# Patient Record
Sex: MALE | Race: WHITE | HISPANIC OR LATINO | Employment: OTHER | URBAN - METROPOLITAN AREA
[De-identification: names, ages, dates, MRNs, and addresses within clinical notes are randomized per-mention and may not be internally consistent; named-entity substitution may affect disease eponyms.]

---

## 2017-02-06 ENCOUNTER — HOSPITAL ENCOUNTER (OUTPATIENT)
Dept: RADIOLOGY | Facility: HOSPITAL | Age: 62
Discharge: HOME | End: 2017-02-06
Attending: PODIATRIST
Payer: COMMERCIAL

## 2017-02-06 ENCOUNTER — OFFICE VISIT (OUTPATIENT)
Dept: PODIATRY | Facility: CLINIC | Age: 62
End: 2017-02-06
Payer: COMMERCIAL

## 2017-02-06 VITALS — HEIGHT: 67 IN | BODY MASS INDEX: 26.68 KG/M2 | WEIGHT: 170 LBS

## 2017-02-06 DIAGNOSIS — M72.2 PLANTAR FASCIITIS, RIGHT: ICD-10-CM

## 2017-02-06 DIAGNOSIS — M72.2 PLANTAR FASCIITIS, RIGHT: Primary | ICD-10-CM

## 2017-02-06 DIAGNOSIS — M79.671 PAIN OF RIGHT HEEL: ICD-10-CM

## 2017-02-06 PROCEDURE — 73650 X-RAY EXAM OF HEEL: CPT | Mod: 26,,, | Performed by: RADIOLOGY

## 2017-02-06 PROCEDURE — 20550 NJX 1 TENDON SHEATH/LIGAMENT: CPT | Mod: S$GLB,,, | Performed by: PODIATRIST

## 2017-02-06 PROCEDURE — 99999 PR PBB SHADOW E&M-EST. PATIENT-LVL III: CPT | Mod: PBBFAC,,, | Performed by: PODIATRIST

## 2017-02-06 PROCEDURE — 73650 X-RAY EXAM OF HEEL: CPT | Mod: TC,PO,RT

## 2017-02-06 PROCEDURE — 99203 OFFICE O/P NEW LOW 30 MIN: CPT | Mod: 25,S$GLB,, | Performed by: PODIATRIST

## 2017-02-06 RX ORDER — LIDOCAINE HYDROCHLORIDE 20 MG/ML
1 INJECTION, SOLUTION EPIDURAL; INFILTRATION; INTRACAUDAL; PERINEURAL ONCE
Status: DISCONTINUED | OUTPATIENT
Start: 2017-02-06 | End: 2017-02-06

## 2017-02-06 RX ORDER — NAPROXEN 500 MG/1
500 TABLET ORAL 2 TIMES DAILY WITH MEALS
Qty: 30 TABLET | Refills: 0 | Status: SHIPPED | OUTPATIENT
Start: 2017-02-06 | End: 2017-11-16

## 2017-02-06 RX ORDER — METHYLPREDNISOLONE ACETATE 40 MG/ML
40 INJECTION, SUSPENSION INTRA-ARTICULAR; INTRALESIONAL; INTRAMUSCULAR; SOFT TISSUE ONCE
Status: COMPLETED | OUTPATIENT
Start: 2017-02-06 | End: 2017-02-06

## 2017-02-06 RX ADMIN — METHYLPREDNISOLONE ACETATE 40 MG: 40 INJECTION, SUSPENSION INTRA-ARTICULAR; INTRALESIONAL; INTRAMUSCULAR; SOFT TISSUE at 10:02

## 2017-02-06 NOTE — MR AVS SNAPSHOT
[facility]   [place]  [place] [address]  Phone: [phone]                  Alessandro Cerna   2017 9:30 AM   Office Visit    Description:  Male : 1955   Provider:  Pop Moe DPM   Department:  [facility]           Reason for Visit     Foot Pain           Diagnoses this Visit        Comments    Plantar fasciitis, right    -  Primary     Pain of right heel                To Do List           Future Appointments        Provider Department Dept Phone    2017 11:00 AM KENH XR1 300 LB LIMIT [facility] [phone]    2017 9:30 AM Pop Moe DPM [facility] [phone]      Goals (5 Years of Data)     None      Follow-Up and Disposition     Return in about 2 weeks (around 2017).       These Medications        Disp Refills Start End    naproxen (NAPROSYN) 500 MG tablet 30 tablet 0 2017     Take 1 tablet (500 mg total) by mouth 2 (two) times daily with meals. - Oral    Pharmacy: [facility]/pharmacy #[id number] - [place] - [address] #: [phone]         [facility]     [facility] Nurse Care Line -  Assistance  Registered nurses in the [facility] provide clinical advisement, health education, appointment booking, and other advisory services.  Call for this free service at [phone].             Medications           Message regarding Medications     Verify the changes and/or additions to your medication regime listed below are the same as discussed with your clinician today.  If any of these changes or additions are incorrect, please notify your healthcare provider.        START taking these NEW medications        Refills    naproxen (NAPROSYN) 500 MG tablet 0    Sig: Take 1 tablet (500 mg total) by mouth 2 (two) times daily with meals.    Class: Normal    Route: Oral      These medications were administered today        Dose Freq    methylPREDNISolone acetate  "injection 40 mg 40 mg Once    Sig: Inject 1 mL (40 mg total) into the muscle once.    Class: Normal    Route: Intramuscular      STOP taking these medications     SUPREP BOWEL PREP KIT 17.5-3.13-1.6 gram SolR     esomeprazole (NEXIUM) 40 MG capsule Take 40 mg by mouth before breakfast.      cetirizine (ZYRTEC) 10 MG tablet Take 10 mg by mouth daily as needed.      benzonatate (TESSALON) 200 MG capsule            Verify that the below list of medications is an accurate representation of the medications you are currently taking.  If none reported, the list may be blank. If incorrect, please contact your healthcare provider. Carry this list with you in case of emergency.           Current Medications     naproxen (NAPROSYN) 500 MG tablet Take 1 tablet (500 mg total) by mouth 2 (two) times daily with meals.           Clinical Reference Information           Your Vitals Were     Height Weight BMI          5' 7" (1.702 m) 77.1 kg (170 lb) 26.63 kg/m2        Allergies as of 2/6/2017     No Known Allergies      Immunizations Administered on Date of Encounter - 2/6/2017     None      Orders Placed During Today's Visit     Future Labs/Procedures Expected by Expires    X-Ray Calcaneus 2 View Right  2/6/2017 2/6/2018      Administrations This Visit     methylPREDNISolone acetate injection 40 mg     Admin Date Action Dose Route Administered By             02/06/2017 Given 40 mg Intramuscular Pop Moe DPM                      [email or web address] Sign-Up     Activating your [facility] account is as easy as 1-2-3!     1) Visit [email or web address], select Sign Up Now, enter this activation code and your date of birth, then select Next.  [email or web address]  Expires: 3/23/2017 10:50 AM      2) Create a username and password to use when you visit [facility] in the future and select a security question in case you lose your password and select Next.    3) Enter your e-mail address and click Sign Up!    Additional Information  If you have " questions, please e-mail [email or web address] or call [phone] to talk to our [facility] staff. Remember, [facility] is NOT to be used for urgent needs. For medical emergencies, dial 911.         Instructions    Recommended Sof Sol Fit Series Arch Supports with neutral arch found on amazon.com or [employer] at [employer].      Bent-Knee Calf Stretch  This exercise is designed to stretch and strengthen your feet and ankles. Before beginning the exercise, read through all the instructions. While exercising, breathe normally and dont bounce. If you feel any pain, stop the exercise. If pain persists, inform your healthcare provider:    · Stand an arms length away from a wall. Place the palms of your hands on the wall. Step forward about 12 inches with your ______ foot.  · Keeping toes pointed forward and both heels on the floor, bend both knees and lean forward. Hold for ____30__ seconds. Relax.  · Repeat ___3___ times. Do ___2-3___ sets a day.  Date Last Reviewed: 8/16/2015  © 3931-5560 [employer]. [address] [place] [address], [place] [address]. All rights reserved. This information is not intended as a substitute for professional medical care. Always follow your healthcare professional's instructions.             Language Assistance Services     ATTENTION: Language assistance services are available, free of charge. Please call [phone].      ATENCIÓN: Si habla español, tiene a jones disposición servicios gratuitos de asistencia lingüística. Llame al [phone].     CHÚ Ý: N?u b?n nói Ti?ng Vi?t, có các d?ch v? h? tr? ngôn ng? mi?n phí dành cho b?n. G?i s? [phone].         [facility] complies with applicable Federal civil rights laws and does not discriminate on the basis of race, color, national origin, age, disability, or sex.

## 2017-02-06 NOTE — PATIENT INSTRUCTIONS
Recommended Sof Sol Fit Series Arch Supports with neutral arch found on amazon.com or Call [employer] ([employer]) at [facility].      Bent-Knee Calf Stretch  This exercise is designed to stretch and strengthen your feet and ankles. Before beginning the exercise, read through all the instructions. While exercising, breathe normally and dont bounce. If you feel any pain, stop the exercise. If pain persists, inform your healthcare provider:    · Stand an arms length away from a wall. Place the palms of your hands on the wall. Step forward about 12 inches with your ______ foot.  · Keeping toes pointed forward and both heels on the floor, bend both knees and lean forward. Hold for ____30__ seconds. Relax.  · Repeat ___3___ times. Do ___2-3___ sets a day.  Date Last Reviewed: 8/16/2015  © 0252-8779 [employer]. [address] [place] [address], [place] [address]. All rights reserved. This information is not intended as a substitute for professional medical care. Always follow your healthcare professional's instructions.

## 2017-02-07 NOTE — PROGRESS NOTES
"Subjective:      Patient ID: Alessandro Cerna is a 61 y.o. male.    Chief Complaint: Foot Pain (Right Foot)    Alessandro is a 61 y.o. male who presents to the clinic complaining of heel pain in the right foot, especially with the first step in the morning. The pain is described as Aching. The onset of the pain was gradual in the past month and has slightly improved over the past several days. Alessandro rates the pain as 5/10. He denies a history of trauma. Pt. 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     Vitals:    02/06/17 0951   Weight: 77.1 kg (170 lb)   Height: 5' 7" (1.702 m)   PainSc:   8   PainLoc: Foot      Past Medical History   Diagnosis Date    GERD (gastroesophageal reflux disease)     TB (pulmonary tuberculosis) 1982     Treated [facility] 9 mo therapy.       No past surgical history on file.    Family History   Problem Relation Age of Onset    Emphysema Neg Hx        Social History     Social History    Marital status:      Spouse name: N/A    Number of children: N/A    Years of education: N/A     Social History Main Topics    Smoking status: Former Smoker     Packs/day: 1.50     Years: 2.00     Types: Cigarettes     Quit date: 4/3/1977    Smokeless tobacco: Never Used    Alcohol use 1.0 oz/week    Drug use: None    Sexual activity: Not Asked     Other Topics Concern    None     Social History Narrative       Current Outpatient Prescriptions   Medication Sig Dispense Refill    naproxen (NAPROSYN) 500 MG tablet Take 1 tablet (500 mg total) by mouth 2 (two) times daily with meals. 30 tablet 0     No current facility-administered medications for this visit.        Review of patient's allergies indicates:  No Known Allergies      Review of Systems   Constitution: Negative for chills, decreased appetite and fever.   Cardiovascular: Negative for chest pain, claudication and leg swelling.   Respiratory: Negative for cough.    Skin: Negative for dry skin " and nail changes.   Musculoskeletal: Positive for joint pain and myalgias. Negative for joint swelling.   Gastrointestinal: Negative for nausea and vomiting.   Neurological: Negative for numbness and paresthesias.           Objective:      Physical Exam   Constitutional: He appears well-developed.   Cardiovascular:   Dorsalis pedis and posterior tibial pulses are 2/4 Bilaterally. Toes are cool to touch. Feet are warm proximally   Pulmonary/Chest: No respiratory distress.   Musculoskeletal: He exhibits no edema or tenderness.   Adequate joint range of motion without pain, limitation, nor crepitation Bilateral feet and ankle joints.     Pain on palpation plantar medial right heel, no pain with ROM or MMT or medial and lateral compression of heel, - tinel's sign    + equinus that reduces with knee bent bilateral.      Feet:   Right Foot:   Skin Integrity: Positive for callus and dry skin. Negative for ulcer or skin breakdown.   Left Foot:   Skin Integrity: Positive for dry skin. Negative for ulcer.   Neurological: He is alert.   Brooklyn-Pierre 5.07 monofilamant testing is intact  Sachin feet. Sharp/dull sensation intact Bilaterally.   Skin: Skin is dry. No erythema.    Xerosis Bilaterally. No open lesions noted      Psychiatric: He has a normal mood and affect.             Assessment:       Encounter Diagnoses   Name Primary?    Plantar fasciitis, right Yes    Pain of right heel          Plan:       Alessandro was seen today for foot pain.    Diagnoses and all orders for this visit:    Plantar fasciitis, right  -     X-Ray Calcaneus 2 View Right; Future    Pain of right heel    Other orders  -     methylPREDNISolone acetate injection 40 mg; Inject 1 mL (40 mg total) into the muscle once.  -     naproxen (NAPROSYN) 500 MG tablet; Take 1 tablet (500 mg total) by mouth 2 (two) times daily with meals.      I counseled the patient on his conditions, their implications and medical management.        Discussed different  treatment options for heel pain. including conservative and interventional.  I gave written and verbal instructions on heel cord stretching and this was demonstrated for the patient. Patient expressed understanding. Discussed wearing appropriate shoe gear and avoiding flats, slippers, sandals, barefoot, and sockfeet. Recommended arch supports. My recommendation for OTC supports is Spenco polysorb replacement insoles or patient may elect more aggressive treatment with prescription arch supports.    After sterilizing the area with an alcohol prep, the affected area was injected with a  solution containing 1 cc of 2% Lidocaine  plain and 1 cc of  Depo Medrol .  The patient tolerated the injection well and reported comfort to the area     Stacie Levy DPM PGY-2    I have personally taken the history and examined this patient and agree with the resident's note as stated as above.   Pop Moe DPM, FACFAS

## 2017-02-20 ENCOUNTER — TELEPHONE (OUTPATIENT)
Dept: PODIATRY | Facility: CLINIC | Age: 62
End: 2017-02-20

## 2017-02-20 NOTE — TELEPHONE ENCOUNTER
----- Message from Myra Randolph sent at 2/20/2017  1:05 PM CST -----  Contact: [phone] / self   Patient is requesting to speak with you regarding his missed appointment this morning. Offered to reschedule the patient but patient declined stating he wanted to stop by this afternoon to get test results. I advised the patient that the doctor and staff will be in clinic seeing patients all afternoon and would not be able to come out and give results. Patient requests a phone call with results instead of rescheduling his appointment although I did offer to reschedule him several times. Please advise.

## 2017-02-20 NOTE — TELEPHONE ENCOUNTER
Spoke with the patient and he would like to be seen again as follow up when I get back. Please call to schedule.

## 2017-02-24 ENCOUNTER — OFFICE VISIT (OUTPATIENT)
Dept: PODIATRY | Facility: CLINIC | Age: 62
End: 2017-02-24
Payer: COMMERCIAL

## 2017-02-24 ENCOUNTER — TELEPHONE (OUTPATIENT)
Dept: ORTHOPEDICS | Facility: CLINIC | Age: 62
End: 2017-02-24

## 2017-02-24 VITALS
BODY MASS INDEX: 27.62 KG/M2 | WEIGHT: 176 LBS | HEART RATE: 69 BPM | DIASTOLIC BLOOD PRESSURE: 76 MMHG | HEIGHT: 67 IN | RESPIRATION RATE: 18 BRPM | SYSTOLIC BLOOD PRESSURE: 114 MMHG

## 2017-02-24 DIAGNOSIS — M54.50 LUMBAR SPINE PAIN: Primary | ICD-10-CM

## 2017-02-24 DIAGNOSIS — M79.671 PAIN OF RIGHT HEEL: ICD-10-CM

## 2017-02-24 DIAGNOSIS — M72.2 PLANTAR FASCIITIS: Primary | ICD-10-CM

## 2017-02-24 PROCEDURE — 1160F RVW MEDS BY RX/DR IN RCRD: CPT | Mod: S$GLB,,, | Performed by: PODIATRIST

## 2017-02-24 PROCEDURE — 99999 PR PBB SHADOW E&M-EST. PATIENT-LVL III: CPT | Mod: PBBFAC,,, | Performed by: PODIATRIST

## 2017-02-24 PROCEDURE — 99213 OFFICE O/P EST LOW 20 MIN: CPT | Mod: S$GLB,,, | Performed by: PODIATRIST

## 2017-02-24 RX ORDER — METHYLPREDNISOLONE 4 MG/1
4 TABLET ORAL DAILY
Qty: 1 TABLET | Refills: 0 | Status: SHIPPED | OUTPATIENT
Start: 2017-02-24 | End: 2017-11-16

## 2017-02-24 NOTE — TELEPHONE ENCOUNTER
Spoke to pt regarding appt Monday 2/27/17 at 9a with Chey Valle PA-C. Pt was informed to arrive on the 2nd floor at 830a, then up to floor 5 to see Chey. Pt verbalized understanding. Phone number was provided for any further questions.    Wendy DE LEON

## 2017-02-24 NOTE — PROGRESS NOTES
"Subjective:      Patient ID: Alessandro Cerna is a 61 y.o. male.    Chief Complaint: Follow-up (From Dr. Moe office ); Heel Pain (Rt foot ); and Plantar Fasciitis    Alessandro is a 61 y.o. male who presents to the clinic complaining of heel pain in the right foot, especially with the first step in the morning. The pain is described as Aching and Tight. The onset of the pain was sudden and has worsened over the past several days. He recived a steroid injection ny Dr Moe w/ minimal improvement. Seldomly took naproxen         Review of Systems   Constitution: Negative for chills, decreased appetite and fever.   Cardiovascular: Negative for leg swelling.   Musculoskeletal: Negative for arthritis, joint pain, joint swelling and myalgias.   Gastrointestinal: Negative for nausea and vomiting.   Neurological: Negative for loss of balance, numbness and paresthesias.           Objective:       Vitals:    02/24/17 1007   BP: 114/76   Pulse: 69   Resp: 18   Weight: 79.8 kg (176 lb)   Height: 5' 7" (1.702 m)   PainSc:   6   PainLoc: Foot        Physical Exam   Constitutional: He is oriented to person, place, and time. He appears well-developed and well-nourished.   Cardiovascular: Intact distal pulses.    dorsalis pedis and posterior tibial pulses are palpable bilaterally. Capillary refill time is within normal limits. + pedal hair growth          Musculoskeletal: Normal range of motion. He exhibits no edema or tenderness.   Adequate joint range of motion without pain, limitation, nor crepitation Bilateral feet and ankle joints. Muscle strength is 5/5 in all groups bilaterally.         Pain on palpation of right  plantar heel consistent with location of patient's chief complaint. Negative Tinel's sign. No pain with lateral compression of heels.     Neurological: He is alert and oriented to person, place, and time. He has normal strength. No sensory deficit.   Christmas-Pierre 5.07 monofilament is intact bilateral feet. "      Skin: Skin is warm, dry and intact. No lesion and no rash noted. No erythema.   Psychiatric: He has a normal mood and affect. His behavior is normal.   Vitals reviewed.            Assessment:       Encounter Diagnoses   Name Primary?    Plantar fasciitis Yes    Pain of right heel          Plan:       Alessandro was seen today for follow-up, heel pain and plantar fasciitis.    Diagnoses and all orders for this visit:    Plantar fasciitis  -     Ambulatory Referral to Internal Medicine    Pain of right heel    Other orders  -     methylPREDNISolone (MEDROL DOSEPACK) 4 mg tablet; Take 1 tablet (4 mg total) by mouth once daily. Use as instructed on dose pack      I counseled the patient on his conditions, their implications and medical management.    Patient instructed on adequate icing techniques. Patient should ice the affected area at least once per day x 10 minutes for 10 days . I advised the  patient that extra icing would also be beneficial to ensure adequate anti inflammatory effect     Stretching handout dispensed to patient. Instructions on adequate stretching reviewed in clinic     Discussed spenco inserts     Medrol dose mario prescribed, advised patient to take meds as directed. Patient should complete medication. If problems occur notify the clinic    Resume use of naprosyn as instructed    .

## 2017-02-27 ENCOUNTER — HOSPITAL ENCOUNTER (OUTPATIENT)
Dept: RADIOLOGY | Facility: HOSPITAL | Age: 62
Discharge: HOME | End: 2017-02-27
Attending: ORTHOPAEDIC SURGERY
Payer: COMMERCIAL

## 2017-02-27 ENCOUNTER — INITIAL CONSULT (OUTPATIENT)
Dept: ORTHOPEDICS | Facility: CLINIC | Age: 62
End: 2017-02-27
Payer: COMMERCIAL

## 2017-02-27 VITALS
BODY MASS INDEX: 27.3 KG/M2 | HEIGHT: 67 IN | SYSTOLIC BLOOD PRESSURE: 123 MMHG | DIASTOLIC BLOOD PRESSURE: 81 MMHG | WEIGHT: 173.94 LBS | HEART RATE: 68 BPM

## 2017-02-27 DIAGNOSIS — M25.551 RIGHT HIP PAIN: Primary | ICD-10-CM

## 2017-02-27 DIAGNOSIS — M54.50 LUMBAR SPINE PAIN: ICD-10-CM

## 2017-02-27 PROCEDURE — 1160F RVW MEDS BY RX/DR IN RCRD: CPT | Mod: S$GLB,,, | Performed by: PHYSICIAN ASSISTANT

## 2017-02-27 PROCEDURE — 72120 X-RAY BEND ONLY L-S SPINE: CPT | Mod: 26,,, | Performed by: RADIOLOGY

## 2017-02-27 PROCEDURE — 72100 X-RAY EXAM L-S SPINE 2/3 VWS: CPT | Mod: TC

## 2017-02-27 PROCEDURE — 72100 X-RAY EXAM L-S SPINE 2/3 VWS: CPT | Mod: 26,,, | Performed by: RADIOLOGY

## 2017-02-27 PROCEDURE — 99214 OFFICE O/P EST MOD 30 MIN: CPT | Mod: S$GLB,,, | Performed by: PHYSICIAN ASSISTANT

## 2017-02-27 PROCEDURE — 99999 PR PBB SHADOW E&M-EST. PATIENT-LVL III: CPT | Mod: PBBFAC,,, | Performed by: PHYSICIAN ASSISTANT

## 2017-03-09 ENCOUNTER — HOSPITAL ENCOUNTER (OUTPATIENT)
Dept: RADIOLOGY | Facility: HOSPITAL | Age: 62
Discharge: HOME | End: 2017-03-09
Attending: ORTHOPAEDIC SURGERY
Payer: COMMERCIAL

## 2017-03-09 DIAGNOSIS — M25.551 RIGHT HIP PAIN: ICD-10-CM

## 2017-03-09 PROCEDURE — 73721 MRI JNT OF LWR EXTRE W/O DYE: CPT | Mod: 26,RT,, | Performed by: RADIOLOGY

## 2017-03-09 PROCEDURE — 73721 MRI JNT OF LWR EXTRE W/O DYE: CPT | Mod: TC,RT

## 2017-03-23 ENCOUNTER — HOSPITAL ENCOUNTER (OUTPATIENT)
Dept: RADIOLOGY | Facility: HOSPITAL | Age: 62
Discharge: HOME | End: 2017-03-23
Attending: ORTHOPAEDIC SURGERY
Payer: COMMERCIAL

## 2017-03-23 ENCOUNTER — OFFICE VISIT (OUTPATIENT)
Dept: ORTHOPEDICS | Facility: CLINIC | Age: 62
End: 2017-03-23
Payer: COMMERCIAL

## 2017-03-23 DIAGNOSIS — M25.552 BILATERAL HIP PAIN: ICD-10-CM

## 2017-03-23 DIAGNOSIS — M25.551 BILATERAL HIP PAIN: ICD-10-CM

## 2017-03-23 DIAGNOSIS — M72.2 PLANTAR FASCIITIS, RIGHT: ICD-10-CM

## 2017-03-23 DIAGNOSIS — S76.311A RIGHT HAMSTRING MUSCLE STRAIN, INITIAL ENCOUNTER: Primary | ICD-10-CM

## 2017-03-23 DIAGNOSIS — M16.0 PRIMARY OSTEOARTHRITIS OF BOTH HIPS: ICD-10-CM

## 2017-03-23 PROCEDURE — 73521 X-RAY EXAM HIPS BI 2 VIEWS: CPT | Mod: TC

## 2017-03-23 PROCEDURE — 99999 PR PBB SHADOW E&M-EST. PATIENT-LVL II: CPT | Mod: PBBFAC,,, | Performed by: ORTHOPAEDIC SURGERY

## 2017-03-23 PROCEDURE — 99213 OFFICE O/P EST LOW 20 MIN: CPT | Mod: S$GLB,,, | Performed by: ORTHOPAEDIC SURGERY

## 2017-03-23 PROCEDURE — 73521 X-RAY EXAM HIPS BI 2 VIEWS: CPT | Mod: 26,,, | Performed by: RADIOLOGY

## 2017-03-23 PROCEDURE — 1160F RVW MEDS BY RX/DR IN RCRD: CPT | Mod: S$GLB,,, | Performed by: ORTHOPAEDIC SURGERY

## 2017-03-23 NOTE — PROGRESS NOTES
Right hamstring strain  Right plantar fasciitis    CC:   right hip pain  HPI:  61 y.o. yo male who presents with right hip pain.      Is a very pleasant 61-year-old male who for fun plays soccer.  He states that over the past few months he has not been able to play soccer due to his hip pain.  Seen Alberta Rain for this hip pain prior and she had done physical therapy.  He states the pain is mostly in his medial hamstrings.  It is not in his groin.  It is not on the lateral aspect of his hip.  The pain is a achy soreness pain that happens after he plays soccer.  He is about a 6 out of 10 at its worst.  It does not wake him sleep at night.  He still can walk unlimited distances and does not use an assistive device despite the pain.    He presents for secondary problem which is unrelated to the primary problem.  This is right foot pain.  It is in the area of the plantar fascia.  The pain is a pinpoint pain in the area of the plantar fascia.  It affects him every day.  He is tried a short insert but is not tried a full-length arch supporting insert.  The pain is about a 3 out of 10.  It is bothersome and worse after he stands up at work for a long period of time.    PAST MEDICAL HISTORY:   Past Medical History:   Diagnosis Date    GERD (gastroesophageal reflux disease)     TB (pulmonary tuberculosis) 1982    Treated [facility] 9 mo therapy.     PAST SURGICAL HISTORY: History reviewed. No pertinent surgical history.  FAMILY HISTORY:   Family History   Problem Relation Age of Onset    Emphysema Neg Hx      SOCIAL HISTORY:   Social History     Social History    Marital status:      Spouse name: N/A    Number of children: N/A    Years of education: N/A     Occupational History    Not on file.     Social History Main Topics    Smoking status: Former Smoker     Packs/day: 1.50     Years: 2.00     Types: Cigarettes     Quit date: 4/3/1977    Smokeless tobacco: Never Used    Alcohol use 1.0 oz/week    Drug use:  Not on file    Sexual activity: Not on file     Other Topics Concern    Not on file     Social History Narrative       MEDICATIONS:   Current Outpatient Prescriptions:     methylPREDNISolone (MEDROL DOSEPACK) 4 mg tablet, Take 1 tablet (4 mg total) by mouth once daily. Use as instructed on dose pack, Disp: 1 tablet, Rfl: 0    naproxen (NAPROSYN) 500 MG tablet, Take 1 tablet (500 mg total) by mouth 2 (two) times daily with meals., Disp: 30 tablet, Rfl: 0  ALLERGIES: Review of patient's allergies indicates:  No Known Allergies    VITAL SIGNS: There were no vitals taken for this visit.     Review of Systems   Constitution: Negative. Negative for chills, fever and night sweats.   HENT: Negative for congestion and headaches.    Eyes: Negative for blurred vision, left vision loss and right vision loss.   Cardiovascular: Negative for chest pain and syncope.   Respiratory: Negative for cough and shortness of breath.    Endocrine: Negative for polydipsia, polyphagia and polyuria.   Hematologic/Lymphatic: Negative for bleeding problem. Does not bruise/bleed easily.   Skin: Negative for dry skin, itching and rash.   Musculoskeletal: Negative for falls and muscle weakness.  right hip pain right foot pain  Gastrointestinal: Negative for abdominal pain and bowel incontinence.   Genitourinary: Negative for bladder incontinence and nocturia.   Neurological: Negative for disturbances in coordination, loss of balance and seizures.   Psychiatric/Behavioral: Negative for depression. The patient does not have insomnia.    Allergic/Immunologic: Negative for hives and persistent infections.       Physical Exam   Constitutional: Oriented to person, place, and time. Appears well-developed and well-nourished.   HENT:   Head: Normocephalic and atraumatic.   Nose: Nose normal.   Eyes: No scleral icterus.   Neck: Normal range of motion. Neck supple.   Cardiovascular: Normal rate and regular rhythm.    Pulses:       Radial pulses are 2+ on  the right side, and 2+ on the left side.   Pulmonary/Chest: Clear and normal  Abdominal: Soft.   Neurological: Alert and oriented to person, place, and time.   Skin: Skin is warm.   Psychiatric: Normal mood and affect.     He walks with an antalgic gait.  On examination of his right lower extremity he has no pain with internal and external rotation which are both full.  He is no pain with forced hip flexion.  He is some crossover type pain and he has some pain in his hamstrings when I palpate them.  On examination of his foot he is tender to palpation at the point of the plantar fascia.  He is no rest intact in the right lower extremity.       Xrays:  X-rays of the bilateral hips are ordered and reviewed and my interpretation is as follows: He has a mild osteoarthritis bilateral hips.    Assessment:  Encounter Diagnoses   Name Primary?    Right hamstring muscle strain, initial encounter Yes    Bilateral hip pain     Plantar fasciitis, right     Primary osteoarthritis of both hips        Plan:    Orders Placed This Encounter    X-Ray Hips Bilateral 2 View Inc AP Pelvis    Ambulatory Referral to Physical/Occupational Therapy       Return if symptoms worsen or fail to improve.      He is very mild osteoarthritis of bilateral hips but he also has a hamstring strain on the right side.  He will be sent to physical therapy for this.  He does have plantar fascia on the right side and I recommended physical therapy and a full-length arch supporting insert.  He will follow-up when necessary.

## 2017-03-23 NOTE — LETTER
March 23, 2017      Chey Valle PA-C  [address] [facility] [address]           [facility]  [address]  [place] [address]  Phone: [phone]          Patient: Alessandro Cerna   MR Number: [id number]   YOB: 1955   Date of Visit: 3/23/2017       Dear Chey Valle:    Thank you for referring Alessandro Cerna to me for evaluation. Attached you will find relevant portions of my assessment and plan of care.    If you have questions, please do not hesitate to call me. I look forward to following Alessandro Cerna along with you.    Sincerely,    Red Broderick MD    Enclosure  CC:  No Recipients    If you would like to receive this communication electronically, please contact [email or web address] or [phone] to request more information on [employer] [facility] access.    For providers and/or their staff who would like to refer a patient to [facility], please contact us through our one-stop-shop provider referral line, [facility], at [phone].    If you feel you have received this communication in error or would no longer like to receive these types of communications, please e-mail [email or web address]

## 2017-11-16 ENCOUNTER — LAB VISIT (OUTPATIENT)
Dept: LAB | Facility: HOSPITAL | Age: 62
End: 2017-11-16
Attending: FAMILY MEDICINE
Payer: COMMERCIAL

## 2017-11-16 ENCOUNTER — OFFICE VISIT (OUTPATIENT)
Dept: FAMILY MEDICINE | Facility: CLINIC | Age: 62
End: 2017-11-16
Payer: COMMERCIAL

## 2017-11-16 VITALS
BODY MASS INDEX: 28.93 KG/M2 | HEIGHT: 67 IN | WEIGHT: 184.31 LBS | SYSTOLIC BLOOD PRESSURE: 96 MMHG | OXYGEN SATURATION: 97 % | TEMPERATURE: 98 F | HEART RATE: 69 BPM | DIASTOLIC BLOOD PRESSURE: 60 MMHG

## 2017-11-16 DIAGNOSIS — N52.9 ERECTILE DYSFUNCTION, UNSPECIFIED ERECTILE DYSFUNCTION TYPE: ICD-10-CM

## 2017-11-16 DIAGNOSIS — K21.9 GASTROESOPHAGEAL REFLUX DISEASE, ESOPHAGITIS PRESENCE NOT SPECIFIED: ICD-10-CM

## 2017-11-16 DIAGNOSIS — Z00.00 VISIT FOR WELL MAN HEALTH CHECK: Primary | ICD-10-CM

## 2017-11-16 DIAGNOSIS — M16.0 PRIMARY OSTEOARTHRITIS OF BOTH HIPS: ICD-10-CM

## 2017-11-16 DIAGNOSIS — Z12.5 SCREENING PSA (PROSTATE SPECIFIC ANTIGEN): ICD-10-CM

## 2017-11-16 DIAGNOSIS — Z12.11 COLON CANCER SCREENING: ICD-10-CM

## 2017-11-16 DIAGNOSIS — Z23 NEED FOR DIPHTHERIA-TETANUS-PERTUSSIS (TDAP) VACCINE: ICD-10-CM

## 2017-11-16 DIAGNOSIS — Z11.59 NEED FOR HEPATITIS C SCREENING TEST: ICD-10-CM

## 2017-11-16 DIAGNOSIS — Z00.00 VISIT FOR WELL MAN HEALTH CHECK: ICD-10-CM

## 2017-11-16 DIAGNOSIS — M72.2 PLANTAR FASCIITIS: ICD-10-CM

## 2017-11-16 DIAGNOSIS — S56.912A FOREARM STRAIN, LEFT, INITIAL ENCOUNTER: ICD-10-CM

## 2017-11-16 DIAGNOSIS — J30.2 CHRONIC SEASONAL ALLERGIC RHINITIS, UNSPECIFIED TRIGGER: ICD-10-CM

## 2017-11-16 DIAGNOSIS — Z23 NEED FOR INFLUENZA VACCINATION: ICD-10-CM

## 2017-11-16 LAB
25(OH)D3+25(OH)D2 SERPL-MCNC: 21 NG/ML
ALBUMIN SERPL BCP-MCNC: 4 G/DL
ALP SERPL-CCNC: 108 U/L
ALT SERPL W/O P-5'-P-CCNC: 25 U/L
ANION GAP SERPL CALC-SCNC: 7 MMOL/L
AST SERPL-CCNC: 23 U/L
BILIRUB SERPL-MCNC: 0.9 MG/DL
BUN SERPL-MCNC: 13 MG/DL
CALCIUM SERPL-MCNC: 9 MG/DL
CHLORIDE SERPL-SCNC: 106 MMOL/L
CHOLEST SERPL-MCNC: 211 MG/DL
CHOLEST/HDLC SERPL: 5.9 {RATIO}
CO2 SERPL-SCNC: 28 MMOL/L
COMPLEXED PSA SERPL-MCNC: 1.5 NG/ML
CREAT SERPL-MCNC: 1.1 MG/DL
EST. GFR  (AFRICAN AMERICAN): >60 ML/MIN/1.73 M^2
EST. GFR  (NON AFRICAN AMERICAN): >60 ML/MIN/1.73 M^2
ESTIMATED AVG GLUCOSE: 97 MG/DL
GLUCOSE SERPL-MCNC: 96 MG/DL
HBA1C MFR BLD HPLC: 5 %
HDLC SERPL-MCNC: 36 MG/DL
HDLC SERPL: 17.1 %
LDLC SERPL CALC-MCNC: 138 MG/DL
NONHDLC SERPL-MCNC: 175 MG/DL
POTASSIUM SERPL-SCNC: 4 MMOL/L
PROT SERPL-MCNC: 8 G/DL
SODIUM SERPL-SCNC: 141 MMOL/L
TRIGL SERPL-MCNC: 185 MG/DL
TSH SERPL DL<=0.005 MIU/L-ACNC: 1.62 UIU/ML

## 2017-11-16 PROCEDURE — 84153 ASSAY OF PSA TOTAL: CPT

## 2017-11-16 PROCEDURE — 99999 PR PBB SHADOW E&M-EST. PATIENT-LVL III: CPT | Mod: PBBFAC,,, | Performed by: FAMILY MEDICINE

## 2017-11-16 PROCEDURE — 99386 PREV VISIT NEW AGE 40-64: CPT | Mod: S$GLB,,, | Performed by: FAMILY MEDICINE

## 2017-11-16 PROCEDURE — 83036 HEMOGLOBIN GLYCOSYLATED A1C: CPT

## 2017-11-16 PROCEDURE — 99202 OFFICE O/P NEW SF 15 MIN: CPT | Mod: 25,S$GLB,, | Performed by: FAMILY MEDICINE

## 2017-11-16 PROCEDURE — 80053 COMPREHEN METABOLIC PANEL: CPT

## 2017-11-16 PROCEDURE — 80061 LIPID PANEL: CPT

## 2017-11-16 PROCEDURE — 86803 HEPATITIS C AB TEST: CPT

## 2017-11-16 PROCEDURE — 84443 ASSAY THYROID STIM HORMONE: CPT

## 2017-11-16 PROCEDURE — 36415 COLL VENOUS BLD VENIPUNCTURE: CPT | Mod: PO

## 2017-11-16 PROCEDURE — 82306 VITAMIN D 25 HYDROXY: CPT

## 2017-11-16 RX ORDER — TIZANIDINE 4 MG/1
4 TABLET ORAL NIGHTLY
Qty: 14 TABLET | Refills: 0 | Status: SHIPPED | OUTPATIENT
Start: 2017-11-16 | End: 2017-11-30

## 2017-11-16 RX ORDER — FLUTICASONE PROPIONATE 50 MCG
1 SPRAY, SUSPENSION (ML) NASAL DAILY
Qty: 1 BOTTLE | Refills: 11 | Status: SHIPPED | OUTPATIENT
Start: 2017-11-16 | End: 2017-12-16

## 2017-11-16 RX ORDER — TADALAFIL 10 MG/1
TABLET ORAL
Qty: 20 TABLET | Refills: 0 | Status: SHIPPED | OUTPATIENT
Start: 2017-11-16 | End: 2019-03-25 | Stop reason: SDUPTHER

## 2017-11-16 NOTE — PATIENT INSTRUCTIONS
4 Steps for Eating Healthier  Changing the way you eat can improve your health. It can lower your cholesterol and blood pressure, and help you stay at a healthy weight. Your diet doesnt have to be bland and boring to be healthy. Just watch your calories and follow these steps:    1. Eat fewer unhealthy fats  · Choose more fish and lean meats instead of fatty cuts of meat.  · Skip butter and lard, and use less margarine.  · Pass on foods that have palm, coconut, or hydrogenated oils.  · Eat fewer high-fat dairy foods like cheese, ice cream, and whole milk.  · Get a heart-healthy cookbook and try some low-fat recipes.  2. Go light on salt  · Keep the saltshaker off the table.  · Limit high-salt ingredients, such as soy sauce, bouillon, and garlic salt.  · Instead of adding salt when cooking, season your food with herbs and flavorings. Try lemon, garlic, and onion.  · Limit convenience foods, such as boxed or canned foods and restaurant food.  · Read food labels and choose lower-sodium options.  3. Limit sugar  · Pause before you add sugars to pancakes, cereal, coffee, or tea. This includes white and brown table sugar, syrup, honey, and molasses. Cut your usual amount by half.  · Use non-sugar sweeteners. Stevia, aspartame, and sucralose can satisfy a sweet tooth without adding calories.  · Swap out sugar-filled soda and other drinks. Buy sugar-free or low-calorie beverages. Remember water is always the best choice.  · Read labels and choose foods with less added sugar. Keep in mind that dairy foods and foods with fruit will have some natural sugar.  · Cut the sugar in recipes by 1/3 to 1/2. Boost the flavor with extracts like almond, vanilla, or orange. Or add spices such as cinnamon or nutmeg.  4. Eat more fiber  · Eat fresh fruits and vegetables every day.  · Boost your diet with whole grains. Go for oats, whole-grain rice, and bran.  · Add beans and lentils to your meals.  · Drink more water to match your fiber  increase. This is to help prevent constipation.  Date Last Reviewed: 5/11/2015  © 1882-7709 [employer]. [address] [place] [address], [place] [address]. All rights reserved. This information is not intended as a substitute for professional medical care. Always follow your healthcare professional's instructions.

## 2017-11-16 NOTE — PROGRESS NOTES
Subjective:       Patient ID: Alessandro Cerna is a 62 y.o. male.    Chief Complaint: Annual Exam    62 year old male presents today to establish care.     Patient is from [place], eats a lot of latin food. Eats fruits and veggies, chicken, beef, pork. Drinks about 1 soda/day and 1-2 cups/coffee per day.     He doesn't exercise anymore.     Patient believes that his left arm is larger compared to his right hand. This has been going on for a year. It sometimes also feels tighter. He also had an injury at work with compressed air on his left arm also. He has not tried anything for this, either acutely or chronically. He is also complaining of some restriction of motion.     Patient desires treatment for ED. He was given treatment in [place] and it seemed to help. He was given Cialis, desires that again. Had no side effects. No issues with urination or stream. He does have early morning erections at times but needs help achieving erections.     Of note, he also has the significant additional past history of having been treated in 1982 at [facility] here in [place] for pulmonary tuberculosis.  He   describes having been on a 9-month regimen of medications.  It is his understanding that he completed his course of treatment as prescribed. He saw pulm in 2012 and had CXR and CT done with appeared to show late sequale of pulmonary TB infection but his spirometry was normal.     He snores at night, but states that when he wakes up in the morning he feels well rested.     Flu: declines  Tdap: declines  Labs: screening labs ordered today    Colonoscopy: 2 years ago, records pending    PMHx: reviewed in EMR and updated  Meds: reviewed in EMR and updated  Shx: reviewed in EMR and updated  FMHx: Mom had breast cancer, dad had prostate cancer  Social: patient has a cleaning company. He lives with his wife at home. Has a dog at home. No smokers at home.       Erectile Dysfunction   This is a new problem. The current  episode started more than 1 year ago. The nature of his difficulty is achieving erection. Obstructive symptoms do not include a weak stream. Pertinent negatives include no chills, dysuria or hematuria. Past treatments include tadalafil. The treatment provided moderate relief. He has had no adverse reactions caused by medications. There are no known risk factors.     Review of Systems   Constitutional: Negative for chills and fever.   HENT: Negative for sore throat and voice change.    Eyes: Negative for visual disturbance.   Respiratory: Negative for cough, shortness of breath, wheezing and stridor.    Cardiovascular: Negative for chest pain.   Gastrointestinal: Negative for abdominal pain, diarrhea, nausea and vomiting.   Genitourinary: Negative for difficulty urinating, dysuria and hematuria.   Musculoskeletal: Positive for joint swelling. Negative for neck pain and neck stiffness.   Skin: Negative for color change, pallor and rash.   Allergic/Immunologic: Negative for immunocompromised state.   Neurological: Negative for light-headedness and headaches.   Psychiatric/Behavioral: Negative for agitation, behavioral problems and confusion.         Health Maintenance Due   Topic Date Due    Hepatitis C Screening  1955    TETANUS VACCINE  06/01/1975    Colonoscopy  06/01/2007    Zoster Vaccine  06/01/2017       There is no immunization history on file for this patient.    Lab Review   No visits with results within 6 Month(s) from this visit.     Objective:     Vitals:    11/16/17 0848   BP: 96/60   Pulse: 69   Temp: 97.9 °F (36.6 °C)        Physical Exam   Constitutional: He is oriented to person, place, and time. He appears well-developed and well-nourished.   HENT:   Head: Normocephalic and atraumatic.   Right Ear: Tympanic membrane, external ear and ear canal normal.   Left Ear: Tympanic membrane, external ear and ear canal normal.   Mouth/Throat: Mucous membranes are normal. No tonsillar exudate.   Nasal  congestion  Mild cobblestoning noted   Eyes: Conjunctivae and EOM are normal. Pupils are equal, round, and reactive to light.   Neck: Normal range of motion. Neck supple.   Cardiovascular: Normal rate, regular rhythm and normal heart sounds.    Pulmonary/Chest: Effort normal and breath sounds normal.   Abdominal: Soft. Bowel sounds are normal. He exhibits no distension.   Musculoskeletal:   Forearm swelling noted (L)   Mild restriction noted on supination (L)    Lymphadenopathy:     He has no cervical adenopathy.     He has no axillary adenopathy.        Right: No supraclavicular adenopathy present.        Left: No supraclavicular adenopathy present.   Neurological: He is alert and oriented to person, place, and time. No cranial nerve deficit or sensory deficit. He exhibits normal muscle tone.   Skin: Skin is warm and dry.   Psychiatric: He has a normal mood and affect. His speech is normal and behavior is normal.       Assessment:       1. Visit for well man health check    2. Plantar fasciitis    3. Primary osteoarthritis of both hips    4. Need for hepatitis C screening test    5. Need for influenza vaccination    6. Need for diphtheria-tetanus-pertussis (Tdap) vaccine    7. Colon cancer screening    8. Gastroesophageal reflux disease, esophagitis presence not specified    9. Screening PSA (prostate specific antigen)    10. BMI 28.0-28.9,adult    11. Forearm strain, left, initial encounter    12. Chronic seasonal allergic rhinitis, unspecified trigger    13. Erectile dysfunction, unspecified erectile dysfunction type        Plan:         Visit for [facility] man health check  ?Avoid tobacco  ?Be physically active  ?Maintain a healthy weight  ?Eat a diet rich in fruits, vegetables, and whole grains, and low in saturated/trans fat  ?Limit alcohol consumption  ?Protect against sexually transmitted infections  ?Avoid excess sun  No drinks with added sugar  Counseled on dietary changes and weight loss counseling  -      Comprehensive metabolic panel; Future; Expected date: 11/16/2017  -     Hemoglobin A1c; Future; Expected date: 11/16/2017  -     Lipid panel; Future; Expected date: 11/16/2017  -     TSH; Future; Expected date: 11/16/2017  -     Vitamin D; Future; Expected date: 11/16/2017  -     PSA, Screening; Future; Expected date: 11/30/2017    Plantar fasciitis  Has seen podiatry, can see again if symptoms persist. Declines an apt today    Primary osteoarthritis of both hips  Noted in history     Need for hepatitis C screening test  -     Hepatitis C antibody; Future; Expected date: 11/16/2017    Need for influenza vaccination  Declines    Need for diphtheria-tetanus-pertussis (Tdap) vaccine  Declines    Colon cancer screening  States was last done at outside facility 2 years ago, records pending  -     Cancel: Case request GI: COLONOSCOPY    Gastroesophageal reflux disease, esophagitis presence not specified  Noted    Screening PSA (prostate specific antigen)  Discussed with patient that there is not a general consensus regarding prostate screening. While prostate cancer may be detected earlier, there is also the chance of false positive which would lead to urological evaluation and work up. After discussing the pros and cons, he would like to proceed with PSA screening  -     PSA, Screening; Future; Expected date: 11/30/2017    BMI 28.0-28.9,adult  No drinks with added sugar  Counseled on dietary changes and weight loss counseling    Forearm strain, left, initial encounter  Exercises performed in the office during visit, patient reports that he is feeling better. He declines formal PT. Will do these exercises at home, will call if symptoms persist.   -     tiZANidine (ZANAFLEX) 4 MG tablet; Take 1 tablet (4 mg total) by mouth every evening.  Dispense: 14 tablet; Refill: 0    Chronic seasonal allergic rhinitis, unspecified trigger  -     fluticasone (FLONASE) 50 mcg/actuation nasal spray; 1 spray by Each Nare route once daily.   Dispense: 1 Bottle; Refill: 11    Erectile dysfunction, unspecified erectile dysfunction type  -     tadalafil (CIALIS) 10 MG tablet; 10 mg at least 30 minutes prior to anticipated sexual activity as one single dose and not more than once daily.  Dispense: 20 tablet; Refill: 0    Follow up in 2 months for forearm and ED  Warning signs discussed, patient to call with any further issues or worsening of symptoms.

## 2017-11-17 ENCOUNTER — TELEPHONE (OUTPATIENT)
Dept: FAMILY MEDICINE | Facility: CLINIC | Age: 62
End: 2017-11-17

## 2017-11-17 DIAGNOSIS — E78.5 HYPERLIPIDEMIA, UNSPECIFIED HYPERLIPIDEMIA TYPE: Primary | ICD-10-CM

## 2017-11-17 LAB — HCV AB SERPL QL IA: NEGATIVE

## 2017-11-17 RX ORDER — ATORVASTATIN CALCIUM 10 MG/1
10 TABLET, FILM COATED ORAL DAILY
Qty: 90 TABLET | Refills: 3 | Status: SHIPPED | OUTPATIENT
Start: 2017-11-17 | End: 2018-03-05

## 2017-11-17 NOTE — TELEPHONE ENCOUNTER
Please call patient and let him know that his vitamin D was low and that his cholesterol was high. On the basis of his age and calculated risk for heart disease or stroke, the guidelines suggest he should be on a moderate to high intensity statin, this is a medication for cholesterol. I have ordered him atorvastatin 10 mg. He should also take a daily vitamin D that is OTC. His other labs were within acceptable limits.

## 2017-11-17 NOTE — TELEPHONE ENCOUNTER
Spoke with patient about lab result.  Patient requesting if he can reduce his cholesterol on his own (exercise   and eating healthy), because he do not want to take medication.  Please advise.

## 2017-11-20 NOTE — TELEPHONE ENCOUNTER
spoke with patient about Dr Cabrera recommendation.  Patient states he will be out of town for 3 months ([place]) and will check cholesterol in [place] in January 2018.  Patient will call back to follow up after he comes back from [place].

## 2017-12-18 ENCOUNTER — TELEPHONE (OUTPATIENT)
Dept: FAMILY MEDICINE | Facility: CLINIC | Age: 62
End: 2017-12-18

## 2017-12-18 NOTE — TELEPHONE ENCOUNTER
----- Message from Sara Amaral sent at 12/18/2017 12:11 PM CST -----  Contact: self/[phone]  He has low blood pressure and would like to be seen today.

## 2017-12-18 NOTE — TELEPHONE ENCOUNTER
Spoke with patient and he states b/p is low but could not tell me what readings he is getting. No c/o dizziness. Appt scheduled for tomorrow. Patient voices understanding.

## 2017-12-19 ENCOUNTER — OFFICE VISIT (OUTPATIENT)
Dept: FAMILY MEDICINE | Facility: CLINIC | Age: 62
End: 2017-12-19
Payer: COMMERCIAL

## 2017-12-19 VITALS
DIASTOLIC BLOOD PRESSURE: 70 MMHG | BODY MASS INDEX: 28.02 KG/M2 | SYSTOLIC BLOOD PRESSURE: 104 MMHG | WEIGHT: 178.56 LBS | HEART RATE: 98 BPM | OXYGEN SATURATION: 98 % | HEIGHT: 67 IN | TEMPERATURE: 98 F

## 2017-12-19 DIAGNOSIS — I95.9 HYPOTENSION, UNSPECIFIED HYPOTENSION TYPE: Primary | ICD-10-CM

## 2017-12-19 DIAGNOSIS — K21.9 GASTROESOPHAGEAL REFLUX DISEASE, ESOPHAGITIS PRESENCE NOT SPECIFIED: ICD-10-CM

## 2017-12-19 DIAGNOSIS — R07.89 SENSATION OF CHEST TIGHTNESS: ICD-10-CM

## 2017-12-19 PROCEDURE — 99214 OFFICE O/P EST MOD 30 MIN: CPT | Mod: S$GLB,,, | Performed by: FAMILY MEDICINE

## 2017-12-19 PROCEDURE — 99999 PR PBB SHADOW E&M-EST. PATIENT-LVL III: CPT | Mod: PBBFAC,,, | Performed by: FAMILY MEDICINE

## 2017-12-19 RX ORDER — PANTOPRAZOLE SODIUM 40 MG/1
40 TABLET, DELAYED RELEASE ORAL DAILY
Qty: 90 TABLET | Refills: 2 | Status: SHIPPED | OUTPATIENT
Start: 2017-12-19 | End: 2019-03-25 | Stop reason: SDUPTHER

## 2017-12-19 NOTE — PROGRESS NOTES
"Subjective:       Patient ID: Alessandro Cerna is a 62 y.o. male.    Chief Complaint: Hypotension (Tightness in chest)    Alessandro is a 62 y.o. male who presents today with what he thinks was low blood pressure. He states that he felt cold a few days ago and checked his BP and it was systolic of 100's. He is not taking Cialis at this time.     He is also complaining of "chest tightness." This started on Sunday. This has resolved. He was at rest when he felt pain. He is unaware of any worsening symptoms or relieving symptoms for this pain. He now reports that he has had this before. He states that this has been going on for about 1 month. It doesn't change with activity. He is not currently feeling these symptoms. He has a remote smoking history of about 3 pack years total. He has a history of reflux. Upon further questioning, He stopped taking nexium 4 months ago. In addition, the chest tightness localizes to the epigastric region. He did not have this prior to stopping his nexium.     He has 4 siblings, no heart issues for any of them. No heart issues for his parents.     Currently denies any light headeded, dizziness, chest pain, SOB.       Gastroesophageal Reflux   He complains of belching and heartburn. He reports no abdominal pain, no chest pain, no choking, no dysphagia, no early satiety or no nausea. This is a chronic problem. The current episode started more than 1 year ago. The problem has been gradually worsening. The heartburn is located in the substernum. The heartburn is of mild intensity. The heartburn doesn't change with position. The symptoms are aggravated by certain foods. Pertinent negatives include no anemia, melena or weight loss. There are no known risk factors.     Review of Systems   Constitutional: Negative for weight loss.   Respiratory: Negative for choking.    Cardiovascular: Negative for chest pain.   Gastrointestinal: Positive for heartburn. Negative for abdominal pain, dysphagia, " melena and nausea.       As above      Objective:     Vitals:    12/19/17 1438   BP: 104/70   Pulse: 98   Temp: 97.8 °F (36.6 °C)        Physical Exam   Constitutional: He is oriented to person, place, and time. He appears well-developed and well-nourished.   HENT:   Head: Normocephalic and atraumatic.   Neck: Neck supple.   Cardiovascular: Normal rate and regular rhythm.    Abdominal: Soft.   Palpating epigastric region causes patient to belch but otherwise no TTP.   Abd SNTND   Musculoskeletal: He exhibits no edema.   Neurological: He is alert and oriented to person, place, and time.   Skin: Skin is warm.   Psychiatric: He has a normal mood and affect. His behavior is normal. Judgment and thought content normal.   Nursing note and vitals reviewed.      Assessment:       1. Hypotension, unspecified hypotension type    2. Gastroesophageal reflux disease, esophagitis presence not specified    3. Sensation of chest tightness        Plan:       Hypotension, unspecified hypotension type  Unclear is patient actually had hypotension, reports feeling cold. No light headedness. No dizziness. No other symptoms of hypotension. Pressures appear normal today. Counseled on signs and symptoms of hypertension and hypotension along with natural course of blood pressure and heart rate.     Gastroesophageal reflux disease, esophagitis presence not specified  GERD precautions reviewed and educational material given  -     pantoprazole (PROTONIX) 40 MG tablet; Take 1 tablet (40 mg total) by mouth once daily.  Dispense: 90 tablet; Refill: 2    Sensation of chest tightness  Appears to be related to reflux. Start protonix. If symptoms persist, will send to cardiology.   Follow up in 2 weeks    30 minutes spent with patient, of which >50% was spent on counseling and coordination of care.     Warning signs discussed, patient to call with any further issues or worsening of symptoms.

## 2017-12-19 NOTE — PATIENT INSTRUCTIONS
Tips to Control Acid Reflux    To control acid reflux, youll need to make some basic diet and lifestyle changes. The simple steps outlined below may be all youll need to ease discomfort.  Watch what you eat  · Avoid fatty foods and spicy foods.  · Eat fewer acidic foods, such as citrus and tomato-based foods. These can increase symptoms.  · Limit drinking alcohol, caffeine, and fizzy beverages. All increase acid reflux.  · Try limiting chocolate, peppermint, and spearmint. These can worsen acid reflux in some people.  Watch when you eat  · Avoid lying down for 3 hours after eating.  · Do not snack before going to bed.  Raise your head  Raising your head and upper body by 4 to 6 inches helps limit reflux when youre lying down. Put blocks under the head of your bed frame to raise it.  Other changes  · Lose weight, if you need to  · Dont exercise near bedtime  · Avoid tight-fitting clothes  · Limit aspirin and ibuprofen  · Stop smoking   Date Last Reviewed: 7/1/2016 © 2000-2017 [employer]. [address] [place] [address]. All rights reserved. This information is not intended as a substitute for professional medical care. Always follow your healthcare professional's instructions.    Understanding Fat and Cholesterol  Too much cholesterol in your blood can lead to many problems such as blocked arteries. This can lead to heart attack and stroke. One of the best ways to manage heart and blood vessel disease is to lower your blood cholesterol. Planning meals that are low in saturated fat and cholesterol helps reduce the level of cholesterol in your blood. Below are eating tips to help lower your blood cholesterol levels.  Eat less fat  A healthy goal is to have less than 25% to 35% of your daily calories come from fat. Instead of fats, eat more fruits, whole-grains, and vegetables. This also helps control your weight, and can even reduce your risk for some cancers. There are different kinds  of fats in foods. Fats can be saturated, unsaturated, or trans fats. The best fats to choose are unsaturated fats. But fats are high in calories, so eat even unsaturated fats sparingly.  Limit foods high in saturated fats  Saturated fats come from animals and certain plants (such as coconut and palm). Eating too much saturated fat can raise your blood cholesterol levels and make your artery problems worse. Your goal is to eat less saturated fat. Below are some examples of foods that contain lots of saturated fat:  · Fatty cuts of meat (lamb, ham, beef)  · Many pastries, cakes, cookies, and candies  · Cream, ice cream, sour cream, cheese, and butter, and foods made with them  · Sauces made with butter or cream  · Salad dressings with saturated fats  · Foods that contain palm or coconut oil  Choose unsaturated fats  Unsaturated fats are usually liquid at room temperature. They are better choices for your heart than saturated fat. There are two types of unsaturated fats: polyunsaturated fat and monounsaturated fat. Aim to replace saturated fats with polyunsaturated or monounsaturated fats.  · Polyunsaturated fats are found in corn oil, safflower oil, sunflower oil, and other vegetable oils.  · Monounsaturated fats are found in olive oil, canola oil, and peanut oil. Some margarines and spreads are now made with these oils, too. Avocados are also high in monounsaturated fat.  Of all fats, monounsaturated fats are the least harmful to your heart.  Avoid trans fats  Like saturated fats, trans fats have been linked to heart disease. Even a small amount can harm your health. Trans fats are found in liquid oils that have been changed to be solid at room temperature. Margarine, which is often made from vegetable oil, is one example. Vegetable shortening is another. Trans fats are often found in packaged goods. Check ingredients for the words hydrogenated or partially hydrogenated. They mean the foods contain trans  fat.  What about triglycerides?  Triglycerides are a type of fat in your blood. Like cholesterol, high levels of triglycerides can lead to blocked arteries. High triglyceride levels can be reduced 20% to 50%  by limiting added sugars in your diet, susbstituting healthier fats for saturated and trans fats, getting more physical activity, and losing weight if your are overweight. You may also be advised to avoid or limi alcohol.    Reading food labels  Luckily, most foods now have labels giving you the facts about what youre eating. Reading food labels helps you make healthy choices. Look for the words highlighted below.  · Serving Size. This is the amount of food in 1 serving. If you eat larger portions, be sure to count more of everything: fat, calories, and cholesterol.  · Total Fat. Tells you how many grams (g) of fat are in 1 serving.  · Calories from Fat. This tells you the total number of calories from fat in 1 serving (there are 9 calories per gram of fat). Look for foods with the fewest calories from fat.  · Saturated Fat. Tells you how many grams (g) of saturated fat are in 1 serving.  · Trans Fat. Tells how many grams (g) of trans fat are in 1 serving.  · Cholesterol. Tells you how many milligrams (mg) of cholesterol are in 1 serving.  Date Last Reviewed: 5/11/2015  © 4675-7296 [employer]. [address] [place] [address], [place] [address]. All rights reserved. This information is not intended as a substitute for professional medical care. Always follow your healthcare professional's instructions.            Lifestyle Changes to Control Cholesterol  You can control your cholesterol through diet, exercise, weight management, quitting smoking, stress management, and taking your medicines right. These things can also lower your risk for cardiovascular disease.    Eating healthy  Your healthcare provider will give you information on diet changes you may need to make. Your provider may recommend that you  see a registered dietitian for help with diet changes. Changes may include:  · Cutting back on the amount of fat and cholesterol in your meals  · Eating less salt (sodium). This is especially important if you have high blood pressure.  · Eating more fresh vegetables and fruits  · Eating lean proteins such as fish, poultry, beans, and peas  · Eating less red meat and processed meats  · Using low-fat dairy products  · Using vegetable and nut oils in limited amounts  · Limiting how many sweets and processed foods like chips, cookies, and baked goods that you eat   · Limiting how many sugar-sweetened beverages you drink  · Limiting how often you eat out  Getting exercise  Regular exercise is a good way to help your body control cholesterol. Regular exercise can help in many ways. It can:  · Raise your good cholesterol  · Help lower your bad cholesterol  · Let blood flow better through your body  · Give more oxygen to your muscles and tissues  · Help you manage your weight  · Help your heart pump better  · Lower your blood pressure  Your healthcare provider may recommend that you get more physical activity if you haven't been active. Your provider may recommend that you get moderate to vigorous physical activity for at least 40 minutes each day. You should do this for at least 3 to 4 days each week. A few examples of moderate to vigorous activity are:  · Walking at a brisk pace. This is about 3 to 4 miles per hour.  · Jogging or running  · Swimming or water aerobics  · Hiking  · Dancing  · Martial arts  · Tennis  · Riding a bicycle or stationary bike  · Dancing  Managing your weight  If you are overweight or obese, your healthcare provider will work with you to help you lose weight and lower your BMI (body mass index). Making diet changes and getting more physical activity can help. Changing your diet will help you lose weight more easily than adding exercise.  Quitting smoking  Smoking and other tobacco use can raise  cholesterol and make it harder to control. Quitting is tough. But millions of people have given up tobacco for good. You can quit, too! Think about some of the reasons below to quit smoking. Do any of them make you think twice about your smoking habit?  Stop smoking because it:  · Keeps your cholesterol high, even if you make all the other changes youre supposed to  · Damages your body. It especially harms your heart, lungs, skin, and blood vessels.  · Makes you more likely to have a heart attack (acute myocardial infarction), stroke, or cancer  · Stains your teeth  · Makes your skin, clothes, and breath smell bad  · Costs a lot of money  Controlling stress   Learn ways to control stress. This will help you deal with stress in your home and work life. Controlling stress can greatly lower your risk of getting cardiovascular disease.  Making the most of medicines  Healthy eating and exercise are a good start to keeping your cholesterol down. But you may need some extra help from medicine. If your doctor prescribes medicine, be sure to take it exactly as directed. Remember:  · Tell your healthcare provider about all other medicines you take. This includes vitamins and herbs.  · Tell your healthcare provider if you have any side effects after starting to take a medicine. Examples of side effects to watch for include muscle aches, weakness, blurred vision, rust-colored urine, yellowing of eyes or skin (jaundice), and headache.  · Dont skip a dose or stop taking your medicine because you feel better or because your cholesterol numbers go down. Never stop taking your medicine unless your healthcare provider has told you its OK.  · Ask your healthcare provider if you have any questions about your medicines.  High risk groups  Some people may need to take medicines called statins to control their cholesterol. This is in addition to eating a healthy diet and getting regular exercise.  Statins can help you stay healthy.  They can also help prevent a heart attack or stroke. You may need to take a statin if you are in one of these groups:  · Adults who have had a heart attack or stroke. Or adults who have had peripheral vascular disease, a ministroke (transient ischemic attack), or stable or unstable angina. This group also includes people who have had a procedure to restore blood flow through a blocked artery. These procedures include percutaneous coronary intervention, angioplasty, stent, and open-heart bypass surgery.  · Adults who have diabetes. Or adults who are at higher risk of having a heart attack or stroke and have an LDL cholesterol level of 70 to 189 mg/dL  · Adults who are 21 years old or older and have an LDL cholesterol level of 190 mg/dL or higher.  If you are in a high-risk group, talk with your healthcare provider about your treatment goals. Make sure you understand why these goals are important, based on your own health history and your family history of heart disease or high cholesterol.  Make a plan to have regular cholesterol checks. You may need to fast before getting this test. Also ask your provider about any side effects your medicines may cause. Let your provider know about any side effects you have. You may need to take more than one medicine to reach the cholesterol goals that you and your provider decide on.  Date Last Reviewed: 10/1/2016  © 7256-0174 [employer]. [address] [place] [address], [place] [address]. All rights reserved. This information is not intended as a substitute for professional medical care. Always follow your healthcare professional's instructions.

## 2018-01-23 ENCOUNTER — OFFICE VISIT (OUTPATIENT)
Dept: INTERNAL MEDICINE | Facility: CLINIC | Age: 63
End: 2018-01-23
Payer: COMMERCIAL

## 2018-01-23 VITALS
BODY MASS INDEX: 27.12 KG/M2 | HEART RATE: 69 BPM | OXYGEN SATURATION: 97 % | TEMPERATURE: 98 F | SYSTOLIC BLOOD PRESSURE: 108 MMHG | DIASTOLIC BLOOD PRESSURE: 62 MMHG | HEIGHT: 67 IN | WEIGHT: 172.81 LBS

## 2018-01-23 DIAGNOSIS — J06.9 UPPER RESPIRATORY TRACT INFECTION, UNSPECIFIED TYPE: Primary | ICD-10-CM

## 2018-01-23 PROCEDURE — 99213 OFFICE O/P EST LOW 20 MIN: CPT | Mod: S$GLB,,, | Performed by: INTERNAL MEDICINE

## 2018-01-23 PROCEDURE — 99999 PR PBB SHADOW E&M-EST. PATIENT-LVL III: CPT | Mod: PBBFAC,,, | Performed by: INTERNAL MEDICINE

## 2018-01-23 RX ORDER — HYDROCODONE BITARTRATE AND HOMATROPINE METHYLBROMIDE ORAL SOLUTION 5; 1.5 MG/5ML; MG/5ML
5 LIQUID ORAL EVERY 4 HOURS PRN
Qty: 175 ML | Refills: 0 | Status: SHIPPED | OUTPATIENT
Start: 2018-01-23 | End: 2018-02-02

## 2018-01-23 RX ORDER — METHYLPREDNISOLONE 4 MG/1
TABLET ORAL
Qty: 1 PACKAGE | Refills: 0 | Status: SHIPPED | OUTPATIENT
Start: 2018-01-23 | End: 2018-03-02

## 2018-01-23 NOTE — PROGRESS NOTES
Subjective:       Patient ID: Alessandro Cerna is a 62 y.o. male.    Chief Complaint: Sore Throat and Cough    HPI  Pt with 3 days of coryza, nasal congestion, min prod cough w/o F/C, SOB  Review of Systems    Objective:      Physical Exam   Constitutional: He appears well-developed. No distress.   HENT:   Head: Normocephalic.   Oropharynx inflamed w/o exudate   Eyes: EOM are normal. No scleral icterus.   Neck: Normal range of motion. No tracheal deviation present.   Cardiovascular: Normal rate, regular rhythm and intact distal pulses.    Pulmonary/Chest: Effort normal and breath sounds normal. No respiratory distress.   Abdominal: He exhibits no distension.   Musculoskeletal: Normal range of motion. He exhibits no edema.   Neurological: He is alert.   Skin: Skin is warm and dry. No rash noted. He is not diaphoretic. No erythema.   Psychiatric: He has a normal mood and affect. His behavior is normal.   Vitals reviewed.      Assessment:       1. Upper respiratory tract infection, unspecified type        Plan:       Alessandro was seen today for sore throat and cough.    Diagnoses and all orders for this visit:    Upper respiratory tract infection, unspecified type  -     methylPREDNISolone (MEDROL DOSEPACK) 4 mg tablet; use as directed  -     hydrocodone-homatropine 5-1.5 mg/5 ml (HYCODAN) 5-1.5 mg/5 mL Syrp; Take 5 mLs by mouth every 4 (four) hours as needed (cough).      Follow-up if symptoms worsen or fail to improve.

## 2018-02-19 ENCOUNTER — PATIENT MESSAGE (OUTPATIENT)
Dept: ADMINISTRATIVE | Facility: HOSPITAL | Age: 63
End: 2018-02-19

## 2018-03-02 ENCOUNTER — OFFICE VISIT (OUTPATIENT)
Dept: FAMILY MEDICINE | Facility: CLINIC | Age: 63
End: 2018-03-02
Payer: COMMERCIAL

## 2018-03-02 VITALS
HEART RATE: 70 BPM | DIASTOLIC BLOOD PRESSURE: 70 MMHG | WEIGHT: 170.88 LBS | BODY MASS INDEX: 26.82 KG/M2 | SYSTOLIC BLOOD PRESSURE: 100 MMHG | HEIGHT: 67 IN | TEMPERATURE: 98 F | OXYGEN SATURATION: 98 %

## 2018-03-02 DIAGNOSIS — Z11.3 ROUTINE SCREENING FOR STI (SEXUALLY TRANSMITTED INFECTION): ICD-10-CM

## 2018-03-02 DIAGNOSIS — R06.09 EXERTIONAL DYSPNEA: ICD-10-CM

## 2018-03-02 DIAGNOSIS — G44.209 ACUTE NON INTRACTABLE TENSION-TYPE HEADACHE: Primary | ICD-10-CM

## 2018-03-02 DIAGNOSIS — E78.5 HYPERLIPIDEMIA, UNSPECIFIED HYPERLIPIDEMIA TYPE: ICD-10-CM

## 2018-03-02 PROCEDURE — 87491 CHLMYD TRACH DNA AMP PROBE: CPT

## 2018-03-02 PROCEDURE — 99999 PR PBB SHADOW E&M-EST. PATIENT-LVL IV: CPT | Mod: PBBFAC,,, | Performed by: FAMILY MEDICINE

## 2018-03-02 PROCEDURE — 99214 OFFICE O/P EST MOD 30 MIN: CPT | Mod: S$GLB,,, | Performed by: FAMILY MEDICINE

## 2018-03-02 RX ORDER — TIZANIDINE 2 MG/1
2 TABLET ORAL NIGHTLY PRN
Qty: 30 TABLET | Refills: 0 | Status: SHIPPED | OUTPATIENT
Start: 2018-03-02 | End: 2018-03-12

## 2018-03-02 RX ORDER — FLUTICASONE PROPIONATE 50 MCG
1 SPRAY, SUSPENSION (ML) NASAL 2 TIMES DAILY
Qty: 1 BOTTLE | Refills: 11 | Status: SHIPPED | OUTPATIENT
Start: 2018-03-02 | End: 2018-04-01

## 2018-03-02 NOTE — PATIENT INSTRUCTIONS
Managing Tension-type Headache Symptoms  A tension-type headache can develop slowly. Being aware of the symptoms helps you recognize a headache early. Then you can act to reduce pain and relieve tension. Methods for relieving your symptoms include self-care and medicine.    Tension-type symptoms  The earlier you recognize the symptoms of a tension-type headache, the easier it is to treat. Tension-type headaches may:  · Begin with fatigue, tension, or pain in the neck and shoulders  · Feel like a band around the head  · Be concentrated in the temple, the back of the head, behind the eyes, or in the face  · Come and go, or last for days, weeks, or even longer  · Involve referred pain -- this means that the area that hurts may not be where the problem begins  Self-care during a tension-type headache  When you have a tension-type headache, there are things you can do to relax, loosen muscles, and reduce the pain:  · Brush your scalp lightly with a soft hairbrush.  · Give yourself a massage. Knead the muscles running from your shoulders up the back of your skull. Or ask a friend to gently massage your neck and shoulders.  · Use an ice pack. Wrap a thin cloth around a cold pack, a cold can of soda, or a bag of frozen vegetables. Apply this directly to the place where you feel pain (such as your neck or temples).  · Use moist heat to relax your muscles. Take a warm shower or bath. Or drape a warm, moist towel around your neck and shoulders.  Relieving pain and tension  Over-the-counter or prescription medicine can help relieve pain. Another way to reduce your pain is to use relaxation techniques to loosen tight muscles.  Medicine  Medicine used for tension-type headaches include the following:  · NSAIDs (nonsteroidal anti-inflammatories), such as aspirin and ibuprofen, relieve inflammation and help block pain signals.  · Acetaminophen treats pain, and some formulations contain caffeine.   · Muscle relaxants can reduce  painful muscle contractions.  Taking medicine safely  Be aware that:  · Taking analgesics (pain relievers) or drinking too much coffee may lead to rebound headaches (frequent or severe headaches that can happen if you miss a dose of medication), so take pain medications only as needed. If you think you have these headaches, contact your health care provider.  · Taking too much medication can cause sleep problems or stomach upset. Some over-the-counter headache medications may contain caffeine. These may disrupt sleep and worsen pain.  Relaxation techniques  A , class, book, or tape may help you learn these techniques. One or more of these methods may work for you:  · Deep breathing. Slow, calm, deep breathing can help you relax. Breathe in for a count of 5 or more. Then slowly let the breath out.  · Visualization. Imagining a peaceful, secure scene can give you a sense of control over your body and surroundings.  · Progressive relaxation. This is done by tightening and then releasing muscle groups. Start at the top of your head and work your way down your body. Tighten each muscle group for 5 to 10 seconds. Then release the muscle group for the same amount of time.  · Biofeedback. This is a type of training in which you learn to control certain physical functions and responses. This helps you learn to reduce muscle tension.     Date Last Reviewed: 11/9/2015 © 2000-2017 [employer]. [address] [place] [address], [place] [address]. All rights reserved. This information is not intended as a substitute for professional medical care. Always follow your healthcare professional's instructions.

## 2018-03-02 NOTE — PROGRESS NOTES
"Subjective:       Patient ID: Alessandro Cerna is a 62 y.o. male.    Chief Complaint: Follow-up (Headache)    Alessandro is a 62 y.o. male who presents today with headaches. This started a few days ago. No trauma. Today he did not have any headaches. He has them intermittently throughout the day. He has been advil and this helps the headaches. Never really had headaches before. He last saw an eye doctor two days ago. He is not sure what makes his headaches better. Advil makes them better.     He is also complaining of "chest tightness" again. He is unaware of any relieving symptoms for this pain although he does state that climbing stairs makes this worse. He has now had this for months. In the past, he was diagnosed with reflux and given nexium and when he stopped taking this medication, his symptoms started. At the last visit, he was then started on a PPI, he reports non compliance with his PPI also. He is not currently feeling these symptoms. He has a remote smoking history of about 3 pack years total.      He has 4 siblings, no heart issues for any of them. No heart issues for his parents.       Headache    This is a new problem. The current episode started in the past 7 days. The problem occurs daily. The problem has been waxing and waning. The pain is located in the bilateral, frontal and occipital region. Radiates to: neck. Quality: pressure. Associated symptoms include nausea and neck pain. Pertinent negatives include no back pain, blurred vision, ear pain, eye pain, eye redness, eye watering, fever, numbness, phonophobia, photophobia, scalp tenderness, tingling or vomiting. He has tried NSAIDs for the symptoms. The treatment provided mild relief. There is no history of hypertension, migraine headaches, obesity or recent head traumas.     Review of Systems   Constitutional: Negative for fever.   HENT: Negative for ear pain.    Eyes: Negative for blurred vision, photophobia, pain and redness. "   Gastrointestinal: Positive for nausea. Negative for vomiting.   Musculoskeletal: Positive for neck pain. Negative for back pain.   Neurological: Positive for headaches. Negative for tingling and numbness.         Objective:     Vitals:    03/02/18 1614   BP: 100/70   Pulse: 70   Temp: 98.2 °F (36.8 °C)        Physical Exam   Constitutional: He is oriented to person, place, and time. He appears well-developed and well-nourished.   HENT:   Head: Normocephalic and atraumatic.   Nasal congestion noted  Visible Postnasal drip and mild pharyngeal erythema noted   Eyes: Conjunctivae are normal.   Neck: Normal range of motion. Neck supple.   Cardiovascular: Normal rate and regular rhythm.    Pulmonary/Chest: Effort normal and breath sounds normal.   Abdominal: Soft. He exhibits no distension.   Musculoskeletal: He exhibits no edema.        Back:    Neurological: He is alert and oriented to person, place, and time. No cranial nerve deficit or sensory deficit. He exhibits normal muscle tone.   Finger to nose intact  Heel to shin intact  Strength and sensation grossly intract BL UE/LE  CN 2-12 grossly intact  PERRLA  Rapid alternating movement intact   Psychiatric: He has a normal mood and affect. His behavior is normal. Judgment and thought content normal.   Nursing note and vitals reviewed.      Assessment:       1. Acute non intractable tension-type headache    2. Exertional dyspnea    3. Hyperlipidemia, unspecified hyperlipidemia type    4. Routine screening for STI (sexually transmitted infection)        Plan:         Acute non intractable tension-type headache  Explained to patient that this was likely a tension type headache.  He was counseled on relaxation techniques and to start exercise such as yoga or syed chi.  Handout was given.  A short course of a muscle relaxer at nighttime was also ordered.  If his symptoms worsen or progress he is to call for an appointment.  -     tiZANidine (ZANAFLEX) 2 MG tablet; Take 1  tablet (2 mg total) by mouth nightly as needed.  Dispense: 30 tablet; Refill: 0  -     fluticasone (FLONASE) 50 mcg/actuation nasal spray; 1 spray (50 mcg total) by Each Nare route 2 (two) times daily.  Dispense: 1 Bottle; Refill: 11    Exertional dyspnea  Patient has complained of this in the past also.  At the previous visit he states that it was associated with food, now he states that it happens at times while walking up stairs.  I believe that patient has an underlying anxiety disorder.  He changes his chief complaint throughout the exam from headaches to itchiness on his back to exertional dyspnea to intermittent urine stream changes.  I attempted to explain to the patient that his symptoms were most likely due to reflux and anxiety, however he desired a cardiology referral.  He declines to use his Protonix as directed.  He declines to take his atorvastatin.  -     Exercise stress echo with color flow; Future  -     Ambulatory referral to Cardiology    Hyperlipidemia, unspecified hyperlipidemia type  Atorvastatin was ordered.  He has not started it.  He has lost weight due to dietary changes and wants his lipid panel rechecked.  This was ordered.  -     Lipid panel; Future; Expected date: 03/02/2018    Routine screening for STI (sexually transmitted infection)  -     HIV-1 and HIV-2 antibodies; Future; Expected date: 03/02/2018  -     C. trachomatis/N. gonorrhoeae by AMP DNA Urine  -     RPR; Future; Expected date: 03/02/2018  Warning signs discussed, patient to call with any further issues or worsening of symptoms.     Parts of the above note were dictated using a voice dictation software. Please excuse any grammatical or typographical errors.

## 2018-03-03 ENCOUNTER — LAB VISIT (OUTPATIENT)
Dept: LAB | Facility: HOSPITAL | Age: 63
End: 2018-03-03
Attending: FAMILY MEDICINE
Payer: COMMERCIAL

## 2018-03-03 DIAGNOSIS — Z11.3 ROUTINE SCREENING FOR STI (SEXUALLY TRANSMITTED INFECTION): ICD-10-CM

## 2018-03-03 DIAGNOSIS — E78.5 HYPERLIPIDEMIA, UNSPECIFIED HYPERLIPIDEMIA TYPE: ICD-10-CM

## 2018-03-03 LAB
CHOLEST SERPL-MCNC: 123 MG/DL
CHOLEST/HDLC SERPL: 3.5 {RATIO}
HDLC SERPL-MCNC: 35 MG/DL
HDLC SERPL: 28.5 %
LDLC SERPL CALC-MCNC: 68.4 MG/DL
NONHDLC SERPL-MCNC: 88 MG/DL
TRIGL SERPL-MCNC: 98 MG/DL

## 2018-03-03 PROCEDURE — 86703 HIV-1/HIV-2 1 RESULT ANTBDY: CPT

## 2018-03-03 PROCEDURE — 36415 COLL VENOUS BLD VENIPUNCTURE: CPT | Mod: PO

## 2018-03-03 PROCEDURE — 86592 SYPHILIS TEST NON-TREP QUAL: CPT

## 2018-03-03 PROCEDURE — 80061 LIPID PANEL: CPT

## 2018-03-05 ENCOUNTER — TELEPHONE (OUTPATIENT)
Dept: FAMILY MEDICINE | Facility: CLINIC | Age: 63
End: 2018-03-05

## 2018-03-05 LAB
HIV 1+2 AB+HIV1 P24 AG SERPL QL IA: NEGATIVE
RPR SER QL: NORMAL

## 2018-03-05 NOTE — TELEPHONE ENCOUNTER
----- Message from Jamaal Cabrera DO sent at 3/5/2018  8:09 AM CST -----  Please call the patient regarding his normal result. Please let him know that his diet and exercise is working very well! His cholesterol has decreased from 211 to 123 and his triglycerides from 185 to 98! He should continue what he is doing. He no longer needs a cholesterol medication.

## 2018-03-06 LAB
C TRACH DNA SPEC QL NAA+PROBE: NOT DETECTED
N GONORRHOEA DNA SPEC QL NAA+PROBE: NOT DETECTED

## 2018-03-08 ENCOUNTER — PATIENT MESSAGE (OUTPATIENT)
Dept: FAMILY MEDICINE | Facility: CLINIC | Age: 63
End: 2018-03-08

## 2018-03-09 ENCOUNTER — HOSPITAL ENCOUNTER (OUTPATIENT)
Dept: CARDIOLOGY | Facility: CLINIC | Age: 63
Discharge: HOME | End: 2018-03-09
Attending: FAMILY MEDICINE
Payer: COMMERCIAL

## 2018-03-09 DIAGNOSIS — R06.09 EXERTIONAL DYSPNEA: ICD-10-CM

## 2018-03-09 LAB
DIASTOLIC DYSFUNCTION: NO
ESTIMATED PA SYSTOLIC PRESSURE: 19.18
RETIRED EF AND QEF - SEE NOTES: 65 (ref 55–65)

## 2018-03-09 PROCEDURE — 93320 DOPPLER ECHO COMPLETE: CPT | Mod: S$GLB,,, | Performed by: INTERNAL MEDICINE

## 2018-03-09 PROCEDURE — 93351 STRESS TTE COMPLETE: CPT | Mod: S$GLB,,, | Performed by: INTERNAL MEDICINE

## 2018-03-09 PROCEDURE — 93325 DOPPLER ECHO COLOR FLOW MAPG: CPT | Mod: S$GLB,,, | Performed by: INTERNAL MEDICINE

## 2018-03-20 ENCOUNTER — TELEPHONE (OUTPATIENT)
Dept: FAMILY MEDICINE | Facility: CLINIC | Age: 63
End: 2018-03-20

## 2018-03-20 NOTE — TELEPHONE ENCOUNTER
----- Message from Myra Blueson sent at 3/19/2018  4:58 PM CDT -----  Contact: [phone] / self   I called the patient to schedule his cardiology referral and the patient was confused why he is being sent to cardiology. Please advise.

## 2018-03-25 ENCOUNTER — HOSPITAL ENCOUNTER (EMERGENCY)
Facility: HOSPITAL | Age: 63
Discharge: HOME | End: 2018-03-25
Attending: EMERGENCY MEDICINE
Payer: COMMERCIAL

## 2018-03-25 VITALS
DIASTOLIC BLOOD PRESSURE: 82 MMHG | WEIGHT: 175 LBS | OXYGEN SATURATION: 98 % | SYSTOLIC BLOOD PRESSURE: 144 MMHG | HEART RATE: 69 BPM | RESPIRATION RATE: 20 BRPM | TEMPERATURE: 99 F | BODY MASS INDEX: 27.47 KG/M2 | HEIGHT: 67 IN

## 2018-03-25 DIAGNOSIS — R04.0 EPISTAXIS: Primary | ICD-10-CM

## 2018-03-25 PROCEDURE — 99283 EMERGENCY DEPT VISIT LOW MDM: CPT

## 2018-03-25 NOTE — ED NOTES
APPEARANCE: Alert, oriented and in no acute distress.  CARDIAC: Normal rate and rhythm, no murmur heard.   PERIPHERAL VASCULAR: peripheral pulses present. Normal cap refill. No edema. Warm to touch.    RESPIRATORY:Normal rate and effort, breath sounds clear bilaterally throughout chest. Respirations are equal and unlabored no obvious signs of distress.  GASTRO: soft, no tenderness, no abdominal distention.  MUSC: Full ROM. No bony tenderness or soft tissue tenderness. No obvious deformity.  SKIN: Skin is warm and dry, normal skin turgor, mucous membranes moist.  NEURO: 5/5 strength major flexors/extensors bilaterally. Sensory intact to light touch bilaterally. Chanell coma scale: eyes open spontaneously-4, oriented & converses-5, obeys commands-6. No neurological abnormalities.   MENTAL STATUS: awake, alert and aware of environment.  EYE: PERRL, both eyes: pupils brisk and reactive to light. Normal size.  ENT: EARS: no obvious drainage. NOSE: no active bleeding.   RESP: BBS cta, resp =/unlabored

## 2018-03-25 NOTE — ED NOTES
Pt reports when wiping nose earlier today, noticed small amount of blood on top of hand.  Denies nosebleeding at this time, denies headache or cough.  Has prescription for flonase for sinus/allergies but did not get it filled.

## 2018-03-25 NOTE — ED PROVIDER NOTES
Encounter Date: 3/25/2018    SCRIBE #1 NOTE: I, Judy hCeng, am scribing for, and in the presence of,  Dr. Culp. I have scribed the entire note.       History     Chief Complaint   Patient presents with    Epistaxis     resolved at current time      11:10 AM    This is a 62 y.o. male who has a past medical history of GERD (gastroesophageal reflux disease) and TB (pulmonary tuberculosis) (1982).   The patient presents to the Emergency Department with epistaxis.   The onset began this morning after he woke up.  Symptoms are associated with nasal congestion and sneezing  Pt denies rhinorrhea, post nasal drip, fever, chills, or diaphoresis. .   Symptoms are aggravated by sneezing  Symptoms are relieved by by compressing his nose.   No pre-hospital treatments were attempted.  Patient states the bleeding has seen resolved at this time.   He confirms that he currently recovering from a sinus infection  Patient denies of using blood thinners.  Patient has had prior history of similar symptoms.   Pt has no past surgical history on file.      The history is provided by the patient.     Review of patient's allergies indicates:  No Known Allergies  Past Medical History:   Diagnosis Date    GERD (gastroesophageal reflux disease)     TB (pulmonary tuberculosis) 1982    Treated [facility] 9 mo therapy.     No past surgical history on file.  Family History   Problem Relation Age of Onset    Breast cancer Mother     Prostate cancer Father     Emphysema Neg Hx      Social History   Substance Use Topics    Smoking status: Former Smoker     Packs/day: 1.50     Years: 2.00     Types: Cigarettes     Quit date: 4/3/1977    Smokeless tobacco: Never Used    Alcohol use 1.0 oz/week      Comment: one drink/week     Review of Systems   Constitutional: Negative for activity change, chills, diaphoresis and fever.   HENT: Positive for congestion, nosebleeds and sneezing. Negative for postnasal drip and rhinorrhea.    Respiratory: Negative  for shortness of breath.    Skin: Negative for rash and wound.   Neurological: Negative for dizziness and headaches.   Hematological: Negative for adenopathy. Does not bruise/bleed easily.   Psychiatric/Behavioral: Negative for confusion.       Physical Exam     Initial Vitals [03/25/18 1041]   BP Pulse Resp Temp SpO2   (!) 144/82 69 20 98.5 °F (36.9 °C) 98 %      MAP       102.67         Physical Exam    Nursing note and vitals reviewed.  Constitutional: He appears well-developed and well-nourished. He is not diaphoretic. No distress.   HENT:   Head: Normocephalic and atraumatic.   Mouth/Throat: Oropharynx is clear and moist.   Small abrasion on the right ala. Dried blood noted. Left naris is clear.    Eyes: Conjunctivae are normal.   Cardiovascular: Normal rate.   Pulmonary/Chest: No respiratory distress.   Neurological: He is alert and oriented to person, place, and time.   Skin: Skin is warm and dry. No erythema.         ED Course   Procedures  Labs Reviewed - No data to display          Medical Decision Making:   Initial Assessment:   This is an emergent evaluation of a 62 y.o.male patient with presentation of mild episode of epistaxis  Initial differentials include but are not limited to: epistaxis.   Plan: Consulted patient on at home treatments and techniques. Patient will use Saline Mist, aquaphor. Return precautions discussed.              Attending Attestation:           Physician Attestation for Scribe:      Comments: I, Dr. Ambrose Culp, personally performed the services described in this documentation.   All medical record entries made by the scribe were at my direction and in my presence.   I have reviewed the chart and agree that the record is accurate and complete.   Ambrose Culp MD.                  Clinical Impression:     1. Epistaxis      Disposition:   Disposition: Discharged  Condition: Stable  Clinical impression and plan discussed with patient.    Pt is to call for follow up with PCP in 7  days.  Pt to return to the ED for any new or concerning symptoms.  Aftercare instructions and return precautions provided to patient.   Pt expressed understanding and agrees with plan.                    Ambrose Culp MD  03/25/18 0450

## 2019-03-25 ENCOUNTER — TELEPHONE (OUTPATIENT)
Dept: FAMILY MEDICINE | Facility: CLINIC | Age: 64
End: 2019-03-25

## 2019-03-25 DIAGNOSIS — K21.9 GASTROESOPHAGEAL REFLUX DISEASE, ESOPHAGITIS PRESENCE NOT SPECIFIED: ICD-10-CM

## 2019-03-25 DIAGNOSIS — N52.9 ERECTILE DYSFUNCTION, UNSPECIFIED ERECTILE DYSFUNCTION TYPE: ICD-10-CM

## 2019-03-25 DIAGNOSIS — Z00.00 VISIT FOR WELL MAN HEALTH CHECK: Primary | ICD-10-CM

## 2019-03-25 DIAGNOSIS — Z12.5 SCREENING PSA (PROSTATE SPECIFIC ANTIGEN): ICD-10-CM

## 2019-03-25 RX ORDER — PANTOPRAZOLE SODIUM 40 MG/1
40 TABLET, DELAYED RELEASE ORAL DAILY
Qty: 90 TABLET | Refills: 0 | Status: SHIPPED | OUTPATIENT
Start: 2019-03-25 | End: 2019-08-08

## 2019-03-25 RX ORDER — TADALAFIL 10 MG/1
TABLET ORAL
Qty: 20 TABLET | Refills: 0 | Status: SHIPPED | OUTPATIENT
Start: 2019-03-25 | End: 2019-12-07

## 2019-03-25 NOTE — TELEPHONE ENCOUNTER
I returned patient call to scheduled labs. I was unable to reach patient left message with patient wife to return my phone call at the office. Please advise.

## 2019-03-25 NOTE — TELEPHONE ENCOUNTER
----- Message from Bethany Harris MA sent at 3/25/2019 11:38 AM CDT -----  Contact: Self [phone]   Can you place orders for patient labs?  ----- Message -----  From: Sandra Parks  Sent: 3/25/2019  11:14 AM  To: Deborah Hinton Staff    Patient would like full blood work orders put in the system and for PSA. Please advise.

## 2019-03-25 NOTE — TELEPHONE ENCOUNTER
----- Message from Sandra Parks sent at 3/25/2019 11:14 AM CDT -----  Contact: Self [phone]  Patient would like full blood work orders put in the system and for PSA. Please advise.

## 2019-04-05 ENCOUNTER — LAB VISIT (OUTPATIENT)
Dept: LAB | Facility: HOSPITAL | Age: 64
End: 2019-04-05
Attending: FAMILY MEDICINE
Payer: COMMERCIAL

## 2019-04-05 DIAGNOSIS — Z12.5 SCREENING PSA (PROSTATE SPECIFIC ANTIGEN): ICD-10-CM

## 2019-04-05 DIAGNOSIS — Z00.00 VISIT FOR WELL MAN HEALTH CHECK: ICD-10-CM

## 2019-04-05 LAB
25(OH)D3+25(OH)D2 SERPL-MCNC: 22 NG/ML (ref 30–96)
ALBUMIN SERPL BCP-MCNC: 4 G/DL (ref 3.5–5.2)
ALP SERPL-CCNC: 90 U/L (ref 55–135)
ALT SERPL W/O P-5'-P-CCNC: 16 U/L (ref 10–44)
ANION GAP SERPL CALC-SCNC: 8 MMOL/L (ref 8–16)
AST SERPL-CCNC: 23 U/L (ref 10–40)
BASOPHILS # BLD AUTO: 0.03 K/UL (ref 0–0.2)
BASOPHILS NFR BLD: 0.7 % (ref 0–1.9)
BILIRUB SERPL-MCNC: 1.1 MG/DL (ref 0.1–1)
BUN SERPL-MCNC: 16 MG/DL (ref 8–23)
CALCIUM SERPL-MCNC: 9.1 MG/DL (ref 8.7–10.5)
CHLORIDE SERPL-SCNC: 105 MMOL/L (ref 95–110)
CHOLEST SERPL-MCNC: 170 MG/DL (ref 120–199)
CHOLEST/HDLC SERPL: 4.9 {RATIO} (ref 2–5)
CO2 SERPL-SCNC: 27 MMOL/L (ref 23–29)
COMPLEXED PSA SERPL-MCNC: 1.2 NG/ML (ref 0–4)
CREAT SERPL-MCNC: 0.9 MG/DL (ref 0.5–1.4)
DIFFERENTIAL METHOD: NORMAL
EOSINOPHIL # BLD AUTO: 0.1 K/UL (ref 0–0.5)
EOSINOPHIL NFR BLD: 1.7 % (ref 0–8)
ERYTHROCYTE [DISTWIDTH] IN BLOOD BY AUTOMATED COUNT: 13.2 % (ref 11.5–14.5)
EST. GFR  (AFRICAN AMERICAN): >60 ML/MIN/1.73 M^2
EST. GFR  (NON AFRICAN AMERICAN): >60 ML/MIN/1.73 M^2
ESTIMATED AVG GLUCOSE: 100 MG/DL (ref 68–131)
GLUCOSE SERPL-MCNC: 97 MG/DL (ref 70–110)
HBA1C MFR BLD HPLC: 5.1 % (ref 4–5.6)
HCT VFR BLD AUTO: 43.7 % (ref 40–54)
HDLC SERPL-MCNC: 35 MG/DL (ref 40–75)
HDLC SERPL: 20.6 % (ref 20–50)
HGB BLD-MCNC: 14.2 G/DL (ref 14–18)
IMM GRANULOCYTES # BLD AUTO: 0 K/UL (ref 0–0.04)
IMM GRANULOCYTES NFR BLD AUTO: 0 % (ref 0–0.5)
LDLC SERPL CALC-MCNC: 116.8 MG/DL (ref 63–159)
LYMPHOCYTES # BLD AUTO: 1.9 K/UL (ref 1–4.8)
LYMPHOCYTES NFR BLD: 47.1 % (ref 18–48)
MCH RBC QN AUTO: 30.8 PG (ref 27–31)
MCHC RBC AUTO-ENTMCNC: 32.5 G/DL (ref 32–36)
MCV RBC AUTO: 95 FL (ref 82–98)
MONOCYTES # BLD AUTO: 0.3 K/UL (ref 0.3–1)
MONOCYTES NFR BLD: 7.8 % (ref 4–15)
NEUTROPHILS # BLD AUTO: 1.8 K/UL (ref 1.8–7.7)
NEUTROPHILS NFR BLD: 42.7 % (ref 38–73)
NONHDLC SERPL-MCNC: 135 MG/DL
NRBC BLD-RTO: 0 /100 WBC
PLATELET # BLD AUTO: 256 K/UL (ref 150–350)
PMV BLD AUTO: 9.8 FL (ref 9.2–12.9)
POTASSIUM SERPL-SCNC: 4.4 MMOL/L (ref 3.5–5.1)
PROT SERPL-MCNC: 7.4 G/DL (ref 6–8.4)
RBC # BLD AUTO: 4.61 M/UL (ref 4.6–6.2)
SODIUM SERPL-SCNC: 140 MMOL/L (ref 136–145)
TRIGL SERPL-MCNC: 91 MG/DL (ref 30–150)
TSH SERPL DL<=0.005 MIU/L-ACNC: 1.7 UIU/ML (ref 0.4–4)
WBC # BLD AUTO: 4.1 K/UL (ref 3.9–12.7)

## 2019-04-05 PROCEDURE — 80061 LIPID PANEL: CPT

## 2019-04-05 PROCEDURE — 36415 COLL VENOUS BLD VENIPUNCTURE: CPT | Mod: PO

## 2019-04-05 PROCEDURE — 80053 COMPREHEN METABOLIC PANEL: CPT

## 2019-04-05 PROCEDURE — 84153 ASSAY OF PSA TOTAL: CPT

## 2019-04-05 PROCEDURE — 82306 VITAMIN D 25 HYDROXY: CPT

## 2019-04-05 PROCEDURE — 85025 COMPLETE CBC W/AUTO DIFF WBC: CPT

## 2019-04-05 PROCEDURE — 84443 ASSAY THYROID STIM HORMONE: CPT

## 2019-04-05 PROCEDURE — 83036 HEMOGLOBIN GLYCOSYLATED A1C: CPT

## 2019-05-01 ENCOUNTER — OFFICE VISIT (OUTPATIENT)
Dept: FAMILY MEDICINE | Facility: CLINIC | Age: 64
End: 2019-05-01
Payer: COMMERCIAL

## 2019-05-01 VITALS
OXYGEN SATURATION: 98 % | TEMPERATURE: 98 F | SYSTOLIC BLOOD PRESSURE: 98 MMHG | HEART RATE: 68 BPM | DIASTOLIC BLOOD PRESSURE: 60 MMHG | BODY MASS INDEX: 27.86 KG/M2 | WEIGHT: 177.5 LBS | HEIGHT: 67 IN

## 2019-05-01 DIAGNOSIS — J32.9 SINUSITIS, BACTERIAL: Primary | ICD-10-CM

## 2019-05-01 DIAGNOSIS — R05.9 COUGH: ICD-10-CM

## 2019-05-01 DIAGNOSIS — B96.89 SINUSITIS, BACTERIAL: Primary | ICD-10-CM

## 2019-05-01 PROCEDURE — 3008F BODY MASS INDEX DOCD: CPT | Mod: CPTII,S$GLB,, | Performed by: NURSE PRACTITIONER

## 2019-05-01 PROCEDURE — 99999 PR PBB SHADOW E&M-EST. PATIENT-LVL III: CPT | Mod: PBBFAC,,, | Performed by: NURSE PRACTITIONER

## 2019-05-01 PROCEDURE — 99999 PR PBB SHADOW E&M-EST. PATIENT-LVL III: ICD-10-PCS | Mod: PBBFAC,,, | Performed by: NURSE PRACTITIONER

## 2019-05-01 PROCEDURE — 99213 OFFICE O/P EST LOW 20 MIN: CPT | Mod: 25,S$GLB,, | Performed by: NURSE PRACTITIONER

## 2019-05-01 PROCEDURE — 3008F PR BODY MASS INDEX (BMI) DOCUMENTED: ICD-10-PCS | Mod: CPTII,S$GLB,, | Performed by: NURSE PRACTITIONER

## 2019-05-01 PROCEDURE — 99213 PR OFFICE/OUTPT VISIT, EST, LEVL III, 20-29 MIN: ICD-10-PCS | Mod: 25,S$GLB,, | Performed by: NURSE PRACTITIONER

## 2019-05-01 PROCEDURE — 96372 PR INJECTION,THERAP/PROPH/DIAG2ST, IM OR SUBCUT: ICD-10-PCS | Mod: S$GLB,,, | Performed by: NURSE PRACTITIONER

## 2019-05-01 PROCEDURE — 96372 THER/PROPH/DIAG INJ SC/IM: CPT | Mod: S$GLB,,, | Performed by: NURSE PRACTITIONER

## 2019-05-01 RX ORDER — AMOXICILLIN AND CLAVULANATE POTASSIUM 875; 125 MG/1; MG/1
1 TABLET, FILM COATED ORAL EVERY 12 HOURS
Qty: 20 TABLET | Refills: 0 | Status: SHIPPED | OUTPATIENT
Start: 2019-05-01 | End: 2019-05-11

## 2019-05-01 RX ORDER — BENZONATATE 200 MG/1
200 CAPSULE ORAL 2 TIMES DAILY PRN
Qty: 60 CAPSULE | Refills: 0 | Status: SHIPPED | OUTPATIENT
Start: 2019-05-01 | End: 2019-05-31

## 2019-05-01 RX ORDER — TRIAMCINOLONE ACETONIDE 40 MG/ML
40 INJECTION, SUSPENSION INTRA-ARTICULAR; INTRAMUSCULAR
Status: COMPLETED | OUTPATIENT
Start: 2019-05-01 | End: 2019-05-01

## 2019-05-01 RX ORDER — GUAIFENESIN 600 MG/1
1200 TABLET, EXTENDED RELEASE ORAL 2 TIMES DAILY
Qty: 40 TABLET | Refills: 0 | COMMUNITY
Start: 2019-05-01 | End: 2019-05-11

## 2019-05-01 RX ADMIN — TRIAMCINOLONE ACETONIDE 40 MG: 40 INJECTION, SUSPENSION INTRA-ARTICULAR; INTRAMUSCULAR at 06:05

## 2019-05-01 NOTE — PROGRESS NOTES
Subjective:       Patient ID: Alessandro Cerna is a 63 y.o. male.    Chief Complaint: Sore Throat; Otalgia; Cough; and Sinusitis    Sore Throat    This is a new problem. The current episode started in the past 7 days. The problem has been gradually worsening. There has been no fever. The pain is at a severity of 3/10. The pain is mild. Associated symptoms include congestion, coughing, headaches and a hoarse voice. Pertinent negatives include no abdominal pain, diarrhea, neck pain, shortness of breath, trouble swallowing or vomiting. Associated symptoms comments: Ear congestion . Treatments tried: theraflu.   Cough   This is a new problem. The current episode started in the past 7 days. The problem has been gradually worsening. The problem occurs every few minutes. The cough is non-productive. Associated symptoms include headaches, myalgias, nasal congestion, postnasal drip, rhinorrhea and a sore throat. Pertinent negatives include no chest pain, chills, eye redness, fever, rash, shortness of breath or wheezing. Nothing aggravates the symptoms. Treatments tried: theraflu. The treatment provided no relief. There is no history of environmental allergies.   Sinusitis   This is a new problem. The current episode started in the past 7 days. The problem has been gradually worsening since onset. There has been no fever. His pain is at a severity of 3/10. The pain is moderate. Associated symptoms include congestion, coughing, headaches, a hoarse voice, sinus pressure and a sore throat. Pertinent negatives include no chills, neck pain or shortness of breath. Treatments tried: theraflu. The treatment provided no relief.     Review of Systems   Constitutional: Negative for activity change, appetite change, chills, fatigue, fever and unexpected weight change.   HENT: Positive for congestion, hoarse voice, postnasal drip, rhinorrhea, sinus pressure and sore throat. Negative for facial swelling and trouble swallowing.          "Ear congestion    Eyes: Positive for visual disturbance. Negative for pain and redness.        Wears glasses   Respiratory: Positive for cough and chest tightness. Negative for shortness of breath and wheezing.    Cardiovascular: Negative.  Negative for chest pain, palpitations and leg swelling.   Gastrointestinal: Negative for abdominal pain, diarrhea, nausea and vomiting.   Genitourinary: Negative for dysuria, flank pain and urgency.   Musculoskeletal: Positive for myalgias. Negative for gait problem, neck pain and neck stiffness.   Skin: Negative for rash.   Allergic/Immunologic: Negative for environmental allergies, food allergies and immunocompromised state.   Neurological: Positive for headaches. Negative for dizziness, weakness and light-headedness.   Psychiatric/Behavioral: Negative for agitation and confusion. The patient is not nervous/anxious.          Objective:     BP 98/60 (BP Location: Right arm, Patient Position: Sitting, BP Method: Medium (Manual))   Pulse 68   Temp 98 °F (36.7 °C) (Oral)   Ht 5' 7" (1.702 m)   Wt 80.5 kg (177 lb 7.5 oz)   SpO2 98%   BMI 27.80 kg/m²   Past Medical History:   Diagnosis Date    GERD (gastroesophageal reflux disease)     TB (pulmonary tuberculosis) 1982    Treated [facility] 9 mo therapy.     Lab Results   Component Value Date    CREATININE 0.9 04/05/2019    BUN 16 04/05/2019     04/05/2019    K 4.4 04/05/2019     04/05/2019    CO2 27 04/05/2019     Lab Results   Component Value Date    ALT 16 04/05/2019    AST 23 04/05/2019    ALKPHOS 90 04/05/2019    BILITOT 1.1 (H) 04/05/2019         Physical Exam   Constitutional: He is oriented to person, place, and time. He appears well-developed and well-nourished.   HENT:   Head: Normocephalic.   Right Ear: A middle ear effusion is present.   Left Ear: A middle ear effusion is present.   Nose: Mucosal edema and rhinorrhea present. Right sinus exhibits maxillary sinus tenderness and frontal sinus tenderness. Left " sinus exhibits maxillary sinus tenderness and frontal sinus tenderness.   Mouth/Throat: Mucous membranes are normal. Posterior oropharyngeal erythema present.   Posterior pharyngeal drip    Eyes: Pupils are equal, round, and reactive to light. Conjunctivae and EOM are normal.   Neck: Normal range of motion. Neck supple.   Cardiovascular: Normal rate, regular rhythm and normal heart sounds.   Pulmonary/Chest: Effort normal and breath sounds normal.   Abdominal: Soft. Bowel sounds are normal.   Musculoskeletal: Normal range of motion.   Neurological: He is alert and oriented to person, place, and time.   Skin: Skin is warm and dry.   Psychiatric: He has a normal mood and affect. His behavior is normal.   Vitals reviewed.      Assessment:       1. Sinusitis, bacterial    2. Cough        Plan:        Alessandro was seen today for sore throat, otalgia, cough and sinusitis.    Diagnoses and all orders for this visit:    Sinusitis, bacterial  -     triamcinolone acetonide injection 40 mg  -     amoxicillin-clavulanate 875-125mg (AUGMENTIN) 875-125 mg per tablet; Take 1 tablet by mouth every 12 (twelve) hours. Take 1 pill every 12  Hours for 10 days. for 10 days    Cough  -     guaiFENesin (MUCINEX) 600 mg 12 hr tablet; Take 2 tablets (1,200 mg total) by mouth 2 (two) times daily. for 10 days  -     benzonatate (TESSALON) 200 MG capsule; Take 1 capsule (200 mg total) by mouth 2 (two) times daily as needed.      Follow up with PCP if symptoms persist or worsen

## 2019-08-08 ENCOUNTER — OFFICE VISIT (OUTPATIENT)
Dept: UROLOGY | Facility: CLINIC | Age: 64
End: 2019-08-08
Payer: COMMERCIAL

## 2019-08-08 VITALS
SYSTOLIC BLOOD PRESSURE: 123 MMHG | DIASTOLIC BLOOD PRESSURE: 79 MMHG | WEIGHT: 176.38 LBS | HEART RATE: 67 BPM | BODY MASS INDEX: 27.68 KG/M2 | HEIGHT: 67 IN

## 2019-08-08 DIAGNOSIS — N52.9 ERECTILE DYSFUNCTION, UNSPECIFIED ERECTILE DYSFUNCTION TYPE: Primary | ICD-10-CM

## 2019-08-08 PROCEDURE — 99204 PR OFFICE/OUTPT VISIT, NEW, LEVL IV, 45-59 MIN: ICD-10-PCS | Mod: S$GLB,,, | Performed by: UROLOGY

## 2019-08-08 PROCEDURE — 99204 OFFICE O/P NEW MOD 45 MIN: CPT | Mod: S$GLB,,, | Performed by: UROLOGY

## 2019-08-08 PROCEDURE — 99999 PR PBB SHADOW E&M-EST. PATIENT-LVL III: CPT | Mod: PBBFAC,,, | Performed by: UROLOGY

## 2019-08-08 PROCEDURE — 3008F BODY MASS INDEX DOCD: CPT | Mod: CPTII,S$GLB,, | Performed by: UROLOGY

## 2019-08-08 PROCEDURE — 3008F PR BODY MASS INDEX (BMI) DOCUMENTED: ICD-10-PCS | Mod: CPTII,S$GLB,, | Performed by: UROLOGY

## 2019-08-08 PROCEDURE — 99999 PR PBB SHADOW E&M-EST. PATIENT-LVL III: ICD-10-PCS | Mod: PBBFAC,,, | Performed by: UROLOGY

## 2019-08-08 RX ORDER — TADALAFIL 20 MG/1
20 TABLET ORAL EVERY OTHER DAY
Qty: 8 TABLET | Refills: 11 | Status: SHIPPED | OUTPATIENT
Start: 2019-08-08 | End: 2020-12-03 | Stop reason: SDUPTHER

## 2019-08-08 NOTE — PROGRESS NOTES
Subjective:       Patient ID: Alessandro Cerna is a 64 y.o. male.    Chief Complaint: new patient (rountine check up last psa was 19 1.2 slow flow .)    HPI   Alessandro Cerna is a 64 y.o. male with a PMHx of GERD who presents to the clinic for a routine checkup. His PSA 4 months ago was 1.2. His AUA Sx score is a 6. He endorses low back pain, an intermittent weak stream, and occasional ED. Patient has been taking Cialis 10 mg.      Past Medical History:   Diagnosis Date    GERD (gastroesophageal reflux disease)     TB (pulmonary tuberculosis)     Treated [facility] 9 mo therapy.       History reviewed. No pertinent surgical history.    Family History   Problem Relation Age of Onset    Breast cancer Mother     Prostate cancer Father     Emphysema Neg Hx        Social History     Socioeconomic History    Marital status:      Spouse name: Not on file    Number of children: Not on file    Years of education: Not on file    Highest education level: Not on file   Occupational History    Not on file   Social Needs    Financial resource strain: Not on file    Food insecurity:     Worry: Not on file     Inability: Not on file    Transportation needs:     Medical: Not on file     Non-medical: Not on file   Tobacco Use    Smoking status: Former Smoker     Packs/day: 1.50     Years: 2.00     Pack years: 3.00     Types: Cigarettes     Last attempt to quit: 4/3/1977     Years since quittin.3    Smokeless tobacco: Never Used   Substance and Sexual Activity    Alcohol use: Yes     Alcohol/week: 1.0 oz     Comment: one drink/week    Drug use: No    Sexual activity: Yes     Partners: Female   Lifestyle    Physical activity:     Days per week: Not on file     Minutes per session: Not on file    Stress: Not on file   Relationships    Social connections:     Talks on phone: Not on file     Gets together: Not on file     Attends [employer] service: Not on file     Active member of club or  organization: Not on file     Attends meetings of clubs or organizations: Not on file     Relationship status: Not on file   Other Topics Concern    Not on file   Social History Narrative    Not on file       Allergies:  Patient has no known allergies.    Medications:    Current Outpatient Medications:     tadalafil (CIALIS) 10 MG tablet, 10 mg at least 30 minutes prior to anticipated sexual activity as one single dose and not more than once daily., Disp: 20 tablet, Rfl: 0    tadalafil (CIALIS) 20 MG Tab, Take 1 tablet (20 mg total) by mouth every other day. Take every other day as needed, Disp: 8 tablet, Rfl: 11    Review of Systems   Constitutional: Negative for activity change, appetite change, chills, diaphoresis, fatigue, fever and unexpected weight change.   HENT: Negative for congestion, dental problem, hearing loss, mouth sores, postnasal drip, rhinorrhea, sinus pressure and trouble swallowing.    Eyes: Negative for pain, discharge and itching.   Respiratory: Negative for apnea, cough, choking, chest tightness, shortness of breath and wheezing.    Cardiovascular: Negative for chest pain, palpitations and leg swelling.   Gastrointestinal: Negative for abdominal distention, abdominal pain, anal bleeding, blood in stool, constipation, diarrhea, nausea, rectal pain and vomiting.   Endocrine: Negative for polydipsia and polyuria.   Genitourinary: Positive for difficulty urinating (weak stream). Negative for decreased urine volume, discharge, dysuria, enuresis, flank pain, frequency, genital sores, hematuria, penile pain, penile swelling, scrotal swelling and urgency.   Musculoskeletal: Positive for back pain (lower back). Negative for arthralgias.   Skin: Negative for color change, rash and wound.   Neurological: Negative for dizziness, syncope, speech difficulty, light-headedness and headaches.   Hematological: Negative for adenopathy. Does not bruise/bleed easily.   Psychiatric/Behavioral: Negative for  behavioral problems and confusion.       Objective:      Physical Exam   Constitutional: He appears well-developed.   HENT:   Head: Normocephalic.   Neck: Neck supple.   Cardiovascular: Normal rate.    Pulmonary/Chest: Effort normal.   Abdominal: Soft.   Genitourinary:   Genitourinary Comments: PVR is 0 cc  Urine dip was clear with a pH of 5    Prostate was smooth without nodularity. No rectal masses.  25-30 grams. Normal perineum.     Neurological: He is alert.   Skin: Skin is warm.     Psychiatric: He has a normal mood and affect.         Labs   Ref Range & Units 4mo ago   PSA, SCREEN 0.00 - 4.00 ng/mL 1.2        Assessment:       1. Erectile dysfunction, unspecified erectile dysfunction type        Plan:       Alessandro was seen today for new patient.    Diagnoses and all orders for this visit:    Erectile dysfunction, unspecified erectile dysfunction type    Other orders  -     tadalafil (CIALIS) 20 MG Tab; Take 1 tablet (20 mg total) by mouth every other day. Take every other day as needed          Prescribe Cialis 20 mg.  RTC prn    Mila KEYS, am acting as a scribe on this patient encounter in the presence and under the supervision of Dr. Villalba.    08/08/2019 6:52 AM    I, Dr. Villalba, personally performed the services described in this documentation.   All medical record entries made by the scribe were at my direction and in my presence.   I have reviewed the chart and agree that the record is accurate and complete.   Hill Villalba MD.  6:52 AM 08/08/2019

## 2019-11-18 ENCOUNTER — PATIENT OUTREACH (OUTPATIENT)
Dept: ADMINISTRATIVE | Facility: OTHER | Age: 64
End: 2019-11-18

## 2019-11-20 ENCOUNTER — OFFICE VISIT (OUTPATIENT)
Dept: PODIATRY | Facility: CLINIC | Age: 64
End: 2019-11-20
Payer: COMMERCIAL

## 2019-11-20 VITALS
HEIGHT: 67 IN | BODY MASS INDEX: 27.62 KG/M2 | HEART RATE: 58 BPM | DIASTOLIC BLOOD PRESSURE: 75 MMHG | SYSTOLIC BLOOD PRESSURE: 123 MMHG | WEIGHT: 176 LBS

## 2019-11-20 DIAGNOSIS — M72.2 PLANTAR FASCIITIS: Primary | ICD-10-CM

## 2019-11-20 PROCEDURE — 20550 PR INJECT TENDON SHEATH/LIGAMENT: ICD-10-PCS | Mod: LT,S$GLB,, | Performed by: PODIATRIST

## 2019-11-20 PROCEDURE — 20550 NJX 1 TENDON SHEATH/LIGAMENT: CPT | Mod: LT,S$GLB,, | Performed by: PODIATRIST

## 2019-11-20 PROCEDURE — 3008F PR BODY MASS INDEX (BMI) DOCUMENTED: ICD-10-PCS | Mod: CPTII,S$GLB,, | Performed by: PODIATRIST

## 2019-11-20 PROCEDURE — 3008F BODY MASS INDEX DOCD: CPT | Mod: CPTII,S$GLB,, | Performed by: PODIATRIST

## 2019-11-20 PROCEDURE — 99999 PR PBB SHADOW E&M-EST. PATIENT-LVL III: ICD-10-PCS | Mod: PBBFAC,,, | Performed by: PODIATRIST

## 2019-11-20 PROCEDURE — 99999 PR PBB SHADOW E&M-EST. PATIENT-LVL III: CPT | Mod: PBBFAC,,, | Performed by: PODIATRIST

## 2019-11-20 PROCEDURE — 99213 PR OFFICE/OUTPT VISIT, EST, LEVL III, 20-29 MIN: ICD-10-PCS | Mod: 25,S$GLB,, | Performed by: PODIATRIST

## 2019-11-20 PROCEDURE — 99213 OFFICE O/P EST LOW 20 MIN: CPT | Mod: 25,S$GLB,, | Performed by: PODIATRIST

## 2019-11-20 RX ORDER — TRIAMCINOLONE ACETONIDE 40 MG/ML
40 INJECTION, SUSPENSION INTRA-ARTICULAR; INTRAMUSCULAR
Status: COMPLETED | OUTPATIENT
Start: 2019-11-20 | End: 2019-11-20

## 2019-11-20 RX ORDER — METHYLPREDNISOLONE 4 MG/1
TABLET ORAL
Qty: 1 PACKAGE | Refills: 0 | Status: SHIPPED | OUTPATIENT
Start: 2019-11-20 | End: 2019-12-18

## 2019-11-20 RX ADMIN — TRIAMCINOLONE ACETONIDE 40 MG: 40 INJECTION, SUSPENSION INTRA-ARTICULAR; INTRAMUSCULAR at 08:11

## 2019-11-20 NOTE — PROCEDURES
Procedures     Injection consisted of total of 2cc of kenalog with 0.5%marcaine plain injected into the plantar medial left heel. Skin was prepped with alcohol and ethyl chloride spray. Patient tolerated well with no complications and related relief following injection. Bandaid applied to injection site.     Patient is to use combination of conservative treatment and return for follow up/reassessment at that time as needed. Timeout was performed with pt in the room.

## 2019-11-20 NOTE — PROGRESS NOTES
Subjective:      Patient ID: Alessandro Cerna is a 64 y.o. male.    Chief Complaint: Ankle Pain (Left) and Heel Pain (Left)    64 y.o. male presenting with left heel pain. Describes pain as shape pain in the morning. Ambulating in casual shoe. No previous treatments other than stretching and waterbottle. Known to Dr. Moe and Roberto for R foot pain in the past. He is doing well for his right foot.       Review of Systems   Constitution: Negative for chills, decreased appetite, fever and malaise/fatigue.   HENT: Negative for congestion, ear discharge and sore throat.    Eyes: Negative for discharge and pain.   Cardiovascular: Negative for chest pain, claudication and leg swelling.   Respiratory: Negative for cough and shortness of breath.    Skin: Negative for color change, nail changes and rash.   Musculoskeletal: Negative for arthritis, joint pain, joint swelling and muscle weakness.        L heel pain   Gastrointestinal: Negative for bloating, abdominal pain, diarrhea, nausea and vomiting.   Genitourinary: Negative for flank pain and hematuria.   Neurological: Negative for headaches, numbness and weakness.   Psychiatric/Behavioral: Negative for altered mental status.             Past Medical History:   Diagnosis Date    GERD (gastroesophageal reflux disease)     TB (pulmonary tuberculosis) 1982    Treated [facility] 9 mo therapy.       History reviewed. No pertinent surgical history.    Family History   Problem Relation Age of Onset    Breast cancer Mother     Prostate cancer Father     Emphysema Neg Hx        Social History     Socioeconomic History    Marital status:      Spouse name: Not on file    Number of children: Not on file    Years of education: Not on file    Highest education level: Not on file   Occupational History    Not on file   Social Needs    Financial resource strain: Not on file    Food insecurity:     Worry: Not on file     Inability: Not on file    Transportation needs:  "    Medical: Not on file     Non-medical: Not on file   Tobacco Use    Smoking status: Former Smoker     Packs/day: 1.50     Years: 2.00     Pack years: 3.00     Types: Cigarettes     Last attempt to quit: 4/3/1977     Years since quittin.6    Smokeless tobacco: Never Used   Substance and Sexual Activity    Alcohol use: Yes     Alcohol/week: 1.7 standard drinks     Comment: one drink/week    Drug use: No    Sexual activity: Yes     Partners: Female   Lifestyle    Physical activity:     Days per week: Not on file     Minutes per session: Not on file    Stress: Not on file   Relationships    Social connections:     Talks on phone: Not on file     Gets together: Not on file     Attends [employer] service: Not on file     Active member of club or organization: Not on file     Attends meetings of clubs or organizations: Not on file     Relationship status: Not on file   Other Topics Concern    Not on file   Social History Narrative    Not on file       Current Outpatient Medications   Medication Sig Dispense Refill    tadalafil (CIALIS) 10 MG tablet 10 mg at least 30 minutes prior to anticipated sexual activity as one single dose and not more than once daily. 20 tablet 0    tadalafil (CIALIS) 20 MG Tab Take 1 tablet (20 mg total) by mouth every other day. Take every other day as needed 8 tablet 11    methylPREDNISolone (MEDROL DOSEPACK) 4 mg tablet use as directed 1 Package 0     No current facility-administered medications for this visit.        Review of patient's allergies indicates:  No Known Allergies    Vitals:    19 0754   BP: 123/75   Pulse: (!) 58   Weight: 79.8 kg (176 lb)   Height: 5' 7" (1.702 m)   PainSc:   7   PainLoc: Ankle       Objective:      Physical Exam   Constitutional: He is oriented to person, place, and time. He appears well-developed and well-nourished. No distress.   HENT:   Nose: Nose normal.   Eyes: Conjunctivae are normal.   Neck: Normal range of motion. "   Pulmonary/Chest: Effort normal. He exhibits no tenderness.   Abdominal: There is no tenderness.   Neurological: He is alert and oriented to person, place, and time.   Psychiatric: He has a normal mood and affect. His behavior is normal.       Vascular: Distal DP/PT pulses palpable 2/4. CRT < 3 sec to tips of toes. No vericosities noted to LEs. Hair growth present LE, warm to touch LE, No edema noted to LE.    Dermatologic: No open lesions, lacerations or wounds. Interdigital spaces clean, dry and intact. No erythema, rubor, calor noted LE    Musculoskeletal: MMT 5/5 in DF/PF/Inv/Ev resistance with no reproduction of pain in any direction. Passive range of motion of ankle and pedal joints is painless. No calf tenderness LE, Compartments soft/compressible.   L foot: ttp plantar heel at insertion of plantar fascia. No pain of the calc upon medial and lateral squeezing.     Neurological: Light touch, proprioception, and sharp/dull sensation are all intact. Protective threshold with the Gravelly-Wienstein monofilament is intact. Vibratory sensation intact.         Assessment:       Encounter Diagnosis   Name Primary?    Plantar fasciitis - Left Foot Yes         Plan:       Alessandro was seen today for ankle pain and heel pain.    Diagnoses and all orders for this visit:    Plantar fasciitis - Left Foot    Other orders  -     methylPREDNISolone (MEDROL DOSEPACK) 4 mg tablet; use as directed      I counseled the patient on his conditions, their implications and medical management.    64 y.o. male L plantar fasciitis.     -rx. Medrol dosepack   -s/p steroid injection. Pt tolerated well. See procedure note.  -c/w stretching and water bottle exercise. Instructions for both given to patient.  -Discussed different treatment options for heel pain. including conservative and surgical.  I gave written and verbal instructions on heel cord stretching and this was demonstrated for the patient. Patient expressed understanding.  Discussed wearing appropriate shoe gear and avoiding flats, slippers, sandals, barefoot, and sockfeet.   -c/w OTC anti inflammatory medication  -The nature of the condition, options for management, as well as potential risks and complications were discussed in detail with patient. Patient was amenable to my recommendations and left my office fully informed and will follow up as instructed or sooner if necessary.    -Patient was advised of signs and symptoms of infection including redness, drainage, purulence, odor, streaking, fever, chills and I advised patient to seek medical attention (ER or urgent care) if these symptoms arise.   -f/u prn      Note dictated with voice recognition software, please excuse any grammatical errors.

## 2019-11-26 ENCOUNTER — TELEPHONE (OUTPATIENT)
Dept: PODIATRY | Facility: CLINIC | Age: 64
End: 2019-11-26

## 2019-11-27 ENCOUNTER — HOSPITAL ENCOUNTER (EMERGENCY)
Facility: HOSPITAL | Age: 64
Discharge: HOME | End: 2019-11-27
Attending: EMERGENCY MEDICINE
Payer: COMMERCIAL

## 2019-11-27 DIAGNOSIS — M79.673 FOOT PAIN: ICD-10-CM

## 2019-11-27 DIAGNOSIS — M72.2 PLANTAR FASCIITIS, LEFT: Primary | ICD-10-CM

## 2019-11-27 PROCEDURE — 99283 EMERGENCY DEPT VISIT LOW MDM: CPT | Mod: 25

## 2019-11-27 RX ORDER — CEPHALEXIN 500 MG/1
500 CAPSULE ORAL 4 TIMES DAILY
Qty: 20 CAPSULE | Refills: 0 | Status: SHIPPED | OUTPATIENT
Start: 2019-11-27 | End: 2019-12-02

## 2019-11-28 VITALS
DIASTOLIC BLOOD PRESSURE: 75 MMHG | OXYGEN SATURATION: 97 % | HEIGHT: 67 IN | RESPIRATION RATE: 18 BRPM | BODY MASS INDEX: 27.47 KG/M2 | TEMPERATURE: 98 F | HEART RATE: 82 BPM | WEIGHT: 175 LBS | SYSTOLIC BLOOD PRESSURE: 118 MMHG

## 2019-11-28 NOTE — ED PROVIDER NOTES
Encounter Date: 2019    SCRIBE #1 NOTE: I, Cammy Medellin , am scribing for, and in the presence of,  Dr. Rueda . I have scribed the entire note.       History     Chief Complaint   Patient presents with    Foot Pain     L foot pain and swelling after receiving injection for plantar fasciitis on . now, whith redness/inflammation to L lateral foot opposite of the injection site.     Time seen by provider: 9:39 PM    This is a 64 y.o. male who presents with complaint of left foot pain for the past 3 days. Pt reports he recently received an injection for plantar fascitis on 2019 and has since been experiencing pain. He notes radiation of pain up the back of his calf. He denies any fever, chills, redness or any other complaints at this time. Pt has been taking NSAID's with no relief.     The history is provided by the patient.     Review of patient's allergies indicates:  No Known Allergies  Past Medical History:   Diagnosis Date    GERD (gastroesophageal reflux disease)     TB (pulmonary tuberculosis)     Treated [facility] 9 mo therapy.     No past surgical history on file.  Family History   Problem Relation Age of Onset    Breast cancer Mother     Prostate cancer Father     Emphysema Neg Hx      Social History     Tobacco Use    Smoking status: Former Smoker     Packs/day: 1.50     Years: 2.00     Pack years: 3.00     Types: Cigarettes     Last attempt to quit: 4/3/1977     Years since quittin.6    Smokeless tobacco: Never Used   Substance Use Topics    Alcohol use: Yes     Alcohol/week: 1.7 standard drinks     Comment: one drink/week    Drug use: No     Review of Systems   Constitutional: Negative for chills and fever.   Musculoskeletal:        + foot pain    All other systems reviewed and are negative.      Physical Exam     Initial Vitals [19]   BP Pulse Resp Temp SpO2   139/82 90 16 99 °F (37.2 °C) 98 %      MAP       --         Physical Exam    Nursing note and vitals  reviewed.  Constitutional: He appears well-developed and well-nourished.   HENT:   Head: Normocephalic and atraumatic.   Nose: Nose normal.   Eyes: EOM are normal.   Pulmonary/Chest: No respiratory distress.   Musculoskeletal: He exhibits tenderness. He exhibits no edema.   Grossly normal   No gas crepitance   reproducible tenderness along the plantar aspect and lateral aspect of the left foot    Neurological: He is alert and oriented to person, place, and time.   Skin: Skin is warm and dry.   Psychiatric: He has a normal mood and affect. His behavior is normal. Thought content normal.         ED Course   Procedures  Labs Reviewed - No data to display       Imaging Results          X-Ray Foot Complete Left (Final result)  Result time 11/27/19 22:30:00    Final result by Sandor Barrett MD (11/27/19 22:30:00)                 Impression:      No acute process.      Electronically signed by: Sandor Barrett MD  Date:    11/27/2019  Time:    22:30             Narrative:    EXAMINATION:  XR FOOT COMPLETE 3 VIEW LEFT    CLINICAL HISTORY:  Pain in unspecified foot    TECHNIQUE:  AP, lateral and oblique views of the left foot were performed.    COMPARISON:  None    FINDINGS:  There is mild demineralization of the osseous structures.  No cortical step-off is identified.  There is no evidence of periostitis.    There is expected joint space narrowing involving the left foot.  The Lisfranc articulation is maintained.    The soft tissues are unremarkable.  No radiopaque foreign body is identified.  There is no evidence of a fracture or dislocation of the left foot.                                 Medical Decision Making:   Initial Assessment:   64-year-old male, no medical problems including severe for evaluation of left foot pain.  Reports a diagnosis of plantar fasciitis, received recent steroid injection , was complaining worsening pain.  Offered to come to the ER.  He reports he had some medicine aspect his foot.  He has been on  Medrol Dosepak naproxen.  He overall well-appearing, no acute distress, minor erythema noted of the lateral aspect of left foot, no gas crepitus.  Discussed with patient plan to obtain x-ray, and will reassess.  Clinical Tests:   Radiological Study: Ordered and Reviewed              Attending Attestation:           Physician Attestation for Scribe:  Physician Attestation Statement for Scribe #1: I, Dr. Rueda , reviewed documentation, as scribed by Cammy Medellin in my presence, and it is both accurate and complete.                 ED Course as of Nov 28 0605   Wed Nov 27, 2019   2244 Resting comfortably in bed, no acute distress, labs negative for acute process.  I discussed with patient diagnosis of likely plantar fasciitis.  There is mild erythema noted lateral aspect of foot, can't rule out infection, although no systemic signs. I discussed with patient plan to discharge home, continue Medrol Dosepak and naproxen, will give Ace wrap, and Keflex.  Discussed return precautions, will follow up with appointment with podiatrist.  Family understood agreed plan, patient will be discharged.    [SE]      ED Course User Index  [SE] Len Rueda MD                Clinical Impression:       ICD-10-CM ICD-9-CM   1. Plantar fasciitis, left M72.2 728.71   2. Foot pain M79.673 729.5         Disposition:   Disposition: Discharged  Condition: Stable                     Len Rueda MD  11/28/19 0605

## 2019-11-28 NOTE — ED NOTES
Pt reports that he had an injection in the L medial side of foot to treat pain that he was having in his heel on 11/20/2019. Pt states that around 2 days after the injection, he started having increased pain to the L lateral side of his foot and started to have difficulty walking. Pt states that the pain has gotten increasingly worse and pt states he can't bear any weight on his foot. Pt's L lateral side of foot is warm, red, and swollen. Pt has full ROM of extremity. Pt is uncomfortable during palpation but otherwise stable and without distress.

## 2019-11-28 NOTE — ED NOTES
Pt's L foot wrapped with ACE wrap for comfort. Pt's pulses 2+ after placement and sensation is present. Pt verbalized understanding to d/c instructions and follow up care.

## 2019-12-04 ENCOUNTER — PATIENT OUTREACH (OUTPATIENT)
Dept: ADMINISTRATIVE | Facility: OTHER | Age: 64
End: 2019-12-04

## 2019-12-05 ENCOUNTER — OFFICE VISIT (OUTPATIENT)
Dept: PODIATRY | Facility: CLINIC | Age: 64
End: 2019-12-05
Payer: COMMERCIAL

## 2019-12-05 VITALS
RESPIRATION RATE: 16 BRPM | BODY MASS INDEX: 28.02 KG/M2 | WEIGHT: 178.5 LBS | HEART RATE: 68 BPM | DIASTOLIC BLOOD PRESSURE: 78 MMHG | HEIGHT: 67 IN | SYSTOLIC BLOOD PRESSURE: 130 MMHG

## 2019-12-05 DIAGNOSIS — M72.2 PLANTAR FASCIITIS: ICD-10-CM

## 2019-12-05 DIAGNOSIS — L03.116 CELLULITIS OF LEFT FOOT: Primary | ICD-10-CM

## 2019-12-05 PROCEDURE — 99213 PR OFFICE/OUTPT VISIT, EST, LEVL III, 20-29 MIN: ICD-10-PCS | Mod: S$GLB,,, | Performed by: PODIATRIST

## 2019-12-05 PROCEDURE — 99999 PR PBB SHADOW E&M-EST. PATIENT-LVL III: ICD-10-PCS | Mod: PBBFAC,,, | Performed by: PODIATRIST

## 2019-12-05 PROCEDURE — 3008F PR BODY MASS INDEX (BMI) DOCUMENTED: ICD-10-PCS | Mod: CPTII,S$GLB,, | Performed by: PODIATRIST

## 2019-12-05 PROCEDURE — 3008F BODY MASS INDEX DOCD: CPT | Mod: CPTII,S$GLB,, | Performed by: PODIATRIST

## 2019-12-05 PROCEDURE — 99999 PR PBB SHADOW E&M-EST. PATIENT-LVL III: CPT | Mod: PBBFAC,,, | Performed by: PODIATRIST

## 2019-12-05 PROCEDURE — 99213 OFFICE O/P EST LOW 20 MIN: CPT | Mod: S$GLB,,, | Performed by: PODIATRIST

## 2019-12-05 RX ORDER — CEPHALEXIN 500 MG/1
500 CAPSULE ORAL EVERY 6 HOURS
Qty: 20 CAPSULE | Refills: 0 | Status: SHIPPED | OUTPATIENT
Start: 2019-12-05 | End: 2019-12-10

## 2019-12-05 NOTE — PROGRESS NOTES
Subjective:      Patient ID: Alessandro Cerna is a 64 y.o. male.    Chief Complaint: Foot Pain (left) and swelling (left foot)    64 y.o. male presenting with left heel pain. Describes pain as shape pain in the morning. Ambulating in casual shoe. No previous treatments other than stretching and waterbottle. Known to Dr. Moe and Roberto for R foot pain in the past. He is doing well for his right foot.     12/5/19 s/p L plantar fasciitis injection on 11/20th. Did not help with pain.  I also prescribed Medrol Dosepak which did not help with the symptoms. Went to ED because of worsening of pain on medial and lateral aspect of the heel with swelling and redness. Was rx'ed keflex for 5 days. Seems to help with pain and redness improved little. Ambulating in normal tennis shoe. xrays in the ED was negative. Denies N/V/F/C/SOB/CP.       Review of Systems   Constitution: Negative for chills, decreased appetite, fever and malaise/fatigue.   HENT: Negative for congestion, ear discharge and sore throat.    Eyes: Negative for discharge and pain.   Cardiovascular: Negative for chest pain, claudication and leg swelling.   Respiratory: Negative for cough and shortness of breath.    Skin: Negative for color change, nail changes and rash.   Musculoskeletal: Negative for arthritis, joint pain, joint swelling and muscle weakness.        L heel pain   Gastrointestinal: Negative for bloating, abdominal pain, diarrhea, nausea and vomiting.   Genitourinary: Negative for flank pain and hematuria.   Neurological: Negative for headaches, numbness and weakness.   Psychiatric/Behavioral: Negative for altered mental status.             Past Medical History:   Diagnosis Date    GERD (gastroesophageal reflux disease)     TB (pulmonary tuberculosis) 1982    Treated [facility] 9 mo therapy.       History reviewed. No pertinent surgical history.    Family History   Problem Relation Age of Onset    Breast cancer Mother     Prostate cancer Father      Emphysema Neg Hx        Social History     Socioeconomic History    Marital status:      Spouse name: Not on file    Number of children: Not on file    Years of education: Not on file    Highest education level: Not on file   Occupational History    Not on file   Social Needs    Financial resource strain: Not on file    Food insecurity:     Worry: Not on file     Inability: Not on file    Transportation needs:     Medical: Not on file     Non-medical: Not on file   Tobacco Use    Smoking status: Former Smoker     Packs/day: 1.50     Years: 2.00     Pack years: 3.00     Types: Cigarettes     Last attempt to quit: 4/3/1977     Years since quittin.7    Smokeless tobacco: Never Used   Substance and Sexual Activity    Alcohol use: Yes     Alcohol/week: 1.7 standard drinks     Comment: one drink/week    Drug use: No    Sexual activity: Yes     Partners: Female   Lifestyle    Physical activity:     Days per week: Not on file     Minutes per session: Not on file    Stress: Not on file   Relationships    Social connections:     Talks on phone: Not on file     Gets together: Not on file     Attends Mosque service: Not on file     Active member of club or organization: Not on file     Attends meetings of clubs or organizations: Not on file     Relationship status: Not on file   Other Topics Concern    Not on file   Social History Narrative    Not on file       Current Outpatient Medications   Medication Sig Dispense Refill    tadalafil (CIALIS) 20 MG Tab Take 1 tablet (20 mg total) by mouth every other day. Take every other day as needed 8 tablet 11    cephALEXin (KEFLEX) 500 MG capsule Take 1 capsule (500 mg total) by mouth every 6 (six) hours. for 5 days 20 capsule 0    methylPREDNISolone (MEDROL DOSEPACK) 4 mg tablet use as directed (Patient not taking: Reported on 2019) 1 Package 0    naproxen (NAPROSYN) 500 MG tablet Take 1 tablet (500 mg total) by mouth 2 (two) times daily.  "(Patient not taking: Reported on 12/5/2019) 30 tablet 1    tadalafil (CIALIS) 10 MG tablet 10 mg at least 30 minutes prior to anticipated sexual activity as one single dose and not more than once daily. (Patient not taking: Reported on 12/5/2019) 20 tablet 0     No current facility-administered medications for this visit.        Review of patient's allergies indicates:  No Known Allergies    Vitals:    12/05/19 1310   BP: 130/78   Pulse: 68   Resp: 16   Weight: 81 kg (178 lb 8 oz)   Height: 5' 7" (1.702 m)   PainSc: 10-Worst pain ever   PainLoc: Foot       Objective:      Physical Exam   Constitutional: He is oriented to person, place, and time. He appears well-developed and well-nourished. No distress.   HENT:   Nose: Nose normal.   Eyes: Conjunctivae are normal.   Neck: Normal range of motion.   Pulmonary/Chest: Effort normal. He exhibits no tenderness.   Abdominal: There is no tenderness.   Neurological: He is alert and oriented to person, place, and time.   Psychiatric: He has a normal mood and affect. His behavior is normal.       Vascular: Distal DP/PT pulses palpable 2/4. CRT < 3 sec to tips of toes. No vericosities noted to LEs. Hair growth present LE, warm to touch LE  L foot: mild to moderate swelling medial and lateral heel.     Dermatologic: No open lesions, lacerations or wounds. Interdigital spaces clean, dry and intact.   L foot: rubor and mild erythema lateral and medial heel. No obvious fluctuance, no crepitus, no open wounds.     Musculoskeletal: MMT 5/5 in DF/PF/Inv/Ev resistance with no reproduction of pain in any direction. Passive range of motion of ankle and pedal joints is painless. No calf tenderness LE, Compartments soft/compressible.   L foot: ttp medial and lateral heel and diffuse pain back of the ankle.     Neurological: Light touch, proprioception, and sharp/dull sensation are all intact. Protective threshold with the Clinton-Wienstein monofilament is intact. Vibratory sensation " intact.     12/5/19          Assessment:       Encounter Diagnoses   Name Primary?    Cellulitis of left foot - Left Foot Yes    Plantar fasciitis - Left Foot          Plan:       Alessandro was seen today for foot pain and belepharitis.    Diagnoses and all orders for this visit:    Cellulitis of left foot - Left Foot  -     MRI Foot (Hindfoot) Left Without Contrast; Future    Plantar fasciitis - Left Foot    Other orders  -     cephALEXin (KEFLEX) 500 MG capsule; Take 1 capsule (500 mg total) by mouth every 6 (six) hours. for 5 days      I counseled the patient on his conditions, their implications and medical management.    64 y.o. male L plantar fasciitis status post steroid injection with cellulitis.     -I renewed Keflex for 5 more days.  Some rubor with erythema medial lateral aspect of the heel. Healing response with oral abx.   -rx. MRI to r/o abscess  -The nature of the condition, options for management, as well as potential risks and complications were discussed in detail with patient. Patient was amenable to my recommendations and left my office fully informed and will follow up as instructed or sooner if necessary.    -The nature of the condition, options for management, as well as potential risks and complications were discussed in detail with patient. Patient was amenable to my recommendations and left my office fully informed and will follow up as instructed or sooner if necessary.    -Patient was advised of signs and symptoms of infection including redness, drainage, purulence, odor, streaking, fever, chills and I advised patient to seek medical attention (ER or urgent care) if these symptoms arise.   -f/u 1 week.     Note dictated with voice recognition software, please excuse any grammatical errors.

## 2019-12-06 NOTE — PROGRESS NOTES
(Portions of this note were dictated using voice recognition software and may contain dictation related errors in spelling/grammar/syntax not found on text review)    CC:   Chief Complaint   Patient presents with    Foot Swelling       HPI: 64 y.o. male Pt of Jamaal AquinocorrieDO jennifer [facility].    In November when for plantar fasciitis, had a steroid shot.  3 days later started with redness and swelling of his left foot.  Had gotten Medrol Dosepak for plantar fasciitis symptoms as well.  Had gone to ER and was diagnosed with cellulitis.  Started on Keflex for 5 days.  Felt that it helped very slightly but not enough.  Foot was still swollen and red.  Saw his podiatrist on , got another course of Keflex.  Has been taking for the last couple of days but does not notice any significant improvement in infected foot seems like it is getting more swollen and more red and hot and painful.  No fevers or chills or nausea or vomiting.  Has been using some cold treatments to the foot and keeping it elevated.  Had an x-ray in the ER which did not suggest any bony involvement.  He does have an MRI scheduled with his podiatrist but trying to get insurance approval for this.          Past Medical History:   Diagnosis Date    GERD (gastroesophageal reflux disease)     TB (pulmonary tuberculosis)     Treated [facility] 9 mo therapy.       No past surgical history on file.    Family History   Problem Relation Age of Onset    Breast cancer Mother     Prostate cancer Father     Emphysema Neg Hx        Social History     Tobacco Use    Smoking status: Former Smoker     Packs/day: 1.50     Years: 2.00     Pack years: 3.00     Types: Cigarettes     Last attempt to quit: 4/3/1977     Years since quittin.7    Smokeless tobacco: Never Used   Substance Use Topics    Alcohol use: Yes     Alcohol/week: 1.7 standard drinks     Comment: one drink/week    Drug use: No       Lab Results   Component  Value Date    WBC 4.10 04/05/2019    HGB 14.2 04/05/2019    HCT 43.7 04/05/2019    MCV 95 04/05/2019     04/05/2019    CHOL 170 04/05/2019    TRIG 91 04/05/2019    HDL 35 (L) 04/05/2019    ALT 16 04/05/2019    AST 23 04/05/2019    BILITOT 1.1 (H) 04/05/2019    ALKPHOS 90 04/05/2019     04/05/2019    K 4.4 04/05/2019     04/05/2019    CREATININE 0.9 04/05/2019    ESTGFRAFRICA >60.0 04/05/2019    EGFRNONAA >60.0 04/05/2019    CALCIUM 9.1 04/05/2019    ALBUMIN 4.0 04/05/2019    BUN 16 04/05/2019    CO2 27 04/05/2019    TSH 1.698 04/05/2019    PSA 1.2 04/05/2019    INR 1.1 04/28/2015    HGBA1C 5.1 04/05/2019    LDLCALC 116.8 04/05/2019    GLU 97 04/05/2019                 ROS:  GENERAL: No fever, chills, fatigability or weight loss.  SKIN: No rashes, no itching.  HEAD: No headaches.  EYES: No visual changes  EARS: No ear pain or changes in hearing.  NOSE: No congestion or rhinorrhea.  MOUTH & THROAT: No hoarseness, change in voice, or sore throat.  NODES: Denies swollen glands.  CHEST: Denies CROOK, cyanosis, wheezing, cough and sputum production.  CARDIOVASCULAR: Denies chest pain, PND, orthopnea.  ABDOMEN: No nausea, vomiting, or changes in bowel function.  URINARY: No flank pain, dysuria or hematuria.  PERIPHERAL VASCULAR:  Above  MUSCULOSKELETAL:  Above.  NEUROLOGIC: No weakness or numbness.    Vital signs reviewed  PE:   APPEARANCE: Well nourished, well developed, in no acute distress.    HEAD: Normocephalic, atraumatic.  EYES:  Conjunctivae noninjected.  NECK: Supple with no cervical lymphadenopathy.    CHEST: Good inspiratory effort. Lungs clear to auscultation with no wheezes or crackles.  CARDIOVASCULAR: Normal S1, S2. No rubs, murmurs, or gallops.  ABDOMEN: Bowel sounds normal. Not distended. Soft. No tenderness or masses. No organomegaly.  EXTREMITIES:  Edema, warmth, tenderness of left hindfoot lateral medial aspects posterior and inferior to the malleoli.  He has additionally edema and  warmth to the mid foot without significant tenderness to palpation and without any significant edema.  Active range of motion of the ankle painful in all directions but full.  Passive range of motion does not elicit any significant pain. Normal distal pulses.  Normal sensation.      IMPRESSION    1. Cellulitis of foot, left        PLAN  Has been on now 2nd round of Keflex, feels like symptoms are getting worse.  Will discontinue and start clindamycin 4 times a day 300 mg for 7 days.  Reviewed ER documentation as summarized above along with his podiatrist and PCP documentation.  Reviewed his x-ray results.  His baseline labs from April reviewed.  I do not think at this point he needs repeat labs urgently but I have recommended if his symptoms do not improve, he may need repeat lab work, going through with the planned MRI of the foot with his podiatrist.  Additionally given location, may potentially need additional coverage for Pseudomonas with quinolone if symptoms are not improving with the clindamycin.  He has appointment coming up with his podiatrist on December 12th this week.  Advised that he needs to seek more urgent care if the symptoms are getting rapidly worsen the time frame.

## 2019-12-07 ENCOUNTER — OFFICE VISIT (OUTPATIENT)
Dept: FAMILY MEDICINE | Facility: CLINIC | Age: 64
End: 2019-12-07
Payer: COMMERCIAL

## 2019-12-07 VITALS
WEIGHT: 175.94 LBS | DIASTOLIC BLOOD PRESSURE: 78 MMHG | HEIGHT: 67 IN | OXYGEN SATURATION: 98 % | TEMPERATURE: 98 F | HEART RATE: 76 BPM | BODY MASS INDEX: 27.61 KG/M2 | SYSTOLIC BLOOD PRESSURE: 112 MMHG

## 2019-12-07 DIAGNOSIS — L03.116 CELLULITIS OF FOOT, LEFT: Primary | ICD-10-CM

## 2019-12-07 PROCEDURE — 99999 PR PBB SHADOW E&M-EST. PATIENT-LVL IV: ICD-10-PCS | Mod: PBBFAC,,, | Performed by: FAMILY MEDICINE

## 2019-12-07 PROCEDURE — 99215 OFFICE O/P EST HI 40 MIN: CPT | Mod: S$GLB,,, | Performed by: FAMILY MEDICINE

## 2019-12-07 PROCEDURE — 99999 PR PBB SHADOW E&M-EST. PATIENT-LVL IV: CPT | Mod: PBBFAC,,, | Performed by: FAMILY MEDICINE

## 2019-12-07 PROCEDURE — 3008F BODY MASS INDEX DOCD: CPT | Mod: CPTII,S$GLB,, | Performed by: FAMILY MEDICINE

## 2019-12-07 PROCEDURE — 3008F PR BODY MASS INDEX (BMI) DOCUMENTED: ICD-10-PCS | Mod: CPTII,S$GLB,, | Performed by: FAMILY MEDICINE

## 2019-12-07 PROCEDURE — 99215 PR OFFICE/OUTPT VISIT, EST, LEVL V, 40-54 MIN: ICD-10-PCS | Mod: S$GLB,,, | Performed by: FAMILY MEDICINE

## 2019-12-07 RX ORDER — CLINDAMYCIN HYDROCHLORIDE 300 MG/1
300 CAPSULE ORAL 4 TIMES DAILY
Qty: 28 CAPSULE | Refills: 0 | Status: SHIPPED | OUTPATIENT
Start: 2019-12-07 | End: 2019-12-18

## 2019-12-07 RX ORDER — CLINDAMYCIN HYDROCHLORIDE 300 MG/1
300 CAPSULE ORAL 4 TIMES DAILY
Qty: 14 CAPSULE | Refills: 0 | Status: SHIPPED | OUTPATIENT
Start: 2019-12-07 | End: 2019-12-07 | Stop reason: SDUPTHER

## 2019-12-07 NOTE — PATIENT INSTRUCTIONS
Start clindamycin 300 mg 4 times a day for the next 7 days    Okay to stop Keflex    Cold topical compresses to the foot to help with swelling. Keep foot elevated    Make sure to follow up with your podiatrist as scheduled this week.  If it is getting worse, you may need to repeat imaging of the foot and perhaps a different antibiotic to cover additional bacteria called Pseudomonas.    Discharge Instructions for Cellulitis  You have been diagnosed with cellulitis. This is an infection in the deepest layer of the skin. In some cases, the infection also affects the muscle. Cellulitis is caused by bacteria. The bacteria can enter the body through broken skin. This can happen with a cut, scratch, animal bite, or an insect bite that has been scratched. You may have been treated in the hospital with antibiotics and fluids. You will likely be given a prescription for antibiotics to take at home. This sheet will help you take care of yourself at home.  Home care  When you are home:  · Take the prescribed antibiotic medicine you are given as directed until it is gone. Take it even if you feel better. It treats the infection and stops it from returning. Not taking all the medicine can make future infections hard to treat.  · Keep the infected area clean.  · When possible, raise the infected area above the level of your heart. This helps keep swelling down.  · Talk with your healthcare provider if you are in pain. Ask what kind of over-the-counter medicine you can take for pain.  · Apply clean bandages as advised.  · Take your temperature once a day for a week.  · Wash your hands often to prevent spreading the infection.  In the future, wash your hands before and after you touch cuts, scratches, or bandages. This will help prevent infection.   When to call your healthcare provider  Call your healthcare provider immediately if you have any of the following:  · Difficulty or pain when moving the joints above or below the  infected area  · Discharge or pus draining from the area  · Fever of 100.4°F (38°C) or higher, or as directed by your healthcare provider  · Pain that gets worse in or around the infected   · Redness that gets worse in or around the infected area, particularly if the area of redness expands to a wider area  · Shaking chills  · Swelling of the infected area  · Vomiting   Date Last Reviewed: 8/1/2016  © 0769-2624 [employer]. [address] [place] [address], [place] [address]. All rights reserved. This information is not intended as a substitute for professional medical care. Always follow your healthcare professional's instructions.

## 2019-12-10 ENCOUNTER — PATIENT OUTREACH (OUTPATIENT)
Dept: ADMINISTRATIVE | Facility: OTHER | Age: 64
End: 2019-12-10

## 2019-12-12 ENCOUNTER — HOSPITAL ENCOUNTER (OUTPATIENT)
Dept: RADIOLOGY | Facility: HOSPITAL | Age: 64
Discharge: HOME | End: 2019-12-12
Attending: PODIATRIST
Payer: COMMERCIAL

## 2019-12-12 ENCOUNTER — OFFICE VISIT (OUTPATIENT)
Dept: PODIATRY | Facility: CLINIC | Age: 64
End: 2019-12-12
Payer: COMMERCIAL

## 2019-12-12 VITALS
HEART RATE: 75 BPM | SYSTOLIC BLOOD PRESSURE: 113 MMHG | WEIGHT: 174.63 LBS | DIASTOLIC BLOOD PRESSURE: 74 MMHG | BODY MASS INDEX: 27.41 KG/M2 | HEIGHT: 67 IN

## 2019-12-12 DIAGNOSIS — L03.116 CELLULITIS OF LEFT LOWER EXTREMITY: Primary | ICD-10-CM

## 2019-12-12 PROCEDURE — 3008F BODY MASS INDEX DOCD: CPT | Mod: CPTII,S$GLB,, | Performed by: PODIATRIST

## 2019-12-12 PROCEDURE — 3008F PR BODY MASS INDEX (BMI) DOCUMENTED: ICD-10-PCS | Mod: CPTII,S$GLB,, | Performed by: PODIATRIST

## 2019-12-12 PROCEDURE — 73718 MRI FOOT (HINDFOOT) LEFT WITHOUT CONTRAST: ICD-10-PCS | Mod: 26,LT,, | Performed by: RADIOLOGY

## 2019-12-12 PROCEDURE — 99999 PR PBB SHADOW E&M-EST. PATIENT-LVL III: CPT | Mod: PBBFAC,,, | Performed by: PODIATRIST

## 2019-12-12 PROCEDURE — 73718 MRI LOWER EXTREMITY W/O DYE: CPT | Mod: TC,LT

## 2019-12-12 PROCEDURE — 99999 PR PBB SHADOW E&M-EST. PATIENT-LVL III: ICD-10-PCS | Mod: PBBFAC,,, | Performed by: PODIATRIST

## 2019-12-12 PROCEDURE — 99213 OFFICE O/P EST LOW 20 MIN: CPT | Mod: S$GLB,,, | Performed by: PODIATRIST

## 2019-12-12 PROCEDURE — 73718 MRI LOWER EXTREMITY W/O DYE: CPT | Mod: 26,LT,, | Performed by: RADIOLOGY

## 2019-12-12 PROCEDURE — 99213 PR OFFICE/OUTPT VISIT, EST, LEVL III, 20-29 MIN: ICD-10-PCS | Mod: S$GLB,,, | Performed by: PODIATRIST

## 2019-12-12 NOTE — PROGRESS NOTES
Subjective:      Patient ID: Alessandro Cerna is a 64 y.o. male.    Chief Complaint: Foot Pain (left) and swelling (left foot)    64 y.o. male presenting with left heel pain. Describes pain as shape pain in the morning. Ambulating in casual shoe. No previous treatments other than stretching and waterbottle. Known to Dr. Moe and Roberto for R foot pain in the past. He is doing well for his right foot.     12/5/19 s/p L plantar fasciitis injection on 11/20th. Did not help with pain.  I also prescribed Medrol Dosepak which did not help with the symptoms. Went to ED because of worsening of pain on medial and lateral aspect of the heel with swelling and redness. Was rx'ed keflex for 5 days. Seems to help with pain and redness improved little. Ambulating in normal tennis shoe. xrays in the ED was negative. Denies N/V/F/C/SOB/CP.     12/12/19 f/u L hindfoot pain s/p L plantar fascia injection. Taking clindamycin (from PCP). Was taking keflex and I renewed 5 more days. Symptoms did not improve so his PCP ordered different abx (clindamycin) which helped with swelling. Pain reports significant reduction of the symptoms. MRI pending r/o underlying abscess.       Review of Systems   Constitution: Negative for chills, decreased appetite, fever and malaise/fatigue.   HENT: Negative for congestion, ear discharge and sore throat.    Eyes: Negative for discharge and pain.   Cardiovascular: Negative for chest pain, claudication and leg swelling.   Respiratory: Negative for cough and shortness of breath.    Skin: Negative for color change, nail changes and rash.   Musculoskeletal: Negative for arthritis, joint pain, joint swelling and muscle weakness.        L heel pain   Gastrointestinal: Negative for bloating, abdominal pain, diarrhea, nausea and vomiting.   Genitourinary: Negative for flank pain and hematuria.   Neurological: Negative for headaches, numbness and weakness.   Psychiatric/Behavioral: Negative for altered mental  status.             Past Medical History:   Diagnosis Date    Chronic seasonal allergic rhinitis 2017    GERD (gastroesophageal reflux disease)     TB (pulmonary tuberculosis) 1982    Treated [facility] 9 mo therapy.       History reviewed. No pertinent surgical history.    Family History   Problem Relation Age of Onset    Breast cancer Mother     Prostate cancer Father     Emphysema Neg Hx        Social History     Socioeconomic History    Marital status:      Spouse name: Not on file    Number of children: Not on file    Years of education: Not on file    Highest education level: Not on file   Occupational History    Not on file   Social Needs    Financial resource strain: Not on file    Food insecurity:     Worry: Not on file     Inability: Not on file    Transportation needs:     Medical: Not on file     Non-medical: Not on file   Tobacco Use    Smoking status: Former Smoker     Packs/day: 1.50     Years: 2.00     Pack years: 3.00     Types: Cigarettes     Last attempt to quit: 4/3/1977     Years since quittin.7    Smokeless tobacco: Never Used   Substance and Sexual Activity    Alcohol use: Yes     Alcohol/week: 3.0 standard drinks     Types: 3 Cans of beer per week     Frequency: 2-3 times a week     Comment: one drink/week    Drug use: No    Sexual activity: Yes     Partners: Female   Lifestyle    Physical activity:     Days per week: Not on file     Minutes per session: Not on file    Stress: Not on file   Relationships    Social connections:     Talks on phone: Not on file     Gets together: Not on file     Attends Gnosticist service: Not on file     Active member of club or organization: Not on file     Attends meetings of clubs or organizations: Not on file     Relationship status: Not on file   Other Topics Concern    Not on file   Social History Narrative    Not on file       Current Outpatient Medications   Medication Sig Dispense Refill    clindamycin (CLEOCIN) 300  "MG capsule Take 1 capsule (300 mg total) by mouth 4 (four) times daily. 28 capsule 0    tadalafil (CIALIS) 20 MG Tab Take 1 tablet (20 mg total) by mouth every other day. Take every other day as needed 8 tablet 11    methylPREDNISolone (MEDROL DOSEPACK) 4 mg tablet use as directed (Patient not taking: Reported on 12/5/2019) 1 Package 0    naproxen (NAPROSYN) 500 MG tablet Take 1 tablet (500 mg total) by mouth 2 (two) times daily. (Patient not taking: Reported on 12/12/2019) 30 tablet 1     No current facility-administered medications for this visit.        Review of patient's allergies indicates:  No Known Allergies    Vitals:    12/12/19 0918   BP: 113/74   Pulse: 75   Weight: 79.2 kg (174 lb 9.6 oz)   Height: 5' 7" (1.702 m)   PainSc:   6   PainLoc: Foot       Objective:      Physical Exam   Constitutional: He is oriented to person, place, and time. He appears well-developed and well-nourished. No distress.   HENT:   Nose: Nose normal.   Eyes: Conjunctivae are normal.   Neck: Normal range of motion.   Pulmonary/Chest: Effort normal. He exhibits no tenderness.   Abdominal: There is no tenderness.   Neurological: He is alert and oriented to person, place, and time.   Psychiatric: He has a normal mood and affect. His behavior is normal.       Vascular: Distal DP/PT pulses palpable 2/4. CRT < 3 sec to tips of toes. No vericosities noted to LEs. Hair growth present LE, warm to touch LE  L foot: mild swelling medial and lateral heel.     Dermatologic: No open lesions, lacerations or wounds. Interdigital spaces clean, dry and intact.   L foot: mild rubor and mild erythema lateral and medial heel. No obvious fluctuance, no crepitus, no open wounds.     Musculoskeletal: MMT 5/5 in DF/PF/Inv/Ev resistance with no reproduction of pain in any direction. Passive range of motion of ankle and pedal joints is painless. No calf tenderness LE, Compartments soft/compressible.   L foot: mild tenderness of medial and lateral heel " and diffuse pain back of the ankle.     Neurological: Light touch, proprioception, and sharp/dull sensation are all intact. Protective threshold with the Gower-Wienstein monofilament is intact. Vibratory sensation intact.     12/5/19          Assessment:       Encounter Diagnosis   Name Primary?    Cellulitis of left lower extremity Yes         Plan:       Alessandro was seen today for follow-up.    Diagnoses and all orders for this visit:    Cellulitis of left lower extremity      I counseled the patient on his conditions, their implications and medical management.    64 y.o. male L plantar fasciitis status post steroid injection with cellulitis.     -recommend finishing for course of clindamycin (2 more days left).  Patient's symptoms significantly improved.  MRI is scheduled for today.  will follow-up on MRI since there are still residual erythema, rubor and pain.  -The nature of the condition, options for management, as well as potential risks and complications were discussed in detail with patient. Patient was amenable to my recommendations and left my office fully informed and will follow up as instructed or sooner if necessary.    -The nature of the condition, options for management, as well as potential risks and complications were discussed in detail with patient. Patient was amenable to my recommendations and left my office fully informed and will follow up as instructed or sooner if necessary.    -Patient was advised of signs and symptoms of infection including redness, drainage, purulence, odor, streaking, fever, chills and I advised patient to seek medical attention (ER or urgent care) if these symptoms arise.   -f/u 2-3 weeks     Note dictated with voice recognition software, please excuse any grammatical errors.

## 2019-12-18 ENCOUNTER — OFFICE VISIT (OUTPATIENT)
Dept: FAMILY MEDICINE | Facility: CLINIC | Age: 64
End: 2019-12-18
Payer: COMMERCIAL

## 2019-12-18 VITALS
WEIGHT: 168.44 LBS | TEMPERATURE: 99 F | OXYGEN SATURATION: 98 % | SYSTOLIC BLOOD PRESSURE: 116 MMHG | HEART RATE: 80 BPM | BODY MASS INDEX: 26.44 KG/M2 | DIASTOLIC BLOOD PRESSURE: 68 MMHG | HEIGHT: 67 IN

## 2019-12-18 DIAGNOSIS — R22.42 LOCALIZED SWELLING OF LEFT FOOT: ICD-10-CM

## 2019-12-18 DIAGNOSIS — R60.9 EDEMA, UNSPECIFIED TYPE: ICD-10-CM

## 2019-12-18 DIAGNOSIS — M72.2 PLANTAR FASCIITIS: Primary | ICD-10-CM

## 2019-12-18 PROCEDURE — 99214 OFFICE O/P EST MOD 30 MIN: CPT | Mod: S$GLB,,, | Performed by: FAMILY MEDICINE

## 2019-12-18 PROCEDURE — 99999 PR PBB SHADOW E&M-EST. PATIENT-LVL IV: ICD-10-PCS | Mod: PBBFAC,,, | Performed by: FAMILY MEDICINE

## 2019-12-18 PROCEDURE — 99999 PR PBB SHADOW E&M-EST. PATIENT-LVL IV: CPT | Mod: PBBFAC,,, | Performed by: FAMILY MEDICINE

## 2019-12-18 PROCEDURE — 99214 PR OFFICE/OUTPT VISIT, EST, LEVL IV, 30-39 MIN: ICD-10-PCS | Mod: S$GLB,,, | Performed by: FAMILY MEDICINE

## 2019-12-18 RX ORDER — DICLOFENAC SODIUM 10 MG/G
2 GEL TOPICAL 4 TIMES DAILY
Qty: 100 G | Refills: 0 | Status: SHIPPED | OUTPATIENT
Start: 2019-12-18 | End: 2020-03-04

## 2019-12-18 RX ORDER — IBUPROFEN 600 MG/1
600 TABLET ORAL
Qty: 30 TABLET | Refills: 0 | Status: ON HOLD | OUTPATIENT
Start: 2019-12-18 | End: 2019-12-31

## 2019-12-18 NOTE — PROGRESS NOTES
Subjective:       Patient ID: Alessandro Cerna is a 64 y.o. male.    Chief Complaint: Foot Pain (left )    Alessandro is a 64 y.o. male who presents today with foot pain. He has seen multiple providers and a podiatrist for this. He has been alternatively diagnosed with celllitis and plantar fascia issues. He saw  Podiatry, Dr. Shah, and Podiatry again.     His foot problems started 8 months ago. He saw a doctor in November for it. Pain started when he woke up. The pain has been in the left foot. He currently has no pain, just swelling. No fevers. No other joints that appear to be affected except for the back of the left knee. No elbow pain. No wrist pain. Swelling becomes uncomfortable after he works for a long period of time. Swelling is only in the bottom of the left foot.     MRI by podiatry showed: Thickening and edema at the origin of the plantar fascia.  There is greater edema at the lateral plantar  fascia and underlying abductor digiti minimi muscle.  Reactive edema the inferior calcaneus.  No definite fluid collection to suggest intraosseous or soft tissue abscess.    He missed his last apt for a physical. Hasn't had physical since 2017.     Review of Systems   Constitutional: Negative for fever.   Respiratory: Negative for cough and shortness of breath.    Cardiovascular: Negative for chest pain.   Gastrointestinal: Negative for blood in stool, diarrhea, nausea and vomiting.   Genitourinary: Negative for difficulty urinating and dysuria.   Musculoskeletal: Positive for gait problem and joint swelling.   Skin: Positive for rash.   Neurological: Negative for dizziness, light-headedness and numbness.           Objective:     Vitals:    12/18/19 1554   BP: 116/68   Pulse: 80   Temp: 98.5 °F (36.9 °C)        Physical Exam   Constitutional: He is oriented to person, place, and time. He appears well-developed and well-nourished. No distress.   HENT:   Head: Normocephalic and atraumatic.   Eyes: Conjunctivae  are normal.   Cardiovascular: Normal rate and regular rhythm.   Pulmonary/Chest: Effort normal and breath sounds normal.   Musculoskeletal: He exhibits edema and deformity. He exhibits no tenderness.   Swollen heal of left foot  Pulses normal  No calf pain  No edema in the calf or the shin  No visible drainage or palpable abscess or discharge    Other joints (hip, knee, wrist, elbow, shoulder) appear normal.     See pictures below   Neurological: He is alert and oriented to person, place, and time.   Skin: Skin is warm. He is not diaphoretic.   Psychiatric: He has a normal mood and affect. His behavior is normal. Judgment and thought content normal.   Nursing note and vitals reviewed.                      Assessment:       1. Plantar fasciitis    2. Edema, unspecified type    3. Localized swelling of left foot        Plan:       Doubt infectious component  Will check labs for inflammation/infection/autoimmune component but doubt that this is contributing to symptoms  If negative, would not treat further with abx  MRI shows inflammation and edema of the plantar fascia. May need surgery? Defer to podiatry.   Would recommend icing 2-3 times daily  Would recommend frozen ice water bottle twice daily  voltaren gel  Ibuprofen TID WM x 10 days  Follow up with podiatry as scheduled  Continue compression      Plantar fasciitis  -     diclofenac sodium (VOLTAREN) 1 % Gel; Apply 2 g topically 4 (four) times daily.  Dispense: 100 g; Refill: 0  -     ibuprofen (ADVIL,MOTRIN) 600 MG tablet; Take 1 tablet (600 mg total) by mouth 3 (three) times daily with meals. for 10 days  Dispense: 30 tablet; Refill: 0  -     URIC ACID; Future; Expected date: 12/18/2019    Edema, unspecified type  -     diclofenac sodium (VOLTAREN) 1 % Gel; Apply 2 g topically 4 (four) times daily.  Dispense: 100 g; Refill: 0  -     ibuprofen (ADVIL,MOTRIN) 600 MG tablet; Take 1 tablet (600 mg total) by mouth 3 (three) times daily with meals. for 10 days   Dispense: 30 tablet; Refill: 0  -     URIC ACID; Future; Expected date: 12/18/2019    Localized swelling of left foot  -     Sedimentation rate; Future; Expected date: 12/18/2019  -     C-reactive protein; Future; Expected date: 12/18/2019  -     Procalcitonin; Future; Expected date: 12/18/2019  -     STACI Screen w/Reflex; Future; Expected date: 12/18/2019  -     diclofenac sodium (VOLTAREN) 1 % Gel; Apply 2 g topically 4 (four) times daily.  Dispense: 100 g; Refill: 0  -     ibuprofen (ADVIL,MOTRIN) 600 MG tablet; Take 1 tablet (600 mg total) by mouth 3 (three) times daily with meals. for 10 days  Dispense: 30 tablet; Refill: 0  -     URIC ACID; Future; Expected date: 12/18/2019      Warning signs discussed, patient to call with any further issues or worsening of symptoms.

## 2019-12-18 NOTE — PATIENT INSTRUCTIONS
Doubt infectious component  Will check labs for inflammation/infection  If negative, would not treat further with abx  MRI shows inflammation and edema of the plantar fascia  Would recommend icing 2-3 times daily  Would recommend frozen ice water bottle twice daily  voltaren gel  Ibuprofen TID WM x 10 days  Follow up with podiatry as scheduled  Continue compression

## 2019-12-23 ENCOUNTER — LAB VISIT (OUTPATIENT)
Dept: LAB | Facility: HOSPITAL | Age: 64
End: 2019-12-23
Attending: FAMILY MEDICINE
Payer: COMMERCIAL

## 2019-12-23 DIAGNOSIS — M72.2 PLANTAR FASCIITIS: ICD-10-CM

## 2019-12-23 DIAGNOSIS — R22.42 LOCALIZED SWELLING OF LEFT FOOT: ICD-10-CM

## 2019-12-23 DIAGNOSIS — R60.9 EDEMA, UNSPECIFIED TYPE: ICD-10-CM

## 2019-12-23 LAB
CRP SERPL-MCNC: 39.1 MG/L (ref 0–8.2)
ERYTHROCYTE [SEDIMENTATION RATE] IN BLOOD BY WESTERGREN METHOD: 9 MM/HR (ref 0–23)
PROCALCITONIN SERPL IA-MCNC: 0.03 NG/ML
URATE SERPL-MCNC: 4 MG/DL (ref 3.4–7)

## 2019-12-23 PROCEDURE — 84550 ASSAY OF BLOOD/URIC ACID: CPT

## 2019-12-23 PROCEDURE — 86140 C-REACTIVE PROTEIN: CPT

## 2019-12-23 PROCEDURE — 84145 PROCALCITONIN (PCT): CPT

## 2019-12-23 PROCEDURE — 36415 COLL VENOUS BLD VENIPUNCTURE: CPT | Mod: PO

## 2019-12-23 PROCEDURE — 85652 RBC SED RATE AUTOMATED: CPT

## 2019-12-23 PROCEDURE — 86038 ANTINUCLEAR ANTIBODIES: CPT

## 2019-12-24 ENCOUNTER — OFFICE VISIT (OUTPATIENT)
Dept: URGENT CARE | Facility: CLINIC | Age: 64
End: 2019-12-24
Payer: COMMERCIAL

## 2019-12-24 VITALS
RESPIRATION RATE: 16 BRPM | SYSTOLIC BLOOD PRESSURE: 130 MMHG | WEIGHT: 168 LBS | HEIGHT: 67 IN | TEMPERATURE: 99 F | BODY MASS INDEX: 26.37 KG/M2 | HEART RATE: 84 BPM | DIASTOLIC BLOOD PRESSURE: 82 MMHG | OXYGEN SATURATION: 100 %

## 2019-12-24 DIAGNOSIS — M79.672 FOOT PAIN, LEFT: ICD-10-CM

## 2019-12-24 DIAGNOSIS — L03.90 CELLULITIS, UNSPECIFIED CELLULITIS SITE: Primary | ICD-10-CM

## 2019-12-24 LAB — ANA SER QL IF: NORMAL

## 2019-12-24 PROCEDURE — 99214 PR OFFICE/OUTPT VISIT, EST, LEVL IV, 30-39 MIN: ICD-10-PCS | Mod: S$GLB,,, | Performed by: FAMILY MEDICINE

## 2019-12-24 PROCEDURE — 99214 OFFICE O/P EST MOD 30 MIN: CPT | Mod: S$GLB,,, | Performed by: FAMILY MEDICINE

## 2019-12-24 RX ORDER — CEPHALEXIN 500 MG/1
500 CAPSULE ORAL EVERY 8 HOURS
Qty: 30 CAPSULE | Refills: 0 | Status: ON HOLD | OUTPATIENT
Start: 2019-12-24 | End: 2019-12-31 | Stop reason: HOSPADM

## 2019-12-24 NOTE — PATIENT INSTRUCTIONS
PLEASE READ YOUR DISCHARGE INSTRUCTIONS ENTIRELY AS IT CONTAINS IMPORTANT INFORMATION.    Please return here or go to the Emergency Department for any concerns or worsening of condition.    If you were prescribed antibiotics, please take them to completion.    If you were prescribed a narcotic medication, do not drive or operate heavy equipment or machinery while taking these medications.    Please return here in 1-3 days for a recheck of your wound.    If not allergic, please take over the counter Tylenol (Acetaminophen) and/or Motrin (Ibuprofen) as directed for control of pain and/or fever.  Please follow up with your primary care doctor or specialist as needed.  Soak wound as discussed and apply warm compresses frequently     If this is a recurrent issue, use hibiclens three times a week as body wash to help prevent future abscess(es), let stay on skin for 5 minutes before rinsing.off.  If placed on antibiotics, complete them.  The abscess may drain for a day or two, keep covered while out.     If you smoke, please stop smoking.    Please return or see your primary care doctor if you develop new or worsening symptoms.     Please arrange follow up with your primary medical clinic as soon as possible. You must understand that you've received an Urgent Care treatment only and that you may be released before all of your medical problems are known or treated. You, the patient, will arrange for follow up as instructed. If your symptoms worsen or fail to improve you should go to the Emergency Room.  Discharge Instructions for Cellulitis  You have been diagnosed with cellulitis. This is an infection in the deepest layer of the skin. In some cases, the infection also affects the muscle. Cellulitis is caused by bacteria. The bacteria can enter the body through broken skin. This can happen with a cut, scratch, animal bite, or an insect bite that has been scratched. You may have been treated in the hospital with antibiotics  and fluids. You will likely be given a prescription for antibiotics to take at home. This sheet will help you take care of yourself at home.  Home care  When you are home:  · Take the prescribed antibiotic medicine you are given as directed until it is gone. Take it even if you feel better. It treats the infection and stops it from returning. Not taking all the medicine can make future infections hard to treat.  · Keep the infected area clean.  · When possible, raise the infected area above the level of your heart. This helps keep swelling down.  · Talk with your healthcare provider if you are in pain. Ask what kind of over-the-counter medicine you can take for pain.  · Apply clean bandages as advised.  · Take your temperature once a day for a week.  · Wash your hands often to prevent spreading the infection.  In the future, wash your hands before and after you touch cuts, scratches, or bandages. This will help prevent infection.   When to call your healthcare provider  Call your healthcare provider immediately if you have any of the following:  · Difficulty or pain when moving the joints above or below the infected area  · Discharge or pus draining from the area  · Fever of 100.4°F (38°C) or higher, or as directed by your healthcare provider  · Pain that gets worse in or around the infected   · Redness that gets worse in or around the infected area, particularly if the area of redness expands to a wider area  · Shaking chills  · Swelling of the infected area  · Vomiting   Date Last Reviewed: 8/1/2016  © 5596-8773 [employer]. [address] [place] [address]. All rights reserved. This information is not intended as a substitute for professional medical care. Always follow your healthcare professional's instructions.        Discharge Instructions for Cellulitis  You have been diagnosed with cellulitis. This is an infection in the deepest layer of the skin. In some cases, the infection also  affects the muscle. Cellulitis is caused by bacteria. The bacteria can enter the body through broken skin. This can happen with a cut, scratch, animal bite, or an insect bite that has been scratched. You may have been treated in the hospital with antibiotics and fluids. You will likely be given a prescription for antibiotics to take at home. This sheet will help you take care of yourself at home.  Home care  When you are home:  · Take the prescribed antibiotic medicine you are given as directed until it is gone. Take it even if you feel better. It treats the infection and stops it from returning. Not taking all the medicine can make future infections hard to treat.  · Keep the infected area clean.  · When possible, raise the infected area above the level of your heart. This helps keep swelling down.  · Talk with your healthcare provider if you are in pain. Ask what kind of over-the-counter medicine you can take for pain.  · Apply clean bandages as advised.  · Take your temperature once a day for a week.  · Wash your hands often to prevent spreading the infection.  In the future, wash your hands before and after you touch cuts, scratches, or bandages. This will help prevent infection.   When to call your healthcare provider  Call your healthcare provider immediately if you have any of the following:  · Difficulty or pain when moving the joints above or below the infected area  · Discharge or pus draining from the area  · Fever of 100.4°F (38°C) or higher, or as directed by your healthcare provider  · Pain that gets worse in or around the infected   · Redness that gets worse in or around the infected area, particularly if the area of redness expands to a wider area  · Shaking chills  · Swelling of the infected area  · Vomiting   Date Last Reviewed: 8/1/2016  © 4717-5282 [employer]. [address] [place] [address], [place] [address]. All rights reserved. This information is not intended as a substitute for  professional medical care. Always follow your healthcare professional's instructions.

## 2019-12-24 NOTE — PROGRESS NOTES
"Subjective:       Patient ID: Alessandro Cerna is a 64 y.o. male.    Vitals:  height is 5' 7" (1.702 m) and weight is 76.2 kg (168 lb). His temperature is 99 °F (37.2 °C). His blood pressure is 130/82 and his pulse is 84. His respiration is 16 and oxygen saturation is 100%.     Chief Complaint: Heel Pain    64 years old male came with a complaint of recurrent left foot pain for last 6 months.  Patient has been under care of podiatrist, but is not 100% satisfied with the care.  Patient states the pain and swelling suddenly got worse a month ago after he received an injection in the foot by the foot doctor and since then it has been coming back off and on.  Recent MRI shows inflammation and edema around the plantar fascia with no evidence of abscess.  Patient finished his last course of antibiotics about 10 days ago.  Reports some relief with antibiotics but after he stopped the antibiotics the swelling has increased.  Denies fever or chills, although temperature is slightly up at 99 today.  Denies weakness/dizziness.  Patient states the pain is only on touch and putting pressure while walking on that foot.  Patient was recently seen by primary care doctor about 5 days ago who prescribed anti-inflammatories.  Patient reports no improvements with anti-inflammatories.    Foot Injury    The incident occurred more than 1 week ago (11/13/19). The pain is at a severity of 9/10. Treatments tried: ibuprofen. The treatment provided no relief.       Constitution: Negative for chills, fatigue and fever.   HENT: Negative for congestion and sore throat.    Neck: Negative for painful lymph nodes.   Cardiovascular: Negative for chest pain and leg swelling.   Eyes: Negative for double vision and blurred vision.   Respiratory: Negative for cough and shortness of breath.    Gastrointestinal: Negative for nausea, vomiting and diarrhea.   Genitourinary: Negative for dysuria, frequency and urgency.   Musculoskeletal: Negative for " joint pain, joint swelling, muscle cramps and muscle ache.   Skin: Negative for color change, pale and rash.   Allergic/Immunologic: Negative for seasonal allergies.   Neurological: Negative for dizziness, history of vertigo, light-headedness, passing out and headaches.   Hematologic/Lymphatic: Negative for swollen lymph nodes, easy bruising/bleeding and history of blood clots. Does not bruise/bleed easily.   Psychiatric/Behavioral: Negative for nervous/anxious, sleep disturbance and depression. The patient is not nervous/anxious.        Objective:      Physical Exam   Constitutional: He is oriented to person, place, and time. He appears well-developed and well-nourished. He is cooperative.  Non-toxic appearance. He does not appear ill. No distress.   HENT:   Head: Normocephalic and atraumatic.   Right Ear: Hearing, tympanic membrane, external ear and ear canal normal.   Left Ear: Hearing, tympanic membrane, external ear and ear canal normal.   Nose: Nose normal. No mucosal edema, rhinorrhea or nasal deformity. No epistaxis. Right sinus exhibits no maxillary sinus tenderness and no frontal sinus tenderness. Left sinus exhibits no maxillary sinus tenderness and no frontal sinus tenderness.   Mouth/Throat: Uvula is midline, oropharynx is clear and moist and mucous membranes are normal. No trismus in the jaw. Normal dentition. No uvula swelling. No posterior oropharyngeal erythema.   Eyes: Conjunctivae and lids are normal. Right eye exhibits no discharge. Left eye exhibits no discharge. No scleral icterus.   Neck: Trachea normal, normal range of motion, full passive range of motion without pain and phonation normal. Neck supple.   Cardiovascular: Normal rate, regular rhythm, normal heart sounds, intact distal pulses and normal pulses.   Pulmonary/Chest: Effort normal and breath sounds normal. No respiratory distress.   Abdominal: Soft. Normal appearance and bowel sounds are normal. He exhibits no distension, no  pulsatile midline mass and no mass. There is no tenderness.   Musculoskeletal: Normal range of motion. He exhibits tenderness. He exhibits no deformity.   Left foot:  Positive moderate swelling around the heel both sides and plantar aspect of heel.  Positive mild warmth.  Positive localized tenderness. Normal ROM.  Pedal pulses 2+.  No cough tenderness. No lymph node enlargement.   Neurological: He is alert and oriented to person, place, and time. He exhibits normal muscle tone. Coordination normal.   Skin: Skin is warm, dry, intact, not diaphoretic and not pale.   Psychiatric: He has a normal mood and affect. His speech is normal and behavior is normal. Judgment and thought content normal. Cognition and memory are normal.   Nursing note and vitals reviewed.        Assessment:       1. Cellulitis, unspecified cellulitis site    2. Foot pain, left        Plan:         Cellulitis, unspecified cellulitis site    Foot pain, left  -     Ambulatory referral to Podiatry    Other orders  -     cephALEXin (KEFLEX) 500 MG capsule; Take 1 capsule (500 mg total) by mouth every 8 (eight) hours. for 10 days  Dispense: 30 capsule; Refill: 0        ASE READ YOUR DISCHARGE INSTRUCTIONS ENTIRELY AS IT CONTAINS IMPORTANT INFORMATION.    Please return here or go to the Emergency Department for any concerns or worsening of condition.    If you were prescribed antibiotics, please take them to completion.    If you were prescribed a narcotic medication, do not drive or operate heavy equipment or machinery while taking these medications.    Please return here in 1-3 days for a recheck of your wound.    If not allergic, please take over the counter Tylenol (Acetaminophen) and/or Motrin (Ibuprofen) as directed for control of pain and/or fever.  Please follow up with your primary care doctor or specialist as needed.  Soak wound as discussed and apply warm compresses frequently     If this is a recurrent issue, use hibiclens three times a week  as body wash to help prevent future abscess(es), let stay on skin for 5 minutes before rinsing.off.  If placed on antibiotics, complete them.  The abscess may drain for a day or two, keep covered while out.     If you smoke, please stop smoking.    Please return or see your primary care doctor if you develop new or worsening symptoms.     Please arrange follow up with your primary medical clinic as soon as possible. You must understand that you've received an Urgent Care treatment only and that you may be released before all of your medical problems are known or treated. You, the patient, will arrange for follow up as instructed. If your symptoms worsen or fail to improve you should go to the Emergency Room.  Discharge Instructions for Cellulitis  You have been diagnosed with cellulitis. This is an infection in the deepest layer of the skin. In some cases, the infection also affects the muscle. Cellulitis is caused by bacteria. The bacteria can enter the body through broken skin. This can happen with a cut, scratch, animal bite, or an insect bite that has been scratched. You may have been treated in the hospital with antibiotics and fluids. You will likely be given a prescription for antibiotics to take at home. This sheet will help you take care of yourself at home.  Home care  When you are home:  · Take the prescribed antibiotic medicine you are given as directed until it is gone. Take it even if you feel better. It treats the infection and stops it from returning. Not taking all the medicine can make future infections hard to treat.  · Keep the infected area clean.  · When possible, raise the infected area above the level of your heart. This helps keep swelling down.  · Talk with your healthcare provider if you are in pain. Ask what kind of over-the-counter medicine you can take for pain.  · Apply clean bandages as advised.  · Take your temperature once a day for a week.  · Wash your hands often to prevent  spreading the infection.  In the future, wash your hands before and after you touch cuts, scratches, or bandages. This will help prevent infection.   When to call your healthcare provider  Call your healthcare provider immediately if you have any of the following:  · Difficulty or pain when moving the joints above or below the infected area  · Discharge or pus draining from the area  · Fever of 100.4°F (38°C) or higher, or as directed by your healthcare provider  · Pain that gets worse in or around the infected   · Redness that gets worse in or around the infected area, particularly if the area of redness expands to a wider area  · Shaking chills  · Swelling of the infected area  · Vomiting   Date Last Reviewed: 8/1/2016  © 3794-1976 [employer]. [address] [place] [address], [place] [address]. All rights reserved. This information is not intended as a substitute for professional medical care. Always follow your healthcare professional's instructions.        Discharge Instructions for Cellulitis  You have been diagnosed with cellulitis. This is an infection in the deepest layer of the skin. In some cases, the infection also affects the muscle. Cellulitis is caused by bacteria. The bacteria can enter the body through broken skin. This can happen with a cut, scratch, animal bite, or an insect bite that has been scratched. You may have been treated in the hospital with antibiotics and fluids. You will likely be given a prescription for antibiotics to take at home. This sheet will help you take care of yourself at home.  Home care  When you are home:  · Take the prescribed antibiotic medicine you are given as directed until it is gone. Take it even if you feel better. It treats the infection and stops it from returning. Not taking all the medicine can make future infections hard to treat.  · Keep the infected area clean.  · When possible, raise the infected area above the level of your heart. This helps keep  swelling down.  · Talk with your healthcare provider if you are in pain. Ask what kind of over-the-counter medicine you can take for pain.  · Apply clean bandages as advised.  · Take your temperature once a day for a week.  · Wash your hands often to prevent spreading the infection.  In the future, wash your hands before and after you touch cuts, scratches, or bandages. This will help prevent infection.   When to call your healthcare provider  Call your healthcare provider immediately if you have any of the following:  · Difficulty or pain when moving the joints above or below the infected area  · Discharge or pus draining from the area  · Fever of 100.4°F (38°C) or higher, or as directed by your healthcare provider  · Pain that gets worse in or around the infected   · Redness that gets worse in or around the infected area, particularly if the area of redness expands to a wider area  · Shaking chills  · Swelling of the infected area  · Vomiting   Date Last Reviewed: 8/1/2016  © 3084-6937 [employer]. [address] [place] [address], [place] [address]. All rights reserved. This information is not intended as a substitute for professional medical care. Always follow your healthcare professional's instructions.      Follow up with PCP within next 2-5 days.  I have put a new referral to foot doctor.  You will receive a phone call from foot doctor's office in next few days.  If symptoms get worse, go to ER for further evaluation.

## 2019-12-26 ENCOUNTER — HOSPITAL ENCOUNTER (INPATIENT)
Facility: HOSPITAL | Age: 64
LOS: 2 days | Discharge: HOME | DRG: 581 | End: 2019-12-31
Attending: EMERGENCY MEDICINE | Admitting: HOSPITALIST
Payer: COMMERCIAL

## 2019-12-26 DIAGNOSIS — L03.116 CELLULITIS OF LEFT FOOT: ICD-10-CM

## 2019-12-26 DIAGNOSIS — R60.9 EDEMA, UNSPECIFIED TYPE: ICD-10-CM

## 2019-12-26 DIAGNOSIS — M72.2 PLANTAR FASCIITIS: ICD-10-CM

## 2019-12-26 DIAGNOSIS — L08.9 SOFT TISSUE INFECTION: Primary | ICD-10-CM

## 2019-12-26 DIAGNOSIS — R22.42 LOCALIZED SWELLING OF LEFT FOOT: ICD-10-CM

## 2019-12-26 DIAGNOSIS — M79.672 LEFT FOOT PAIN: ICD-10-CM

## 2019-12-26 PROBLEM — R60.0 EDEMA OF LEFT FOOT: Status: ACTIVE | Noted: 2019-12-26

## 2019-12-26 PROBLEM — J30.2 CHRONIC SEASONAL ALLERGIC RHINITIS: Chronic | Status: ACTIVE | Noted: 2017-11-16

## 2019-12-26 PROBLEM — K21.9 GERD (GASTROESOPHAGEAL REFLUX DISEASE): Status: ACTIVE | Noted: 2019-12-26

## 2019-12-26 LAB
ALBUMIN SERPL BCP-MCNC: 4.1 G/DL (ref 3.5–5.2)
ALP SERPL-CCNC: 86 U/L (ref 55–135)
ALT SERPL W/O P-5'-P-CCNC: 13 U/L (ref 10–44)
ANION GAP SERPL CALC-SCNC: 13 MMOL/L (ref 8–16)
AST SERPL-CCNC: 15 U/L (ref 10–40)
BASOPHILS # BLD AUTO: 0.01 K/UL (ref 0–0.2)
BASOPHILS NFR BLD: 0.1 % (ref 0–1.9)
BILIRUB SERPL-MCNC: 1.6 MG/DL (ref 0.1–1)
BUN SERPL-MCNC: 9 MG/DL (ref 8–23)
CALCIUM SERPL-MCNC: 9.5 MG/DL (ref 8.7–10.5)
CHLORIDE SERPL-SCNC: 102 MMOL/L (ref 95–110)
CO2 SERPL-SCNC: 23 MMOL/L (ref 23–29)
CREAT SERPL-MCNC: 1 MG/DL (ref 0.5–1.4)
DIFFERENTIAL METHOD: ABNORMAL
EOSINOPHIL # BLD AUTO: 0 K/UL (ref 0–0.5)
EOSINOPHIL NFR BLD: 0.4 % (ref 0–8)
ERYTHROCYTE [DISTWIDTH] IN BLOOD BY AUTOMATED COUNT: 13.1 % (ref 11.5–14.5)
EST. GFR  (AFRICAN AMERICAN): >60 ML/MIN/1.73 M^2
EST. GFR  (NON AFRICAN AMERICAN): >60 ML/MIN/1.73 M^2
GLUCOSE SERPL-MCNC: 136 MG/DL (ref 70–110)
HCT VFR BLD AUTO: 44.4 % (ref 40–54)
HGB BLD-MCNC: 15 G/DL (ref 14–18)
LACTATE SERPL-SCNC: 1.9 MMOL/L (ref 0.5–2.2)
LYMPHOCYTES # BLD AUTO: 2 K/UL (ref 1–4.8)
LYMPHOCYTES NFR BLD: 27.4 % (ref 18–48)
MCH RBC QN AUTO: 30.9 PG (ref 27–31)
MCHC RBC AUTO-ENTMCNC: 33.8 G/DL (ref 32–36)
MCV RBC AUTO: 91 FL (ref 82–98)
MONOCYTES # BLD AUTO: 0.6 K/UL (ref 0.3–1)
MONOCYTES NFR BLD: 8.6 % (ref 4–15)
NEUTROPHILS # BLD AUTO: 4.6 K/UL (ref 1.8–7.7)
NEUTROPHILS NFR BLD: 63.5 % (ref 38–73)
PLATELET # BLD AUTO: 261 K/UL (ref 150–350)
PMV BLD AUTO: 9.1 FL (ref 9.2–12.9)
POTASSIUM SERPL-SCNC: 3.6 MMOL/L (ref 3.5–5.1)
PROT SERPL-MCNC: 8.6 G/DL (ref 6–8.4)
RBC # BLD AUTO: 4.86 M/UL (ref 4.6–6.2)
SODIUM SERPL-SCNC: 138 MMOL/L (ref 136–145)
WBC # BLD AUTO: 7.22 K/UL (ref 3.9–12.7)

## 2019-12-26 PROCEDURE — G0378 HOSPITAL OBSERVATION PER HR: HCPCS

## 2019-12-26 PROCEDURE — 85025 COMPLETE CBC W/AUTO DIFF WBC: CPT

## 2019-12-26 PROCEDURE — 80053 COMPREHEN METABOLIC PANEL: CPT

## 2019-12-26 PROCEDURE — 96365 THER/PROPH/DIAG IV INF INIT: CPT

## 2019-12-26 PROCEDURE — 96375 TX/PRO/DX INJ NEW DRUG ADDON: CPT

## 2019-12-26 PROCEDURE — 96366 THER/PROPH/DIAG IV INF ADDON: CPT

## 2019-12-26 PROCEDURE — 96372 THER/PROPH/DIAG INJ SC/IM: CPT

## 2019-12-26 PROCEDURE — 63600175 PHARM REV CODE 636 W HCPCS: Performed by: EMERGENCY MEDICINE

## 2019-12-26 PROCEDURE — 83605 ASSAY OF LACTIC ACID: CPT

## 2019-12-26 PROCEDURE — 25000003 PHARM REV CODE 250: Performed by: HOSPITALIST

## 2019-12-26 PROCEDURE — 63600175 PHARM REV CODE 636 W HCPCS: Performed by: HOSPITALIST

## 2019-12-26 PROCEDURE — 87040 BLOOD CULTURE FOR BACTERIA: CPT | Mod: 59

## 2019-12-26 PROCEDURE — 99285 EMERGENCY DEPT VISIT HI MDM: CPT | Mod: 25

## 2019-12-26 RX ORDER — TALC
6 POWDER (GRAM) TOPICAL NIGHTLY PRN
Status: DISCONTINUED | OUTPATIENT
Start: 2019-12-26 | End: 2019-12-31 | Stop reason: HOSPADM

## 2019-12-26 RX ORDER — ACETAMINOPHEN 650 MG/1
650 SUPPOSITORY RECTAL EVERY 4 HOURS PRN
Status: DISCONTINUED | OUTPATIENT
Start: 2019-12-26 | End: 2019-12-31 | Stop reason: HOSPADM

## 2019-12-26 RX ORDER — ONDANSETRON 2 MG/ML
4 INJECTION INTRAMUSCULAR; INTRAVENOUS EVERY 8 HOURS PRN
Status: DISCONTINUED | OUTPATIENT
Start: 2019-12-26 | End: 2019-12-31 | Stop reason: HOSPADM

## 2019-12-26 RX ORDER — ACETAMINOPHEN 325 MG/1
650 TABLET ORAL EVERY 8 HOURS PRN
Status: DISCONTINUED | OUTPATIENT
Start: 2019-12-26 | End: 2019-12-31 | Stop reason: HOSPADM

## 2019-12-26 RX ORDER — VANCOMYCIN HCL IN 5 % DEXTROSE 1G/250ML
1000 PLASTIC BAG, INJECTION (ML) INTRAVENOUS
Status: DISCONTINUED | OUTPATIENT
Start: 2019-12-26 | End: 2019-12-31 | Stop reason: HOSPADM

## 2019-12-26 RX ORDER — SODIUM CHLORIDE 0.9 % (FLUSH) 0.9 %
10 SYRINGE (ML) INJECTION
Status: DISCONTINUED | OUTPATIENT
Start: 2019-12-26 | End: 2019-12-31 | Stop reason: HOSPADM

## 2019-12-26 RX ORDER — ENOXAPARIN SODIUM 100 MG/ML
40 INJECTION SUBCUTANEOUS EVERY 24 HOURS
Status: DISCONTINUED | OUTPATIENT
Start: 2019-12-26 | End: 2019-12-31 | Stop reason: HOSPADM

## 2019-12-26 RX ORDER — OXYCODONE HYDROCHLORIDE 5 MG/1
5 TABLET ORAL EVERY 6 HOURS PRN
Status: DISCONTINUED | OUTPATIENT
Start: 2019-12-26 | End: 2019-12-31 | Stop reason: HOSPADM

## 2019-12-26 RX ADMIN — VANCOMYCIN HYDROCHLORIDE 2250 MG: 100 INJECTION, POWDER, LYOPHILIZED, FOR SOLUTION INTRAVENOUS at 09:12

## 2019-12-26 RX ADMIN — PIPERACILLIN SODIUM AND TAZOBACTAM SODIUM 4.5 G: 4; .5 INJECTION, POWDER, LYOPHILIZED, FOR SOLUTION INTRAVENOUS at 05:12

## 2019-12-26 RX ADMIN — VANCOMYCIN HYDROCHLORIDE 1000 MG: 1 INJECTION, POWDER, LYOPHILIZED, FOR SOLUTION INTRAVENOUS at 08:12

## 2019-12-26 RX ADMIN — ENOXAPARIN SODIUM 40 MG: 100 INJECTION SUBCUTANEOUS at 05:12

## 2019-12-26 RX ADMIN — OXYCODONE HYDROCHLORIDE 5 MG: 5 TABLET ORAL at 11:12

## 2019-12-26 NOTE — ASSESSMENT & PLAN NOTE
Possible from the plantar fascitis vs from the injection.  Pt had couple of rounds of oral abx, not clearing the infection/edema totally.    Will place on iv vanc and zosyn.  Will order MRI. Although he had one a month ago, but no resolution of the symptoms with abx.  Blood cultures ordered in ED, doubt goof results given the abx recent use  Might consider ID consult if needed or podiatry consult

## 2019-12-26 NOTE — ED PROVIDER NOTES
Encounter Date: 12/26/2019    SCRIBE #1 NOTE: I, Georgina Hinton, am scribing for, and in the presence of,  Dr. Isaac. I have scribed the entire note.       History     Chief Complaint   Patient presents with    Foot Problem     Patient received a steroid shot on 11/16 to his left foot.  He began having swelling, tenderness and redness and seen at the ED.  He was given clyndamycin and Ibuprofen which helped him, but once finished he started having problems again.  He has seen several doctors since seen in ED with no improvement.  Last seen on 12/23 and given Keflex and told to come to ED in 2 days if not better.     This is a 64 y.o. male who  has a past medical history of Chronic seasonal allergic rhinitis (11/16/2017), GERD (gastroesophageal reflux disease), and TB (pulmonary tuberculosis) (1982). presents with chief complaint of left foot pain with onset a month ago. Pain is described as a throbbing pain to mostly the ankle. Associated symptoms include a subjective fever but no chills. Patient reports he was diagnosed with plantar fasciitis and received steroid shot on 11/16. Since then, patient has been seen several times for similar complaint. Patient was given Clindamycin,  which improved pain however once he finished antibiotics, pain returned. He was last seen on 12/23 and given Keflex with no improvement which prompted ED visit.     The history is provided by the patient.     Review of patient's allergies indicates:  No Known Allergies  Past Medical History:   Diagnosis Date    Chronic seasonal allergic rhinitis 11/16/2017    GERD (gastroesophageal reflux disease)     TB (pulmonary tuberculosis) 1982    Treated [facility] 9 mo therapy.     History reviewed. No pertinent surgical history.  Family History   Problem Relation Age of Onset    Breast cancer Mother     Prostate cancer Father     Emphysema Neg Hx      Social History     Tobacco Use    Smoking status: Former Smoker     Packs/day: 1.50     Years: 2.00      Pack years: 3.00     Types: Cigarettes     Last attempt to quit: 4/3/1977     Years since quittin.7    Smokeless tobacco: Never Used   Substance Use Topics    Alcohol use: Yes     Alcohol/week: 3.0 standard drinks     Types: 3 Cans of beer per week     Frequency: 2-3 times a week     Comment: one drink/week    Drug use: No     Review of Systems   Musculoskeletal:        + Ankle pain and swelling.   All other systems reviewed and are negative.      Physical Exam     Initial Vitals [19 0826]   BP Pulse Resp Temp SpO2   130/81 96 16 100 °F (37.8 °C) 98 %      MAP       --         Physical Exam    Constitutional: He appears well-developed and well-nourished. No distress.   HENT:   Head: Normocephalic and atraumatic.   Eyes: Conjunctivae and EOM are normal. Pupils are equal, round, and reactive to light.   Neck: Normal range of motion. Neck supple.   Cardiovascular: Normal rate, regular rhythm, normal heart sounds and intact distal pulses.   Pulses:       Dorsalis pedis pulses are 2+ on the right side, and 2+ on the left side.        Posterior tibial pulses are 2+ on the right side, and 2+ on the left side.   Pulmonary/Chest: Breath sounds normal. No stridor. No respiratory distress. He has no wheezes. He has no rhonchi. He has no rales.   Abdominal: Soft. Bowel sounds are normal. He exhibits no distension. There is no tenderness.   Musculoskeletal: Normal range of motion. He exhibits edema. He exhibits no tenderness.   Swelling of the left ankle with erythema distal to lateral malleolus of ankle.   Neurological: He is alert and oriented to person, place, and time. He has normal strength. No cranial nerve deficit.   Skin: Skin is warm and dry.   Psychiatric: He has a normal mood and affect.         ED Course   Procedures  Labs Reviewed   CBC W/ AUTO DIFFERENTIAL - Abnormal; Notable for the following components:       Result Value    MPV 9.1 (*)     All other components within normal limits    COMPREHENSIVE METABOLIC PANEL - Abnormal; Notable for the following components:    Glucose 136 (*)     Total Protein 8.6 (*)     Total Bilirubin 1.6 (*)     All other components within normal limits   CULTURE, BLOOD   CULTURE, BLOOD   LACTIC ACID, PLASMA          X-Rays:   Independently Interpreted Readings:   Other Readings:  Reviewed by myself, read by radiology.     Imaging Results          X-Ray Foot Complete Left (Final result)  Result time 12/26/19 09:26:22    Final result by Tari Montes MD (12/26/19 09:26:22)                 Impression:      Mild soft tissue swelling overlying the lateral malleolus, no acute process seen      Electronically signed by: Tari Montes MD  Date:    12/26/2019  Time:    09:26             Narrative:    EXAMINATION:  XR FOOT COMPLETE 3 VIEW LEFT    CLINICAL HISTORY:  .  Pain in left foot    TECHNIQUE:  AP, lateral and oblique views of the left foot were performed.    COMPARISON:  11/27/2019    FINDINGS:  The alignment and mineralization appear normal.  No fracture seen, no osseous lesions.  No advanced degenerative change.  Soft tissue swelling overlying the lateral malleolus.                              Medical Decision Making:   Clinical Tests:   Lab Tests: Ordered and Reviewed  Radiological Study: Ordered and Reviewed  ED Management:  64-year-old male with a left foot infection after getting an injection for plantar fasciitis earlier this month.  Patient has been on Keflex as well as clindamycin with no results.  I believe he requires IV antibiotics due to a failed trial of orals.  Discussed with Dr. Patel who will admit the patient.                                  Clinical Impression:       ICD-10-CM ICD-9-CM   1. Soft tissue infection L08.9 686.9   2. Left foot pain M79.672 729.5            I, Dr. Rafael Valiente, personally performed the services described in this documentation. All medical record entries made by the scribe were at my direction and in my  presence. I have reviewed the chart and agree that the record reflects my personal performance and is accurate and complete. Rafael Isaac MD.  10:16 AM 12/26/2019                   Rafael Isaac MD  12/26/19 [id number]

## 2019-12-26 NOTE — ED TRIAGE NOTES
Pt seen in the ED with complaints of L sided foot pain. Pt reports that he has been experiencing pain for a month, he has been seeing his PCP and has been treated multiple times with steroid shots and ABX. Pt reports that he was put in ABX which helped with the swelling but now it looks like the swelling is coming back and the pain is worsening. Pt has been taking ibuprofen for pain at home. Pts foot shows minimal swelling and redness to the external aspect of the ankle bellow the malleolus. He also reports a subjective fever at home. Pt is in no acute distress during time of assessment, will continue to monitor.

## 2019-12-26 NOTE — SUBJECTIVE & OBJECTIVE
Past Medical History:   Diagnosis Date    Chronic seasonal allergic rhinitis 2017    GERD (gastroesophageal reflux disease)     TB (pulmonary tuberculosis) 1982    Treated [facility] 9 mo therapy.       History reviewed. No pertinent surgical history.    Review of patient's allergies indicates:  No Known Allergies    No current facility-administered medications on file prior to encounter.      Current Outpatient Medications on File Prior to Encounter   Medication Sig    cephALEXin (KEFLEX) 500 MG capsule Take 1 capsule (500 mg total) by mouth every 8 (eight) hours. for 10 days    diclofenac sodium (VOLTAREN) 1 % Gel Apply 2 g topically 4 (four) times daily. (Patient not taking: Reported on 2019)    ibuprofen (ADVIL,MOTRIN) 600 MG tablet Take 1 tablet (600 mg total) by mouth 3 (three) times daily with meals. for 10 days    tadalafil (CIALIS) 20 MG Tab Take 1 tablet (20 mg total) by mouth every other day. Take every other day as needed     Family History     Problem Relation (Age of Onset)    Breast cancer Mother    Prostate cancer Father        Tobacco Use    Smoking status: Former Smoker     Packs/day: 1.50     Years: 2.00     Pack years: 3.00     Types: Cigarettes     Last attempt to quit: 4/3/1977     Years since quittin.7    Smokeless tobacco: Never Used   Substance and Sexual Activity    Alcohol use: Yes     Alcohol/week: 3.0 standard drinks     Types: 3 Cans of beer per week     Frequency: 2-3 times a week     Comment: one drink/week    Drug use: No    Sexual activity: Yes     Partners: Female     Review of Systems   Constitutional: Positive for activity change. Negative for diaphoresis and fatigue.   HENT: Negative for congestion.    Eyes: Negative for visual disturbance.   Respiratory: Negative for cough, shortness of breath and wheezing.    Cardiovascular: Positive for leg swelling (foot left). Negative for chest pain.   Gastrointestinal: Negative for abdominal distention,  constipation, nausea and vomiting.   Genitourinary: Negative for difficulty urinating and flank pain.   Musculoskeletal: Positive for gait problem (left foot pain). Negative for back pain.   Neurological: Negative for dizziness, light-headedness and numbness.   Psychiatric/Behavioral: Negative for agitation. The patient is not nervous/anxious.      Objective:     Vital Signs (Most Recent):  Temp: 100 °F (37.8 °C) (12/26/19 0826)  Pulse: 83 (12/26/19 1101)  Resp: 16 (12/26/19 1101)  BP: 125/77 (12/26/19 1101)  SpO2: 97 % (12/26/19 1101) Vital Signs (24h Range):  Temp:  [100 °F (37.8 °C)] 100 °F (37.8 °C)  Pulse:  [83-96] 83  Resp:  [16] 16  SpO2:  [97 %-99 %] 97 %  BP: (125-130)/(77-82) 125/77     Weight: 79.4 kg (175 lb)  Body mass index is 27 kg/m².    Physical Exam   Constitutional: He is oriented to person, place, and time. He appears well-developed and well-nourished.   HENT:   Head: Normocephalic and atraumatic.   Eyes: EOM are normal.   Neck: Normal range of motion. Neck supple. No JVD present.   Cardiovascular: Normal rate and regular rhythm.   No murmur heard.  Pulmonary/Chest: Effort normal and breath sounds normal. No respiratory distress.   Abdominal: Soft. He exhibits no distension. There is no tenderness.   Musculoskeletal: Normal range of motion. He exhibits edema (left ankle lower foor area around the calcauous, band like disctribution).   Neurological: He is alert and oriented to person, place, and time.   Skin: Skin is warm and dry.   Psychiatric: He has a normal mood and affect. His behavior is normal. Judgment and thought content normal.   Nursing note and vitals reviewed.        CRANIAL NERVES     CN III, IV, VI   Extraocular motions are normal.        Significant Labs:   Recent Labs   Lab 12/26/19  0857   WBC 7.22   HGB 15.0   HCT 44.4        Recent Labs   Lab 12/26/19  0857      K 3.6      CO2 23   BUN 9   CREATININE 1.0   *   CALCIUM 9.5     Recent Labs   Lab  12/26/19  0857   ALKPHOS 86   ALT 13   AST 15   ALBUMIN 4.1   PROT 8.6*   BILITOT 1.6*      No results for input(s): CPK, CPKMB, MB, TROPONINI in the last 72 hours.  No results for input(s): POCTGLUCOSE in the last 168 hours.  Hemoglobin A1C   Date Value Ref Range Status   04/05/2019 5.1 4.0 - 5.6 % Final     Comment:     ADA Screening Guidelines:  5.7-6.4%  Consistent with prediabetes  >or=6.5%  Consistent with diabetes  High levels of fetal hemoglobin interfere with the HbA1C  assay. Heterozygous hemoglobin variants (HbS, HgC, etc)do  not significantly interfere with this assay.   However, presence of multiple variants may affect accuracy.     11/16/2017 5.0 4.0 - 5.6 % Final     Comment:     According to ADA guidelines, hemoglobin A1c <7.0% represents  optimal control in non-pregnant diabetic patients. Different  metrics may apply to specific patient populations.   Standards of Medical Care in Diabetes-2016.  For the purpose of screening for the presence of diabetes:  <5.7%     Consistent with the absence of diabetes  5.7-6.4%  Consistent with increasing risk for diabetes   (prediabetes)  >or=6.5%  Consistent with diabetes  Currently, no consensus exists for use of hemoglobin A1c  for diagnosis of diabetes for children.  This Hemoglobin A1c assay has significant interference with fetal   hemoglobin   (HbF). The results are invalid for patients with abnormal amounts of   HbF,   including those with known Hereditary Persistence   of Fetal Hemoglobin. Heterozygous hemoglobin variants (HbAS, HbAC,   HbAD, HbAE, HbA2) do not significantly interfere with this assay;   however, presence of multiple variants in a sample may impact the %   interference.       Scheduled Meds:  Continuous Infusions:  As Needed:     Significant Imaging:   X-Ray Foot Complete Left  Narrative: EXAMINATION:  XR FOOT COMPLETE 3 VIEW LEFT    CLINICAL HISTORY:  .  Pain in left foot    TECHNIQUE:  AP, lateral and oblique views of the left foot were  performed.    COMPARISON:  11/27/2019    FINDINGS:  The alignment and mineralization appear normal.  No fracture seen, no osseous lesions.  No advanced degenerative change.  Soft tissue swelling overlying the lateral malleolus.  Impression: Mild soft tissue swelling overlying the lateral malleolus, no acute process seen    Electronically signed by: Tari Montes MD  Date:    12/26/2019  Time:    09:26

## 2019-12-26 NOTE — NURSING
Pt arrived to unit on portable monitor with family member at bedside. Will orient to unit. Will continue to monitor closely.

## 2019-12-26 NOTE — H&P
[facility] Hospital Medicine  History & Physical    Patient Name: Alessandro Cerna  MRN: [id number]  Admission Date: 12/26/2019  Attending Physician: Rolo Patel DO  Primary Care Provider: Jamaal Cabrera DO         Patient information was obtained from patient, spouse/SO and ER records.     Subjective:     Principal Problem:Cellulitis of left foot    Chief Complaint:   Chief Complaint   Patient presents with    Foot Problem     Patient received a steroid shot on 11/16 to his left foot.  He began having swelling, tenderness and redness and seen at the ED.  He was given clyndamycin and Ibuprofen which helped him, but once finished he started having problems again.  He has seen several doctors since seen in ED with no improvement.  Last seen on 12/23 and given Keflex and told to come to ED in 2 days if not better.        HPI: The patient is a 64 years old male pt with PMH significant for GERD on no medication, TB in 1982, S/P treatment, otherwise the pt is healthy.  The pt presented to ED with ongoing left ankle calcanuous edema and pain.  As per pt, he had plantar fascitis of the left foot, for which he was seen and evaluated by a (podiatrist) and had steroid injection at the medial part of the foot on 11/20, since then he had swelling and edema, pain in the left foot, he was seen by the MD who performed the injection and was told it is non infectiouis process.   The pt came to ED on 11/27 and was given 5 days worth of antibiotics Clindamycin (proably as pt recalls) and then was seen by his PCP who prescribed keflex for 5 days, he had some resolution but edema is back.  Pt also had MRI a month ago showing edema of the area of the fascia and possible the muscle.    The pt denies having medication at home, he is healthy otherwise.  Denies smokinh  He is a social drinker a drink or two a week.  Family hx non contributory, no DM that he knows of .    Pt to be placed in obs, start broad spectrum  ABX and MRI of the left foot    Past Medical History:   Diagnosis Date    Chronic seasonal allergic rhinitis 2017    GERD (gastroesophageal reflux disease)     TB (pulmonary tuberculosis) 1982    Treated [facility] 9 mo therapy.       History reviewed. No pertinent surgical history.    Review of patient's allergies indicates:  No Known Allergies    No current facility-administered medications on file prior to encounter.      Current Outpatient Medications on File Prior to Encounter   Medication Sig    cephALEXin (KEFLEX) 500 MG capsule Take 1 capsule (500 mg total) by mouth every 8 (eight) hours. for 10 days    diclofenac sodium (VOLTAREN) 1 % Gel Apply 2 g topically 4 (four) times daily. (Patient not taking: Reported on 2019)    ibuprofen (ADVIL,MOTRIN) 600 MG tablet Take 1 tablet (600 mg total) by mouth 3 (three) times daily with meals. for 10 days    tadalafil (CIALIS) 20 MG Tab Take 1 tablet (20 mg total) by mouth every other day. Take every other day as needed     Family History     Problem Relation (Age of Onset)    Breast cancer Mother    Prostate cancer Father        Tobacco Use    Smoking status: Former Smoker     Packs/day: 1.50     Years: 2.00     Pack years: 3.00     Types: Cigarettes     Last attempt to quit: 4/3/1977     Years since quittin.7    Smokeless tobacco: Never Used   Substance and Sexual Activity    Alcohol use: Yes     Alcohol/week: 3.0 standard drinks     Types: 3 Cans of beer per week     Frequency: 2-3 times a week     Comment: one drink/week    Drug use: No    Sexual activity: Yes     Partners: Female     Review of Systems   Constitutional: Positive for activity change. Negative for diaphoresis and fatigue.   HENT: Negative for congestion.    Eyes: Negative for visual disturbance.   Respiratory: Negative for cough, shortness of breath and wheezing.    Cardiovascular: Positive for leg swelling (foot left). Negative for chest pain.   Gastrointestinal: Negative for  abdominal distention, constipation, nausea and vomiting.   Genitourinary: Negative for difficulty urinating and flank pain.   Musculoskeletal: Positive for gait problem (left foot pain). Negative for back pain.   Neurological: Negative for dizziness, light-headedness and numbness.   Psychiatric/Behavioral: Negative for agitation. The patient is not nervous/anxious.      Objective:     Vital Signs (Most Recent):  Temp: 100 °F (37.8 °C) (12/26/19 0826)  Pulse: 83 (12/26/19 1101)  Resp: 16 (12/26/19 1101)  BP: 125/77 (12/26/19 1101)  SpO2: 97 % (12/26/19 1101) Vital Signs (24h Range):  Temp:  [100 °F (37.8 °C)] 100 °F (37.8 °C)  Pulse:  [83-96] 83  Resp:  [16] 16  SpO2:  [97 %-99 %] 97 %  BP: (125-130)/(77-82) 125/77     Weight: 79.4 kg (175 lb)  Body mass index is 27 kg/m².    Physical Exam   Constitutional: He is oriented to person, place, and time. He appears well-developed and well-nourished.   HENT:   Head: Normocephalic and atraumatic.   Eyes: EOM are normal.   Neck: Normal range of motion. Neck supple. No JVD present.   Cardiovascular: Normal rate and regular rhythm.   No murmur heard.  Pulmonary/Chest: Effort normal and breath sounds normal. No respiratory distress.   Abdominal: Soft. He exhibits no distension. There is no tenderness.   Musculoskeletal: Normal range of motion. He exhibits edema (left ankle lower foor area around the calcauous, band like disctribution).   Neurological: He is alert and oriented to person, place, and time.   Skin: Skin is warm and dry.   Psychiatric: He has a normal mood and affect. His behavior is normal. Judgment and thought content normal.   Nursing note and vitals reviewed.        CRANIAL NERVES     CN III, IV, VI   Extraocular motions are normal.        Significant Labs:   Recent Labs   Lab 12/26/19  0857   WBC 7.22   HGB 15.0   HCT 44.4        Recent Labs   Lab 12/26/19  0857      K 3.6      CO2 23   BUN 9   CREATININE 1.0   *   CALCIUM 9.5      Recent Labs   Lab 12/26/19  0857   ALKPHOS 86   ALT 13   AST 15   ALBUMIN 4.1   PROT 8.6*   BILITOT 1.6*      No results for input(s): CPK, CPKMB, MB, TROPONINI in the last 72 hours.  No results for input(s): POCTGLUCOSE in the last 168 hours.  Hemoglobin A1C   Date Value Ref Range Status   04/05/2019 5.1 4.0 - 5.6 % Final     Comment:     ADA Screening Guidelines:  5.7-6.4%  Consistent with prediabetes  >or=6.5%  Consistent with diabetes  High levels of fetal hemoglobin interfere with the HbA1C  assay. Heterozygous hemoglobin variants (HbS, HgC, etc)do  not significantly interfere with this assay.   However, presence of multiple variants may affect accuracy.     11/16/2017 5.0 4.0 - 5.6 % Final     Comment:     According to ADA guidelines, hemoglobin A1c <7.0% represents  optimal control in non-pregnant diabetic patients. Different  metrics may apply to specific patient populations.   Standards of Medical Care in Diabetes-2016.  For the purpose of screening for the presence of diabetes:  <5.7%     Consistent with the absence of diabetes  5.7-6.4%  Consistent with increasing risk for diabetes   (prediabetes)  >or=6.5%  Consistent with diabetes  Currently, no consensus exists for use of hemoglobin A1c  for diagnosis of diabetes for children.  This Hemoglobin A1c assay has significant interference with fetal   hemoglobin   (HbF). The results are invalid for patients with abnormal amounts of   HbF,   including those with known Hereditary Persistence   of Fetal Hemoglobin. Heterozygous hemoglobin variants (HbAS, HbAC,   HbAD, HbAE, HbA2) do not significantly interfere with this assay;   however, presence of multiple variants in a sample may impact the %   interference.       Scheduled Meds:  Continuous Infusions:  As Needed:     Significant Imaging:   X-Ray Foot Complete Left  Narrative: EXAMINATION:  XR FOOT COMPLETE 3 VIEW LEFT    CLINICAL HISTORY:  .  Pain in left foot    TECHNIQUE:  AP, lateral and oblique views  of the left foot were performed.    COMPARISON:  11/27/2019    FINDINGS:  The alignment and mineralization appear normal.  No fracture seen, no osseous lesions.  No advanced degenerative change.  Soft tissue swelling overlying the lateral malleolus.  Impression: Mild soft tissue swelling overlying the lateral malleolus, no acute process seen    Electronically signed by: Tari Montes MD  Date:    12/26/2019  Time:    09:26        Assessment/Plan:     * Cellulitis of left foot  Possible from the plantar fascitis vs from the injection.  Pt had couple of rounds of oral abx, not clearing the infection/edema totally.    Will place on iv vanc and zosyn.  Will order MRI. Although he had one a month ago, but no resolution of the symptoms with abx.  Blood cultures ordered in ED, doubt goof results given the abx recent use  Might consider ID consult if needed or podiatry consult      Pain in limb  Secondary to faciitis vs inflammation vs infection.  Symptomatic tx.  tx underlying cause      Edema of left foot  See above       GERD (gastroesophageal reflux disease)  Not on meds at home  monitor        VTE Risk Mitigation (From admission, onward)    None             Rolo Patel DO  Department of Hospital Medicine   [facility]

## 2019-12-26 NOTE — PROGRESS NOTES
Pharmacokinetic Initial Assessment: IV Vancomycin    Assessment/Plan:    Initiate intravenous vancomycin with loading dose of 2250 mg once   Desired empiric serum trough concentration is 10 to 15 mcg/mL  Draw vancomycin   Pharmacy will continue to follow and monitor vancomycin.      Please contact pharmacy at extension [phone] with any questions regarding this assessment.     Thank you for the consult,   Wes Ojeda       Patient brief summary:  Alessandro Cerna is a 64 y.o. male initiated on antimicrobial therapy with IV Vancomycin for treatment of suspected skin & soft tissue infection    Drug Allergies:   Review of patient's allergies indicates:  No Known Allergies    Actual Body Weight:   79kg    Renal Function:   CrCl cannot be calculated (Patient's most recent lab result is older than the maximum 7 days allowed.).,     Dialysis Method (if applicable):  N/A    CBC (last 72 hours):  No results for input(s): WHITE BLOOD CELL COUNT, HEMOGLOBIN, HEMATOCRIT, PLATELETS, GRAN%, LYMPH%, MONO%, EOSINOPHIL%, BASOPHIL%, DIFFERENTIAL METHOD in the last 72 hours.    Metabolic Panel (last 72 hours):  No results for input(s): SODIUM, POTASSIUM, CHLORIDE, CO2, GLUCOSE, BUN BLD, CREATININE, ALBUMIN, BILIRUBIN TOTAL, ALK PHOS, AST, ALT, MAGNESIUM, PHOSPHORUS in the last 72 hours.    Drug levels (last 3 results):  No results for input(s): VANCOMYCINRA, VANCOMYCINPE, VANCOMYCINTR in the last 72 hours.    Microbiologic Results:  Microbiology Results (last 7 days)       Procedure Component Value Units Date/Time    Blood Culture #1 **CANNOT BE ORDERED STAT** [[id number]]     Order Status:  No result Specimen:  Blood     Blood Culture #2 **CANNOT BE ORDERED STAT** [[id number]]     Order Status:  No result Specimen:  Blood

## 2019-12-26 NOTE — HPI
The patient is a 64 years old male pt with PMH significant for GERD on no medication, TB in 1982, S/P treatment, otherwise the pt is healthy.  The pt presented to ED with ongoing left ankle calcanuous edema and pain.  As per pt, he had plantar fascitis of the left foot, for which he was seen and evaluated by a (podiatrist) and had steroid injection at the medial part of the foot on 11/20, since then he had swelling and edema, pain in the left foot, he was seen by the MD who performed the injection and was told it is non infectiouis process.   The pt came to ED on 11/27 and was given 5 days worth of antibiotics Clindamycin (proably as pt recalls) and then was seen by his PCP who prescribed keflex for 5 days, he had some resolution but edema is back.  Pt also had MRI a month ago showing edema of the area of the fascia and possible the muscle.    The pt denies having medication at home, he is healthy otherwise.  Denies smokinh  He is a social drinker a drink or two a week.  Family hx non contributory, no DM that he knows of .    Pt to be placed in obs, start broad spectrum ABX and MRI of the left foot

## 2019-12-26 NOTE — PROGRESS NOTES
Pharmacokinetic Initial Assessment: IV Vancomycin    Assessment/Plan:    Initiate intravenous vancomycin with loading dose of 2250 mg once followed by a maintenance dose of vancomycin 1000 mg IV every 12 hours  Desired empiric serum trough concentration is 10 to 15 mcg/mL  Draw vancomycin trough level 30 min prior to fourth dose on 12/27/19 at approximately 2000   Pharmacy will continue to follow and monitor vancomycin.      Please contact pharmacy at extension [phone] with any questions regarding this assessment.     Thank you for the consult,   Lynnette Martinez       Patient brief summary:  Alessandro Cerna is a 64 y.o. male initiated on antimicrobial therapy with IV Vancomycin for treatment of suspected skin & soft tissue infection    Drug Allergies:   Review of patient's allergies indicates:  No Known Allergies    Actual Body Weight:   79.6 kg    Renal Function:   Estimated Creatinine Clearance: 77.5 mL/min (based on SCr of 1 mg/dL).,     Dialysis Method (if applicable):  N/A    CBC (last 72 hours):  Recent Labs   Lab Result Units 12/26/19  0857   WBC K/uL 7.22   Hemoglobin g/dL 15.0   Hematocrit % 44.4   Platelets K/uL 261   Gran% % 63.5   Lymph% % 27.4   Mono% % 8.6   Eosinophil% % 0.4   Basophil% % 0.1   Differential Method  Automated       Metabolic Panel (last 72 hours):  Recent Labs   Lab Result Units 12/26/19  0857   Sodium mmol/L 138   Potassium mmol/L 3.6   Chloride mmol/L 102   CO2 mmol/L 23   Glucose mg/dL 136*   BUN, Bld mg/dL 9   Creatinine mg/dL 1.0   Albumin g/dL 4.1   Total Bilirubin mg/dL 1.6*   Alkaline Phosphatase U/L 86   AST U/L 15   ALT U/L 13       Drug levels (last 3 results):  No results for input(s): VANCOMYCINRA, VANCOMYCINPE, VANCOMYCINTR in the last 72 hours.    Microbiologic Results:  Microbiology Results (last 7 days)       Procedure Component Value Units Date/Time    Blood Culture #1 **CANNOT BE ORDERED STAT** [[id number]] Collected:  12/26/19 0857    Order Status:  Sent  Specimen:  Blood from Peripheral, Antecubital, Right Updated:  12/26/19 1246    Blood Culture #2 **CANNOT BE ORDERED STAT** [[id number]] Collected:  12/26/19 0909    Order Status:  Sent Specimen:  Blood from Peripheral, Forearm, Right Updated:  12/26/19 1244

## 2019-12-26 NOTE — PLAN OF CARE
VN cued into room.  Pt resting comfortably in bed with call light and personal belongings within reach.  NADN.  Pt awake and alert, able to answer all admission questions.   Pt had questions regarding plan of care and antibiotics, all questions answered.  No family at bedside.  Pt reports no pain.  No other needs or complaints at this time.  Reminded pt to use call light as needed.  VN will continue to follow and be available as needed.

## 2019-12-27 ENCOUNTER — TELEPHONE (OUTPATIENT)
Dept: PODIATRY | Facility: CLINIC | Age: 64
End: 2019-12-27

## 2019-12-27 LAB
ANION GAP SERPL CALC-SCNC: 11 MMOL/L (ref 8–16)
BASOPHILS # BLD AUTO: 0.02 K/UL (ref 0–0.2)
BASOPHILS NFR BLD: 0.2 % (ref 0–1.9)
BUN SERPL-MCNC: 8 MG/DL (ref 8–23)
CALCIUM SERPL-MCNC: 9.1 MG/DL (ref 8.7–10.5)
CHLORIDE SERPL-SCNC: 102 MMOL/L (ref 95–110)
CO2 SERPL-SCNC: 24 MMOL/L (ref 23–29)
CREAT SERPL-MCNC: 0.8 MG/DL (ref 0.5–1.4)
DIFFERENTIAL METHOD: NORMAL
EOSINOPHIL # BLD AUTO: 0.1 K/UL (ref 0–0.5)
EOSINOPHIL NFR BLD: 0.5 % (ref 0–8)
ERYTHROCYTE [DISTWIDTH] IN BLOOD BY AUTOMATED COUNT: 13.5 % (ref 11.5–14.5)
EST. GFR  (AFRICAN AMERICAN): >60 ML/MIN/1.73 M^2
EST. GFR  (NON AFRICAN AMERICAN): >60 ML/MIN/1.73 M^2
GLUCOSE SERPL-MCNC: 95 MG/DL (ref 70–110)
HCT VFR BLD AUTO: 42.1 % (ref 40–54)
HGB BLD-MCNC: 14.3 G/DL (ref 14–18)
LYMPHOCYTES # BLD AUTO: 2 K/UL (ref 1–4.8)
LYMPHOCYTES NFR BLD: 20.6 % (ref 18–48)
MAGNESIUM SERPL-MCNC: 2 MG/DL (ref 1.6–2.6)
MCH RBC QN AUTO: 31 PG (ref 27–31)
MCHC RBC AUTO-ENTMCNC: 34 G/DL (ref 32–36)
MCV RBC AUTO: 91 FL (ref 82–98)
MONOCYTES # BLD AUTO: 0.9 K/UL (ref 0.3–1)
MONOCYTES NFR BLD: 9.4 % (ref 4–15)
NEUTROPHILS # BLD AUTO: 6.7 K/UL (ref 1.8–7.7)
NEUTROPHILS NFR BLD: 69.3 % (ref 38–73)
PLATELET # BLD AUTO: 260 K/UL (ref 150–350)
PMV BLD AUTO: 9.2 FL (ref 9.2–12.9)
POTASSIUM SERPL-SCNC: 4 MMOL/L (ref 3.5–5.1)
RBC # BLD AUTO: 4.61 M/UL (ref 4.6–6.2)
SODIUM SERPL-SCNC: 137 MMOL/L (ref 136–145)
VANCOMYCIN TROUGH SERPL-MCNC: 10.5 UG/ML (ref 10–22)
WBC # BLD AUTO: 9.72 K/UL (ref 3.9–12.7)

## 2019-12-27 PROCEDURE — 25500020 PHARM REV CODE 255: Performed by: HOSPITALIST

## 2019-12-27 PROCEDURE — G0378 HOSPITAL OBSERVATION PER HR: HCPCS

## 2019-12-27 PROCEDURE — A9585 GADOBUTROL INJECTION: HCPCS | Performed by: HOSPITALIST

## 2019-12-27 PROCEDURE — 63600175 PHARM REV CODE 636 W HCPCS: Performed by: HOSPITALIST

## 2019-12-27 PROCEDURE — 96376 TX/PRO/DX INJ SAME DRUG ADON: CPT

## 2019-12-27 PROCEDURE — 36415 COLL VENOUS BLD VENIPUNCTURE: CPT

## 2019-12-27 PROCEDURE — 25000003 PHARM REV CODE 250: Performed by: HOSPITALIST

## 2019-12-27 PROCEDURE — 85025 COMPLETE CBC W/AUTO DIFF WBC: CPT

## 2019-12-27 PROCEDURE — 96372 THER/PROPH/DIAG INJ SC/IM: CPT | Mod: 59

## 2019-12-27 PROCEDURE — 80048 BASIC METABOLIC PNL TOTAL CA: CPT

## 2019-12-27 PROCEDURE — 99243 OFF/OP CNSLTJ NEW/EST LOW 30: CPT | Mod: ,,, | Performed by: STUDENT IN AN ORGANIZED HEALTH CARE EDUCATION/TRAINING PROGRAM

## 2019-12-27 PROCEDURE — 99243 PR OFFICE CONSULTATION,LEVEL III: ICD-10-PCS | Mod: ,,, | Performed by: STUDENT IN AN ORGANIZED HEALTH CARE EDUCATION/TRAINING PROGRAM

## 2019-12-27 PROCEDURE — 83735 ASSAY OF MAGNESIUM: CPT

## 2019-12-27 PROCEDURE — 80202 ASSAY OF VANCOMYCIN: CPT

## 2019-12-27 PROCEDURE — 96374 THER/PROPH/DIAG INJ IV PUSH: CPT | Mod: 59

## 2019-12-27 RX ORDER — GADOBUTROL 604.72 MG/ML
8 INJECTION INTRAVENOUS
Status: COMPLETED | OUTPATIENT
Start: 2019-12-27 | End: 2019-12-27

## 2019-12-27 RX ADMIN — VANCOMYCIN HYDROCHLORIDE 1000 MG: 1 INJECTION, POWDER, LYOPHILIZED, FOR SOLUTION INTRAVENOUS at 09:12

## 2019-12-27 RX ADMIN — GADOBUTROL 8 ML: 604.72 INJECTION INTRAVENOUS at 11:12

## 2019-12-27 RX ADMIN — OXYCODONE HYDROCHLORIDE 5 MG: 5 TABLET ORAL at 02:12

## 2019-12-27 RX ADMIN — ENOXAPARIN SODIUM 40 MG: 100 INJECTION SUBCUTANEOUS at 04:12

## 2019-12-27 RX ADMIN — VANCOMYCIN HYDROCHLORIDE 1000 MG: 1 INJECTION, POWDER, LYOPHILIZED, FOR SOLUTION INTRAVENOUS at 08:12

## 2019-12-27 RX ADMIN — PIPERACILLIN SODIUM AND TAZOBACTAM SODIUM 4.5 G: 4; .5 INJECTION, POWDER, LYOPHILIZED, FOR SOLUTION INTRAVENOUS at 08:12

## 2019-12-27 RX ADMIN — PIPERACILLIN SODIUM AND TAZOBACTAM SODIUM 4.5 G: 4; .5 INJECTION, POWDER, LYOPHILIZED, FOR SOLUTION INTRAVENOUS at 01:12

## 2019-12-27 RX ADMIN — PIPERACILLIN SODIUM AND TAZOBACTAM SODIUM 4.5 G: 4; .5 INJECTION, POWDER, LYOPHILIZED, FOR SOLUTION INTRAVENOUS at 04:12

## 2019-12-27 NOTE — PLAN OF CARE
Pt lives in [place] with his spouse.  She will drive him home when discharged.  He works full time but has not been able to work for a month.  PCP is Dr. Cabrera.  White board updated with CM name and contact information.  Discharge brochure provided.  Pt encouraged to call with any questions or concerns.  Cm will continue to follow pt through transitions of care and assist with any discharge needs.     12/27/19 1510   Discharge Assessment   Assessment Type Discharge Planning Assessment   Confirmed/corrected address and phone number on facesheet? Yes   Assessment information obtained from? Patient   Communicated expected length of stay with patient/caregiver yes   Prior to hospitilization cognitive status: Alert/Oriented   Prior to hospitalization functional status: Needs Assistance   Current cognitive status: Alert/Oriented   Current Functional Status: Needs Assistance   Lives With spouse   Able to Return to Prior Arrangements yes   Is patient able to care for self after discharge? Yes   Patient's perception of discharge disposition home or selfcare   Readmission Within the Last 30 Days no previous admission in last 30 days   Patient currently being followed by outpatient case management? No   Patient currently receives any other outside agency services? No   Equipment Currently Used at Home none   Do you have any problems affording any of your prescribed medications? No   Is the patient taking medications as prescribed? yes   Does the patient have transportation home? Yes   Transportation Anticipated family or friend will provide   Does the patient receive services at the [facility]? No   Discharge Plan A Home   Discharge Plan B Home with family   DME Needed Upon Discharge    (TBD)   Patient/Family in Agreement with Plan yes     Jasen Pimentel RN,   [phone]

## 2019-12-27 NOTE — CONSULTS
[facility]  Podiatry  Consult Note    Patient Name: Alessandro Cerna  MRN: [id number]  Admission Date: 2019  Hospital Length of Stay: 0 days  Attending Physician: Rolo Patel DO  Primary Care Provider: Jamaal Cabrera DO     Inpatient consult to Podiatry  Consult performed by: Karo Marsh MD  Consult ordered by: Rolo Patel DO        Subjective:     History of Present Illness:  Pt is a 63 y/o male  has a past medical history of Chronic seasonal allergic rhinitis, GERD (gastroesophageal reflux disease), and TB (pulmonary tuberculosis). Pt followed by Dr. oCbb as outpt for plantar fasciitis. Pt states he received a corticosteroid injection about a month ago and has had pain and swelling since. States he has been on multiple course of antibiotics and the swelling has worsened overall. States he is also having soreness in the back of his leg. No other pedal complaints at this time.     Scheduled Meds:   enoxaparin  40 mg Subcutaneous Daily    piperacillin-tazobactam (ZOSYN) IVPB  4.5 g Intravenous Q8H    vancomycin (VANCOCIN) IVPB  1,000 mg Intravenous Q12H     Continuous Infusions:  PRN Meds:acetaminophen, acetaminophen, influenza, melatonin, ondansetron, oxyCODONE, sodium chloride 0.9%, Pharmacy to dose Vancomycin consult **AND** vancomycin - pharmacy to dose    Review of patient's allergies indicates:  No Known Allergies     Past Medical History:   Diagnosis Date    Chronic seasonal allergic rhinitis 2017    GERD (gastroesophageal reflux disease)     TB (pulmonary tuberculosis) 1982    Treated [facility]NO 9 mo therapy.     History reviewed. No pertinent surgical history.    Family History     Problem Relation (Age of Onset)    Breast cancer Mother    Prostate cancer Father        Tobacco Use    Smoking status: Former Smoker     Packs/day: 1.50     Years: 2.00     Pack years: 3.00     Types: Cigarettes     Last attempt to quit: 4/3/1977     Years since quittin.7     Smokeless tobacco: Never Used   Substance and Sexual Activity    Alcohol use: Yes     Alcohol/week: 3.0 standard drinks     Types: 3 Cans of beer per week     Frequency: 2-3 times a week     Comment: one drink/week    Drug use: No    Sexual activity: Yes     Partners: Female     Review of Systems   Constitutional: Negative for appetite change, chills, diaphoresis and fever.   Respiratory: Negative for shortness of breath.    Cardiovascular: Positive for leg swelling.   Gastrointestinal: Negative for nausea and vomiting.   Musculoskeletal: Negative for arthralgias, back pain, gait problem, joint swelling and myalgias.   Skin: Positive for color change. Negative for pallor, rash and wound.   Neurological: Negative for numbness.     Objective:     Vital Signs (Most Recent):  Temp: 98.1 °F (36.7 °C) (12/27/19 1506)  Pulse: 78 (12/27/19 1600)  Resp: 18 (12/27/19 1506)  BP: 111/66 (12/27/19 1506)  SpO2: 98 % (12/27/19 1506) Vital Signs (24h Range):  Temp:  [98 °F (36.7 °C)-98.6 °F (37 °C)] 98.1 °F (36.7 °C)  Pulse:  [63-86] 78  Resp:  [16-18] 18  SpO2:  [95 %-99 %] 98 %  BP: (111-124)/(58-80) 111/66     Weight: 78.8 kg (173 lb 11.6 oz)  Body mass index is 27.21 kg/m².    Foot Exam    Left Foot/Ankle      Inspection and Palpation  Left foot ecchymosis: Lateral reafoot.  Tenderness: calcaneus tenderness   Swelling: (Lateral heel)  Skin Exam: cellulitis, abnormal color and erythema; no drainage, no maceration and no ulcer     Neurovascular  Dorsalis pedis: 2+  Posterior tibial: 2+  Saphenous nerve sensation: normal  Tibial nerve sensation: normal  Superficial peroneal nerve sensation: normal  Deep peroneal nerve sensation: normal  Sural nerve sensation: normal            Laboratory:  A1C: No results for input(s): HGBA1C in the last 4320 hours.  Blood Cultures:   Recent Labs   Lab 12/26/19  0857 12/26/19  0909   LABBLOO No Growth to date  No Growth to date No Growth to date  No Growth to date     CBC:   Recent Labs   Lab  12/27/19  0507   WBC 9.72   RBC 4.61   HGB 14.3   HCT 42.1      MCV 91   MCH 31.0   MCHC 34.0     CRP:   Recent Labs   Lab 12/23/19 0818   CRP 39.1*     ESR:   Recent Labs   Lab 12/23/19 0818   SEDRATE 9     Wound Cultures: No results for input(s): LABAERO in the last 4320 hours.  Microbiology Results (last 7 days)     Procedure Component Value Units Date/Time    Blood Culture #1 **CANNOT BE ORDERED STAT** [[id number]] Collected:  12/26/19 0857    Order Status:  Completed Specimen:  Blood from Peripheral, Antecubital, Right Updated:  12/27/19 1412     Blood Culture, Routine No Growth to date      No Growth to date    Blood Culture #2 **CANNOT BE ORDERED STAT** [[id number]] Collected:  12/26/19 0909    Order Status:  Completed Specimen:  Blood from Peripheral, Forearm, Right Updated:  12/27/19 1412     Blood Culture, Routine No Growth to date      No Growth to date        Specimen (12h ago, onward)    None          Diagnostic Results:  MRI: I have reviewed all pertinent results/findings within the past 24 hours.  Left hindfoot MRI ordered    Clinical Findings:  Erythema and edema noted to lateral rearfoot with mild bogginess noted. Erythema appears to be localized without streaking noted. No drainage or malodor noted. Mild POP. No open wounds, skin appears stable and intact.           Assessment/Plan:     * Cellulitis of left foot  -Pt with localized swelling and erythema to lateral left foot, relates to pain along rearfoot and lower leg.   -Continue IV ABX for cellulitis per primary medicine  -Left rearfoot MRI ordered to rule out abscess, signs of deeper infection.   -Venous US reviewed, no signs of DVT  -Podiatry will follow      Soft tissue infection  As above    Pain in limb  As above    Abnormal chest x-ray  Per primary        Thank you for your consult. I will follow-up with patient. Please contact us if you have any additional questions.    Karo Marsh MD  Podiatry  [facility]

## 2019-12-27 NOTE — TELEPHONE ENCOUNTER
----- Message from Elsy Rees sent at 2019  3:08 PM CST -----  Contact: Pearl with unit secretary [phone]   CONSULTS:     Patient: Alessandro Cerna     : 1955    Clinic#: [id number]    Room number: 461    Referring MD: Dr. Patel     Diagnosis: infected  left foot  Digitalis mini    Person calling:Pearl with unit secretary [phone]

## 2019-12-27 NOTE — PLAN OF CARE
Discussed with pt. Fall precautions and preventions.Call light and personal items are placed within pt.s reach.Pt. Instructed to call for asssistance before attempting to get out of the bed,Pt. Verbalizes understanding.

## 2019-12-27 NOTE — ASSESSMENT & PLAN NOTE
-Pt with localized swelling and erythema to lateral left foot, relates to pain along rearfoot and lower leg.   -Continue IV ABX for cellulitis per primary medicine  -Left rearfoot MRI ordered to rule out abscess, signs of deeper infection.   -Venous US reviewed, no signs of DVT  -Podiatry will follow

## 2019-12-27 NOTE — HPI
Pt is a 63 y/o male  has a past medical history of Chronic seasonal allergic rhinitis, GERD (gastroesophageal reflux disease), and TB (pulmonary tuberculosis). Pt followed by Dr. Cobb as outpt for plantar fasciitis. Pt states he received a corticosteroid injection about a month ago and has had pain and swelling since. States he has been on multiple course of antibiotics and the swelling has worsened overall. States he is also having soreness in the back of his leg. No other pedal complaints at this time.

## 2019-12-27 NOTE — SUBJECTIVE & OBJECTIVE
Scheduled Meds:   enoxaparin  40 mg Subcutaneous Daily    piperacillin-tazobactam (ZOSYN) IVPB  4.5 g Intravenous Q8H    vancomycin (VANCOCIN) IVPB  1,000 mg Intravenous Q12H     Continuous Infusions:  PRN Meds:acetaminophen, acetaminophen, influenza, melatonin, ondansetron, oxyCODONE, sodium chloride 0.9%, Pharmacy to dose Vancomycin consult **AND** vancomycin - pharmacy to dose    Review of patient's allergies indicates:  No Known Allergies     Past Medical History:   Diagnosis Date    Chronic seasonal allergic rhinitis 2017    GERD (gastroesophageal reflux disease)     TB (pulmonary tuberculosis) 1982    Treated [facility]NO 9 mo therapy.     History reviewed. No pertinent surgical history.    Family History     Problem Relation (Age of Onset)    Breast cancer Mother    Prostate cancer Father        Tobacco Use    Smoking status: Former Smoker     Packs/day: 1.50     Years: 2.00     Pack years: 3.00     Types: Cigarettes     Last attempt to quit: 4/3/1977     Years since quittin.7    Smokeless tobacco: Never Used   Substance and Sexual Activity    Alcohol use: Yes     Alcohol/week: 3.0 standard drinks     Types: 3 Cans of beer per week     Frequency: 2-3 times a week     Comment: one drink/week    Drug use: No    Sexual activity: Yes     Partners: Female     Review of Systems   Constitutional: Negative for appetite change, chills, diaphoresis and fever.   Respiratory: Negative for shortness of breath.    Cardiovascular: Positive for leg swelling.   Gastrointestinal: Negative for nausea and vomiting.   Musculoskeletal: Negative for arthralgias, back pain, gait problem, joint swelling and myalgias.   Skin: Positive for color change. Negative for pallor, rash and wound.   Neurological: Negative for numbness.     Objective:     Vital Signs (Most Recent):  Temp: 98.1 °F (36.7 °C) (19 1506)  Pulse: 78 (19 1600)  Resp: 18 (19 1506)  BP: 111/66 (19 1506)  SpO2: 98 % (19 1506)  Vital Signs (24h Range):  Temp:  [98 °F (36.7 °C)-98.6 °F (37 °C)] 98.1 °F (36.7 °C)  Pulse:  [63-86] 78  Resp:  [16-18] 18  SpO2:  [95 %-99 %] 98 %  BP: (111-124)/(58-80) 111/66     Weight: 78.8 kg (173 lb 11.6 oz)  Body mass index is 27.21 kg/m².    Foot Exam    Left Foot/Ankle      Inspection and Palpation  Left foot ecchymosis: Lateral reafoot.  Tenderness: calcaneus tenderness   Swelling: (Lateral heel)  Skin Exam: cellulitis, abnormal color and erythema; no drainage, no maceration and no ulcer     Neurovascular  Dorsalis pedis: 2+  Posterior tibial: 2+  Saphenous nerve sensation: normal  Tibial nerve sensation: normal  Superficial peroneal nerve sensation: normal  Deep peroneal nerve sensation: normal  Sural nerve sensation: normal            Laboratory:  A1C: No results for input(s): HGBA1C in the last 4320 hours.  Blood Cultures:   Recent Labs   Lab 12/26/19 0857 12/26/19 0909   LABBLOO No Growth to date  No Growth to date No Growth to date  No Growth to date     CBC:   Recent Labs   Lab 12/27/19  0507   WBC 9.72   RBC 4.61   HGB 14.3   HCT 42.1      MCV 91   MCH 31.0   MCHC 34.0     CRP:   Recent Labs   Lab 12/23/19  0818   CRP 39.1*     ESR:   Recent Labs   Lab 12/23/19  0818   SEDRATE 9     Wound Cultures: No results for input(s): LABAERO in the last 4320 hours.  Microbiology Results (last 7 days)     Procedure Component Value Units Date/Time    Blood Culture #1 **CANNOT BE ORDERED STAT** [[id number]] Collected:  12/26/19 0857    Order Status:  Completed Specimen:  Blood from Peripheral, Antecubital, Right Updated:  12/27/19 1412     Blood Culture, Routine No Growth to date      No Growth to date    Blood Culture #2 **CANNOT BE ORDERED STAT** [[id number]] Collected:  12/26/19 0909    Order Status:  Completed Specimen:  Blood from Peripheral, Forearm, Right Updated:  12/27/19 1412     Blood Culture, Routine No Growth to date      No Growth to date        Specimen (12h ago, onward)    None           Diagnostic Results:  MRI: I have reviewed all pertinent results/findings within the past 24 hours.  Left hindfoot MRI ordered    Clinical Findings:  Erythema and edema noted to lateral rearfoot with mild bogginess noted. Erythema appears to be localized without streaking noted. No drainage or malodor noted. Mild POP. No open wounds, skin appears stable and intact.

## 2019-12-28 LAB
ANION GAP SERPL CALC-SCNC: 10 MMOL/L (ref 8–16)
BASOPHILS # BLD AUTO: 0.02 K/UL (ref 0–0.2)
BASOPHILS NFR BLD: 0.3 % (ref 0–1.9)
BUN SERPL-MCNC: 9 MG/DL (ref 8–23)
CALCIUM SERPL-MCNC: 9.2 MG/DL (ref 8.7–10.5)
CHLORIDE SERPL-SCNC: 102 MMOL/L (ref 95–110)
CO2 SERPL-SCNC: 27 MMOL/L (ref 23–29)
CREAT SERPL-MCNC: 1 MG/DL (ref 0.5–1.4)
DIFFERENTIAL METHOD: ABNORMAL
EOSINOPHIL # BLD AUTO: 0.1 K/UL (ref 0–0.5)
EOSINOPHIL NFR BLD: 0.7 % (ref 0–8)
ERYTHROCYTE [DISTWIDTH] IN BLOOD BY AUTOMATED COUNT: 13 % (ref 11.5–14.5)
EST. GFR  (AFRICAN AMERICAN): >60 ML/MIN/1.73 M^2
EST. GFR  (NON AFRICAN AMERICAN): >60 ML/MIN/1.73 M^2
GLUCOSE SERPL-MCNC: 104 MG/DL (ref 70–110)
HCT VFR BLD AUTO: 40.2 % (ref 40–54)
HGB BLD-MCNC: 13.4 G/DL (ref 14–18)
LYMPHOCYTES # BLD AUTO: 2 K/UL (ref 1–4.8)
LYMPHOCYTES NFR BLD: 27.2 % (ref 18–48)
MAGNESIUM SERPL-MCNC: 2.2 MG/DL (ref 1.6–2.6)
MCH RBC QN AUTO: 30.5 PG (ref 27–31)
MCHC RBC AUTO-ENTMCNC: 33.3 G/DL (ref 32–36)
MCV RBC AUTO: 92 FL (ref 82–98)
MONOCYTES # BLD AUTO: 0.9 K/UL (ref 0.3–1)
MONOCYTES NFR BLD: 11.5 % (ref 4–15)
NEUTROPHILS # BLD AUTO: 4.5 K/UL (ref 1.8–7.7)
NEUTROPHILS NFR BLD: 60.3 % (ref 38–73)
PLATELET # BLD AUTO: 252 K/UL (ref 150–350)
PMV BLD AUTO: 9 FL (ref 9.2–12.9)
POTASSIUM SERPL-SCNC: 5.4 MMOL/L (ref 3.5–5.1)
RBC # BLD AUTO: 4.39 M/UL (ref 4.6–6.2)
SODIUM SERPL-SCNC: 139 MMOL/L (ref 136–145)
WBC # BLD AUTO: 7.42 K/UL (ref 3.9–12.7)

## 2019-12-28 PROCEDURE — 25500020 PHARM REV CODE 255: Performed by: HOSPITALIST

## 2019-12-28 PROCEDURE — G0378 HOSPITAL OBSERVATION PER HR: HCPCS

## 2019-12-28 PROCEDURE — 25000003 PHARM REV CODE 250: Performed by: HOSPITALIST

## 2019-12-28 PROCEDURE — 36415 COLL VENOUS BLD VENIPUNCTURE: CPT

## 2019-12-28 PROCEDURE — 63600175 PHARM REV CODE 636 W HCPCS: Performed by: HOSPITALIST

## 2019-12-28 PROCEDURE — 80048 BASIC METABOLIC PNL TOTAL CA: CPT

## 2019-12-28 PROCEDURE — 99233 PR SUBSEQUENT HOSPITAL CARE,LEVL III: ICD-10-PCS | Mod: ,,, | Performed by: PODIATRIST

## 2019-12-28 PROCEDURE — 85025 COMPLETE CBC W/AUTO DIFF WBC: CPT

## 2019-12-28 PROCEDURE — A9585 GADOBUTROL INJECTION: HCPCS | Performed by: HOSPITALIST

## 2019-12-28 PROCEDURE — 99233 SBSQ HOSP IP/OBS HIGH 50: CPT | Mod: ,,, | Performed by: PODIATRIST

## 2019-12-28 PROCEDURE — 96376 TX/PRO/DX INJ SAME DRUG ADON: CPT

## 2019-12-28 PROCEDURE — 83735 ASSAY OF MAGNESIUM: CPT

## 2019-12-28 PROCEDURE — 96372 THER/PROPH/DIAG INJ SC/IM: CPT

## 2019-12-28 RX ORDER — GADOBUTROL 604.72 MG/ML
8 INJECTION INTRAVENOUS
Status: COMPLETED | OUTPATIENT
Start: 2019-12-28 | End: 2019-12-28

## 2019-12-28 RX ADMIN — PIPERACILLIN SODIUM AND TAZOBACTAM SODIUM 4.5 G: 4; .5 INJECTION, POWDER, LYOPHILIZED, FOR SOLUTION INTRAVENOUS at 01:12

## 2019-12-28 RX ADMIN — VANCOMYCIN HYDROCHLORIDE 1000 MG: 1 INJECTION, POWDER, LYOPHILIZED, FOR SOLUTION INTRAVENOUS at 08:12

## 2019-12-28 RX ADMIN — PIPERACILLIN SODIUM AND TAZOBACTAM SODIUM 4.5 G: 4; .5 INJECTION, POWDER, LYOPHILIZED, FOR SOLUTION INTRAVENOUS at 05:12

## 2019-12-28 RX ADMIN — GADOBUTROL 8 ML: 604.72 INJECTION INTRAVENOUS at 07:12

## 2019-12-28 RX ADMIN — OXYCODONE HYDROCHLORIDE 5 MG: 5 TABLET ORAL at 01:12

## 2019-12-28 RX ADMIN — VANCOMYCIN HYDROCHLORIDE 1000 MG: 1 INJECTION, POWDER, LYOPHILIZED, FOR SOLUTION INTRAVENOUS at 10:12

## 2019-12-28 RX ADMIN — PIPERACILLIN SODIUM AND TAZOBACTAM SODIUM 4.5 G: 4; .5 INJECTION, POWDER, LYOPHILIZED, FOR SOLUTION INTRAVENOUS at 09:12

## 2019-12-28 RX ADMIN — ENOXAPARIN SODIUM 40 MG: 100 INJECTION SUBCUTANEOUS at 05:12

## 2019-12-28 NOTE — ASSESSMENT & PLAN NOTE
Secondary to faciitis vs inflammation vs infection.  Symptomatic tx.  tx underlying cause  12/27 no DVT on Us  12/28 improving slowly

## 2019-12-28 NOTE — HOSPITAL COURSE
12/27 pt doing ok, MRI no osteo, discussing with podiatry, and pt had pain in left tigh , no DVT on US  12/28 pt seen, he is clinically stable awaiting the rearfoot MRI to be completed, appreciate podiatry help in setting it up today. Cultures still NGTD. Will continue IV abx, awaiting MRI and recs by podiatry.  12/29 pt seen, he still feeling ok, no fever, the pt need to be evaluated by podiatry and ID for the possible fluid collection/abscess/ start of osteo in th rear left foot  12/30 pt seen, he had I&D and left rear foot evacuation of the blood collection, cultures intra-op were collected. ID recommended continue vacn and zosyn for now. explaied to pt if we get a result of culture we might be able to zoom in on abx for home use and possible need for PICC line if ID recommend extended IV abx  12/32 appreciate ID rec-s discharge on Augment x 2 weeks

## 2019-12-28 NOTE — ASSESSMENT & PLAN NOTE
Possible from the plantar fascitis vs from the injection.  Pt had couple of rounds of oral abx, not clearing the infection/edema totally.    Will place on iv vanc and zosyn.  Will order MRI. Although he had one a month ago, but no resolution of the symptoms with abx.  Blood cultures ordered in ED, doubt goof results given the abx recent use  Might consider ID consult if needed or podiatry consult    12/27 no osteo on mri  Discuss with DWIGHT

## 2019-12-28 NOTE — PROGRESS NOTES
[facility] Hospital Medicine  Progress Note    Patient Name: Alessandro Cerna  MRN: [id number]  Patient Class: OP- Observation   Admission Date: 12/26/2019  Length of Stay: 0 days  Attending Physician: Rolo Patel DO  Primary Care Provider: Jamaal Cabrera DO        Subjective:     Principal Problem:Cellulitis of left foot        HPI:  The patient is a 64 years old male pt with PMH significant for GERD on no medication, TB in 1982, S/P treatment, otherwise the pt is healthy.  The pt presented to ED with ongoing left ankle calcanuous edema and pain.  As per pt, he had plantar fascitis of the left foot, for which he was seen and evaluated by a (podiatrist) and had steroid injection at the medial part of the foot on 11/20, since then he had swelling and edema, pain in the left foot, he was seen by the MD who performed the injection and was told it is non infectiouis process.   The pt came to ED on 11/27 and was given 5 days worth of antibiotics Clindamycin (proably as pt recalls) and then was seen by his PCP who prescribed keflex for 5 days, he had some resolution but edema is back.  Pt also had MRI a month ago showing edema of the area of the fascia and possible the muscle.    The pt denies having medication at home, he is healthy otherwise.  Denies smokinh  He is a social drinker a drink or two a week.  Family hx non contributory, no DM that he knows of .    Pt to be placed in obs, start broad spectrum ABX and MRI of the left foot    Overview/Hospital Course:  12/27 pt doing ok, MRI no osteo, discussing with podiatry, and pt had pain in left tigh , no DVT on US    Past Medical History:   Diagnosis Date    Chronic seasonal allergic rhinitis 11/16/2017    GERD (gastroesophageal reflux disease)     TB (pulmonary tuberculosis) 1982    Treated [facility]NO 9 mo therapy.       History reviewed. No pertinent surgical history.    Review of patient's allergies indicates:  No Known Allergies    No current  facility-administered medications on file prior to encounter.      Current Outpatient Medications on File Prior to Encounter   Medication Sig    cephALEXin (KEFLEX) 500 MG capsule Take 1 capsule (500 mg total) by mouth every 8 (eight) hours. for 10 days    diclofenac sodium (VOLTAREN) 1 % Gel Apply 2 g topically 4 (four) times daily.    tadalafil (CIALIS) 20 MG Tab Take 1 tablet (20 mg total) by mouth every other day. Take every other day as needed    ibuprofen (ADVIL,MOTRIN) 600 MG tablet Take 1 tablet (600 mg total) by mouth 3 (three) times daily with meals. for 10 days     Family History     Problem Relation (Age of Onset)    Breast cancer Mother    Prostate cancer Father        Tobacco Use    Smoking status: Former Smoker     Packs/day: 1.50     Years: 2.00     Pack years: 3.00     Types: Cigarettes     Last attempt to quit: 4/3/1977     Years since quittin.7    Smokeless tobacco: Never Used   Substance and Sexual Activity    Alcohol use: Yes     Alcohol/week: 3.0 standard drinks     Types: 3 Cans of beer per week     Frequency: 2-3 times a week     Comment: one drink/week    Drug use: No    Sexual activity: Yes     Partners: Female     Review of Systems   Constitutional: Negative for activity change, diaphoresis and fatigue.   HENT: Negative for congestion.    Eyes: Negative for visual disturbance.   Respiratory: Negative for cough, shortness of breath and wheezing.    Cardiovascular: Positive for leg swelling (foot left). Negative for chest pain.   Gastrointestinal: Negative for abdominal distention, constipation, nausea and vomiting.   Genitourinary: Negative for difficulty urinating and flank pain.   Musculoskeletal: Positive for gait problem (left foot pain) and myalgias (left thigh). Negative for back pain.   Neurological: Negative for dizziness, light-headedness and numbness.   Psychiatric/Behavioral: Negative for agitation. The patient is not nervous/anxious.      Objective:     Vital Signs  (Most Recent):  Temp: 98.1 °F (36.7 °C) (12/27/19 1506)  Pulse: 78 (12/27/19 1600)  Resp: 18 (12/27/19 1506)  BP: 111/66 (12/27/19 1506)  SpO2: 98 % (12/27/19 1506) Vital Signs (24h Range):  Temp:  [98 °F (36.7 °C)-98.6 °F (37 °C)] 98.1 °F (36.7 °C)  Pulse:  [63-86] 78  Resp:  [16-18] 18  SpO2:  [95 %-99 %] 98 %  BP: (111-124)/(58-80) 111/66     Weight: 78.8 kg (173 lb 11.6 oz)  Body mass index is 27.21 kg/m².    Physical Exam   Constitutional: He is oriented to person, place, and time. He appears well-developed and well-nourished.   HENT:   Head: Normocephalic and atraumatic.   Eyes: EOM are normal.   Neck: Normal range of motion. Neck supple. No JVD present.   Cardiovascular: Normal rate and regular rhythm.   No murmur heard.  Pulmonary/Chest: Effort normal and breath sounds normal. No respiratory distress.   Abdominal: Soft. He exhibits no distension. There is no tenderness.   Musculoskeletal: Normal range of motion. He exhibits edema (left ankle lower foor area around the calcauous, band like disctribution).   Neurological: He is alert and oriented to person, place, and time.   Skin: Skin is warm and dry.   Psychiatric: He has a normal mood and affect. His behavior is normal. Judgment and thought content normal.   Nursing note and vitals reviewed.        CRANIAL NERVES     CN III, IV, VI   Extraocular motions are normal.        Significant Labs:   Recent Labs   Lab 12/26/19  0857 12/27/19  0507   WBC 7.22 9.72   HGB 15.0 14.3   HCT 44.4 42.1    260     Recent Labs   Lab 12/26/19  0857 12/27/19  0506    137   K 3.6 4.0    102   CO2 23 24   BUN 9 8   CREATININE 1.0 0.8   * 95   CALCIUM 9.5 9.1   MG  --  2.0     Recent Labs   Lab 12/26/19  0857   ALKPHOS 86   ALT 13   AST 15   ALBUMIN 4.1   PROT 8.6*   BILITOT 1.6*      No results for input(s): CPK, CPKMB, MB, TROPONINI in the last 72 hours.  No results for input(s): POCTGLUCOSE in the last 168 hours.  Hemoglobin A1C   Date Value Ref Range  Status   04/05/2019 5.1 4.0 - 5.6 % Final     Comment:     ADA Screening Guidelines:  5.7-6.4%  Consistent with prediabetes  >or=6.5%  Consistent with diabetes  High levels of fetal hemoglobin interfere with the HbA1C  assay. Heterozygous hemoglobin variants (HbS, HgC, etc)do  not significantly interfere with this assay.   However, presence of multiple variants may affect accuracy.     11/16/2017 5.0 4.0 - 5.6 % Final     Comment:     According to ADA guidelines, hemoglobin A1c <7.0% represents  optimal control in non-pregnant diabetic patients. Different  metrics may apply to specific patient populations.   Standards of Medical Care in Diabetes-2016.  For the purpose of screening for the presence of diabetes:  <5.7%     Consistent with the absence of diabetes  5.7-6.4%  Consistent with increasing risk for diabetes   (prediabetes)  >or=6.5%  Consistent with diabetes  Currently, no consensus exists for use of hemoglobin A1c  for diagnosis of diabetes for children.  This Hemoglobin A1c assay has significant interference with fetal   hemoglobin   (HbF). The results are invalid for patients with abnormal amounts of   HbF,   including those with known Hereditary Persistence   of Fetal Hemoglobin. Heterozygous hemoglobin variants (HbAS, HbAC,   HbAD, HbAE, HbA2) do not significantly interfere with this assay;   however, presence of multiple variants in a sample may impact the %   interference.       Scheduled Meds:  Continuous Infusions:  As Needed:     Significant Imaging:   US Lower Extremity Veins Left  Narrative: EXAMINATION:  US LOWER EXTREMITY VEINS LEFT    CLINICAL HISTORY:  femoral thigh pain, R/O DVT;    TECHNIQUE:  Duplex and color flow Doppler evaluation and graded compression of the left lower extremity    COMPARISON:  None    FINDINGS:  Left thigh veins: Common femoral, superficial femoral, popliteal, upper greater saphenous and deep femoral veins demonstrate normal compression, phasic flow and augmentation  without evidence for filling defects.    Left calf veins: The visualized calf veins are patent.    Contralateral CFV: The contralateral (right) common femoral vein is patent and free of thrombus.    Miscellaneous: None  Impression: 1. No sonographic evidence of DVT in the LLE.    Electronically signed by: Bishop Ellis  Date:    12/27/2019  Time:    14:56  MRI Foot (Forefoot) Left W W/O Contrast  Narrative: EXAMINATION:  MRI FOOT (FOREFOOT) LEFT W W/O CONTRAST    CLINICAL HISTORY:  Osteomyelitis suspected, foot swelling, diabetic;    TECHNIQUE:  Multiplanar, multisequence MR imaging of the left forefoot with and without the use of intravenous contrast    COMPARISON:  Left foot radiograph dated 12/26/2019 and MRI hindfoot dated 12/12/2019    FINDINGS:  T1 marrow signal of the forefoot appears preserved.  No abnormal osseous edema.  Post-contrast images demonstrate no discrete enhancing collection.  There is abnormal intramuscular edema and enhancement of the flexor digitorum brevis and abductor digiti minimi along plantar foot, incompletely visualized.  Flexor and extensor tendons appear maintained.  No evidence for Arciniega's neuroma.  Impression: No evidence for osteomyelitis.    Abnormal edema and enhancement of the flexor digitorum brevis and abductor digiti minimi along the plantar foot, partially visualized and potentially infectious versus inflammatory in nature.    Electronically signed by: Rusty David  Date:    12/27/2019  Time:    12:30          Assessment/Plan:      * Cellulitis of left foot  Possible from the plantar fascitis vs from the injection.  Pt had couple of rounds of oral abx, not clearing the infection/edema totally.    Will place on iv vanc and zosyn.  Will order MRI. Although he had one a month ago, but no resolution of the symptoms with abx.  Blood cultures ordered in ED, doubt goof results given the abx recent use  Might consider ID consult if needed or podiatry consult    12/27 no osteo  on mri  Discuss with DPM      Pain in limb  Secondary to faciitis vs inflammation vs infection.  Symptomatic tx.  tx underlying cause  12/27 no DVT on Us      Edema of left foot  See above       GERD (gastroesophageal reflux disease)  Not on meds at home  monitor      Soft tissue infection  12/27 consulted podiatry for help in management        VTE Risk Mitigation (From admission, onward)         Ordered     enoxaparin injection 40 mg  Daily      12/26/19 1432     IP VTE HIGH RISK PATIENT  Once      12/26/19 1627                      Rolo Patel DO  Department of Hospital Medicine   [facility]

## 2019-12-28 NOTE — ASSESSMENT & PLAN NOTE
Possible from the plantar fascitis vs from the injection.  Pt had couple of rounds of oral abx, not clearing the infection/edema totally.    Will place on iv vanc and zosyn.  Will order MRI. Although he had one a month ago, but no resolution of the symptoms with abx.  Blood cultures ordered in ED, doubt goof results given the abx recent use  Might consider ID consult if needed or podiatry consult    12/27 no osteo on mri  Discuss with DPM  12/28 awaiting MRI rear foot.  Continue abx,  Neg cultures so far

## 2019-12-28 NOTE — PROGRESS NOTES
[facility] Hospital Medicine  Progress Note    Patient Name: Alessandro Cerna  MRN: [id number]  Patient Class: OP- Observation   Admission Date: 12/26/2019  Length of Stay: 0 days  Attending Physician: Rolo Patel DO  Primary Care Provider: Jamaal Cabrera DO        Subjective:     Principal Problem:Cellulitis of left foot        HPI:  The patient is a 64 years old male pt with PMH significant for GERD on no medication, TB in 1982, S/P treatment, otherwise the pt is healthy.  The pt presented to ED with ongoing left ankle calcanuous edema and pain.  As per pt, he had plantar fascitis of the left foot, for which he was seen and evaluated by a (podiatrist) and had steroid injection at the medial part of the foot on 11/20, since then he had swelling and edema, pain in the left foot, he was seen by the MD who performed the injection and was told it is non infectiouis process.   The pt came to ED on 11/27 and was given 5 days worth of antibiotics Clindamycin (proably as pt recalls) and then was seen by his PCP who prescribed keflex for 5 days, he had some resolution but edema is back.  Pt also had MRI a month ago showing edema of the area of the fascia and possible the muscle.    The pt denies having medication at home, he is healthy otherwise.  Denies smokinh  He is a social drinker a drink or two a week.  Family hx non contributory, no DM that he knows of .    Pt to be placed in obs, start broad spectrum ABX and MRI of the left foot    Overview/Hospital Course:  12/27 pt doing ok, MRI no osteo, discussing with podiatry, and pt had pain in left tigh , no DVT on US  12/28 pt seen, he is clinically stable awaiting the rearfoot MRI to be completed, appreciate podiatry help in setting it up today. Cultures still NGTD. Will continue IV abx, awaiting MRI and recs by podiatry.    Interval History: pt to have rear foot MRI today, and for now continue current abx    Review of Systems   Constitutional:  Negative for activity change and chills.   Respiratory: Negative for cough and shortness of breath.    Cardiovascular: Negative for chest pain.   Musculoskeletal: Positive for arthralgias (left foot-site of erythema).   Neurological: Negative for dizziness and numbness.   Psychiatric/Behavioral: Negative for agitation. The patient is not nervous/anxious.      Objective:     Vital Signs (Most Recent):  Temp: 98.8 °F (37.1 °C) (12/28/19 1636)  Pulse: 77 (12/28/19 1636)  Resp: 20 (12/28/19 1636)  BP: 137/76 (12/28/19 1636)  SpO2: 98 % (12/28/19 1636) Vital Signs (24h Range):  Temp:  [97.6 °F (36.4 °C)-99 °F (37.2 °C)] 98.8 °F (37.1 °C)  Pulse:  [68-86] 77  Resp:  [17-20] 20  SpO2:  [97 %-98 %] 98 %  BP: (103-137)/(60-76) 137/76     Weight: 76.8 kg (169 lb 5 oz)  Body mass index is 26.52 kg/m².    Intake/Output Summary (Last 24 hours) at 12/28/2019 1719  Last data filed at 12/28/2019 1300  Gross per 24 hour   Intake 545 ml   Output 2051 ml   Net -1506 ml      Physical Exam   Constitutional: He is oriented to person, place, and time. He appears well-developed and well-nourished.   HENT:   Head: Normocephalic and atraumatic.   Eyes: EOM are normal.   Neck: Normal range of motion. Neck supple. No JVD present.   Cardiovascular: Normal rate.   Pulmonary/Chest: Effort normal.   Abdominal: Soft.   Musculoskeletal: Normal range of motion. He exhibits edema (left rear foot).   Neurological: He is alert and oriented to person, place, and time.   Psychiatric: He has a normal mood and affect. His behavior is normal. Judgment and thought content normal.   Nursing note and vitals reviewed.      Significant Labs:   Recent Labs   Lab 12/26/19  0857 12/27/19  0507 12/28/19  0508   WBC 7.22 9.72 7.42   HGB 15.0 14.3 13.4*   HCT 44.4 42.1 40.2    260 252     Recent Labs   Lab 12/26/19  0857 12/27/19  0506 12/28/19  0508    137 139   K 3.6 4.0 5.4*    102 102   CO2 23 24 27   BUN 9 8 9   CREATININE 1.0 0.8 1.0   *  95 104   CALCIUM 9.5 9.1 9.2   MG  --  2.0 2.2     Recent Labs   Lab 12/26/19  0857   ALKPHOS 86   ALT 13   AST 15   ALBUMIN 4.1   PROT 8.6*   BILITOT 1.6*      No results for input(s): CPK, CPKMB, MB, TROPONINI in the last 72 hours.  No results for input(s): POCTGLUCOSE in the last 168 hours.  Hemoglobin A1C   Date Value Ref Range Status   04/05/2019 5.1 4.0 - 5.6 % Final     Comment:     ADA Screening Guidelines:  5.7-6.4%  Consistent with prediabetes  >or=6.5%  Consistent with diabetes  High levels of fetal hemoglobin interfere with the HbA1C  assay. Heterozygous hemoglobin variants (HbS, HgC, etc)do  not significantly interfere with this assay.   However, presence of multiple variants may affect accuracy.     11/16/2017 5.0 4.0 - 5.6 % Final     Comment:     According to ADA guidelines, hemoglobin A1c <7.0% represents  optimal control in non-pregnant diabetic patients. Different  metrics may apply to specific patient populations.   Standards of Medical Care in Diabetes-2016.  For the purpose of screening for the presence of diabetes:  <5.7%     Consistent with the absence of diabetes  5.7-6.4%  Consistent with increasing risk for diabetes   (prediabetes)  >or=6.5%  Consistent with diabetes  Currently, no consensus exists for use of hemoglobin A1c  for diagnosis of diabetes for children.  This Hemoglobin A1c assay has significant interference with fetal   hemoglobin   (HbF). The results are invalid for patients with abnormal amounts of   HbF,   including those with known Hereditary Persistence   of Fetal Hemoglobin. Heterozygous hemoglobin variants (HbAS, HbAC,   HbAD, HbAE, HbA2) do not significantly interfere with this assay;   however, presence of multiple variants in a sample may impact the %   interference.       Scheduled Meds:   enoxaparin  40 mg Subcutaneous Daily    piperacillin-tazobactam (ZOSYN) IVPB  4.5 g Intravenous Q8H    vancomycin (VANCOCIN) IVPB  1,000 mg Intravenous Q12H     Continuous  Infusions:  As Needed: acetaminophen, acetaminophen, influenza, melatonin, ondansetron, oxyCODONE, sodium chloride 0.9%, Pharmacy to dose Vancomycin consult **AND** vancomycin - pharmacy to dose  Significant Imaging:   US Lower Extremity Veins Left  Narrative: EXAMINATION:  US LOWER EXTREMITY VEINS LEFT    CLINICAL HISTORY:  femoral thigh pain, R/O DVT;    TECHNIQUE:  Duplex and color flow Doppler evaluation and graded compression of the left lower extremity    COMPARISON:  None    FINDINGS:  Left thigh veins: Common femoral, superficial femoral, popliteal, upper greater saphenous and deep femoral veins demonstrate normal compression, phasic flow and augmentation without evidence for filling defects.    Left calf veins: The visualized calf veins are patent.    Contralateral CFV: The contralateral (right) common femoral vein is patent and free of thrombus.    Miscellaneous: None  Impression: 1. No sonographic evidence of DVT in the LLE.    Electronically signed by: Bishop Ellis  Date:    12/27/2019  Time:    14:56  MRI Foot (Forefoot) Left W W/O Contrast  Narrative: EXAMINATION:  MRI FOOT (FOREFOOT) LEFT W W/O CONTRAST    CLINICAL HISTORY:  Osteomyelitis suspected, foot swelling, diabetic;    TECHNIQUE:  Multiplanar, multisequence MR imaging of the left forefoot with and without the use of intravenous contrast    COMPARISON:  Left foot radiograph dated 12/26/2019 and MRI hindfoot dated 12/12/2019    FINDINGS:  T1 marrow signal of the forefoot appears preserved.  No abnormal osseous edema.  Post-contrast images demonstrate no discrete enhancing collection.  There is abnormal intramuscular edema and enhancement of the flexor digitorum brevis and abductor digiti minimi along plantar foot, incompletely visualized.  Flexor and extensor tendons appear maintained.  No evidence for Arciniega's neuroma.  Impression: No evidence for osteomyelitis.    Abnormal edema and enhancement of the flexor digitorum brevis and abductor  digiti minimi along the plantar foot, partially visualized and potentially infectious versus inflammatory in nature.    Electronically signed by: Rusty David  Date:    12/27/2019  Time:    12:30          Assessment/Plan:      * Cellulitis of left foot  Possible from the plantar fascitis vs from the injection.  Pt had couple of rounds of oral abx, not clearing the infection/edema totally.    Will place on iv vanc and zosyn.  Will order MRI. Although he had one a month ago, but no resolution of the symptoms with abx.  Blood cultures ordered in ED, doubt goof results given the abx recent use  Might consider ID consult if needed or podiatry consult    12/27 no osteo on mri  Discuss with DPM  12/28 awaiting MRI rear foot.  Continue abx,  Neg cultures so far      Pain in limb  Secondary to faciitis vs inflammation vs infection.  Symptomatic tx.  tx underlying cause  12/27 no DVT on Us  12/28 improving slowly    Edema of left foot  See above       GERD (gastroesophageal reflux disease)  Not on meds at home  monitor      Soft tissue infection  12/27 consulted podiatry for help in management  12/28 DPM on board.  MRI rear foot ordered      VTE Risk Mitigation (From admission, onward)         Ordered     enoxaparin injection 40 mg  Daily      12/26/19 1432     IP VTE HIGH RISK PATIENT  Once      12/26/19 1627                      Rolo Patel DO  Department of Hospital Medicine   [facility]

## 2019-12-28 NOTE — ASSESSMENT & PLAN NOTE
-Pt with localized swelling and erythema to lateral left foot, relates to pain along rearfoot and lower leg.   -Continue IV ABX for cellulitis per primary medicine  -Left rearfoot MRI was ordered 12/27 to rule out abscess, signs of deeper infection. Patient still had not received MRI hindfoot 12/28 @ 4:37 PM and I was informed that a STAT MRI order was required. Ordered STAT MRI and was informed by nurse that a STAT MRI had to be approved by on call radiologist on the weekends. Spoke with on call radiologist Dr. Reynoso who approved request.   -Pending hindfoot MRI for further treatment plan.   -Venous US reviewed, no signs of DVT  -Podiatry will follow

## 2019-12-28 NOTE — PLAN OF CARE
VN note: VN cued into patient's room for introduction. Family at bedside. VN informed patient and family that VN would be working closely along side bedside nurse, PCT, and the rest of the care team and making rounds throughout the shift. Patient verbalized understanding. Allowed time for questions. VN will continue to be available to patient and intervene prn.      Patient informed spoke with Podiatry team, and they will make rounds on him today.      12/28/19 1311   Type of Frequent Check   Type Patient Rounds;Other (see comments)  (VN Rounds)   Safety/Activity   Patient Rounds bed in low position;visualized patient   Safety Promotion/Fall Prevention side rails raised x 2;family to remain at bedside   Activity Management activity adjusted per tolerance;sitting at edge of bed - L2   Positioning   Body Position positioned/repositioned independently   Head of Bed (HOB) HOB at 60-90 degrees   Assessments (Pre/Post)   Level of Consciousness (AVPU) alert

## 2019-12-28 NOTE — PROGRESS NOTES
[facility]  Podiatry  Progress Note    Patient Name: Alessandro Cerna  MRN: [id number]  Admission Date: 12/26/2019  Hospital Length of Stay: 0 days  Attending Physician: Rolo Patel DO  Primary Care Provider: Jamaal Cabrera DO     Subjective:     Interval History:   Patient seen at bedside. NAEON. Denies any pedal or calf pain at this time. Did not receive his MRI of his hindfoot yesterday or this AM. Patient upset because he does not know the plan of care moving forward. Denies N/V/F/C.        Scheduled Meds:   enoxaparin  40 mg Subcutaneous Daily    piperacillin-tazobactam (ZOSYN) IVPB  4.5 g Intravenous Q8H    vancomycin (VANCOCIN) IVPB  1,000 mg Intravenous Q12H     Continuous Infusions:  PRN Meds:acetaminophen, acetaminophen, influenza, melatonin, ondansetron, oxyCODONE, sodium chloride 0.9%, Pharmacy to dose Vancomycin consult **AND** vancomycin - pharmacy to dose    Review of Systems   Constitutional: Negative for appetite change, chills, diaphoresis and fever.   Respiratory: Negative for shortness of breath.    Cardiovascular: Positive for leg swelling.   Gastrointestinal: Negative for nausea and vomiting.   Musculoskeletal: Negative for arthralgias, back pain, gait problem, joint swelling and myalgias.   Skin: Positive for color change. Negative for pallor, rash and wound.   Neurological: Negative for numbness.     Objective:     Vital Signs (Most Recent):  Temp: 98.6 °F (37 °C) (12/28/19 1208)  Pulse: 80 (12/28/19 1550)  Resp: 20 (12/28/19 1208)  BP: 132/69 (12/28/19 1208)  SpO2: 97 % (12/28/19 1208) Vital Signs (24h Range):  Temp:  [97.6 °F (36.4 °C)-99 °F (37.2 °C)] 98.6 °F (37 °C)  Pulse:  [68-86] 80  Resp:  [17-20] 20  SpO2:  [97 %-98 %] 97 %  BP: (103-132)/(60-75) 132/69     Weight: 76.8 kg (169 lb 5 oz)  Body mass index is 26.52 kg/m².    Foot Exam    Left Foot/Ankle      Inspection and Palpation  Left foot ecchymosis: Lateral reafoot.  Tenderness: calcaneus tenderness    Swelling: (Lateral heel)  Skin Exam: cellulitis, abnormal color and erythema; no drainage, no maceration and no ulcer     Neurovascular  Dorsalis pedis: 2+  Posterior tibial: 2+  Saphenous nerve sensation: normal  Tibial nerve sensation: normal  Superficial peroneal nerve sensation: normal  Deep peroneal nerve sensation: normal  Sural nerve sensation: normal            Laboratory:  A1C: No results for input(s): HGBA1C in the last 4320 hours.  CBC:   Recent Labs   Lab 12/28/19  0508   WBC 7.42   RBC 4.39*   HGB 13.4*   HCT 40.2      MCV 92   MCH 30.5   MCHC 33.3     CMP:   Recent Labs   Lab 12/26/19  0857  12/28/19  0508   *   < > 104   CALCIUM 9.5   < > 9.2   ALBUMIN 4.1  --   --    PROT 8.6*  --   --       < > 139   K 3.6   < > 5.4*   CO2 23   < > 27      < > 102   BUN 9   < > 9   CREATININE 1.0   < > 1.0   ALKPHOS 86  --   --    ALT 13  --   --    AST 15  --   --    BILITOT 1.6*  --   --     < > = values in this interval not displayed.     CRP:   Recent Labs   Lab 12/23/19 0818   CRP 39.1*     ESR:   Recent Labs   Lab 12/23/19 0818   SEDRATE 9     Wound Cultures: No results for input(s): LABAERO in the last 4320 hours.  Microbiology Results (last 7 days)     Procedure Component Value Units Date/Time    Blood Culture #1 **CANNOT BE ORDERED STAT** [[id number]] Collected:  12/26/19 0857    Order Status:  Completed Specimen:  Blood from Peripheral, Antecubital, Right Updated:  12/28/19 1412     Blood Culture, Routine No Growth to date      No Growth to date      No Growth to date    Blood Culture #2 **CANNOT BE ORDERED STAT** [[id number]] Collected:  12/26/19 0909    Order Status:  Completed Specimen:  Blood from Peripheral, Forearm, Right Updated:  12/28/19 1412     Blood Culture, Routine No Growth to date      No Growth to date      No Growth to date        Specimen (12h ago, onward)    None          Diagnostic Results:  I have reviewed all pertinent imaging results/findings within the  past 24 hours.   Imaging Results          X-Ray Foot Complete Left (Final result)  Result time 12/26/19 09:26:22    Final result by Tari Montes MD (12/26/19 09:26:22)                 Impression:      Mild soft tissue swelling overlying the lateral malleolus, no acute process seen      Electronically signed by: Tari Montes MD  Date:    12/26/2019  Time:    09:26             Narrative:    EXAMINATION:  XR FOOT COMPLETE 3 VIEW LEFT    CLINICAL HISTORY:  .  Pain in left foot    TECHNIQUE:  AP, lateral and oblique views of the left foot were performed.    COMPARISON:  11/27/2019    FINDINGS:  The alignment and mineralization appear normal.  No fracture seen, no osseous lesions.  No advanced degenerative change.  Soft tissue swelling overlying the lateral malleolus.                                  Clinical Findings:  Erythema and edema noted to lateral rearfoot with mild bogginess noted. Erythema appears to be localized without streaking noted. No drainage or malodor noted. Mild POP. No open wounds, skin appears stable and intact.     12/28 12/27          Assessment/Plan:     * Cellulitis of left foot  -Pt with localized swelling and erythema to lateral left foot, relates to pain along rearfoot and lower leg.   -Continue IV ABX for cellulitis per primary medicine  -Left rearfoot MRI was ordered 12/27 to rule out abscess, signs of deeper infection. Patient still had not received MRI hindfoot 12/28 @ 4:37 PM and I was informed that a STAT MRI order was required. Ordered STAT MRI and was informed by nurse that a STAT MRI had to be approved by on call radiologist on the weekends. Spoke with on call radiologist Dr. Reynoso who approved request.   -Pending hindfoot MRI for further treatment plan.   -Venous US reviewed, no signs of DVT  -Podiatry will follow      Soft tissue infection  As above    Pain in limb  As above        Dayna Gabriela Velazquez MD  Podiatry  [facility]

## 2019-12-28 NOTE — PLAN OF CARE
Patient AAOX4. Plan of care reviewed and patient verbalized understanding. Patient was expressing the want to go home if IV ABT therapy was not needed and come back Monday for MRI. Family expressed concerns about hospital insurance coverage costing more if patient stay, they did not want that especially if it is not needed. Patient did not want to stay at facility until Monday for another MRI. Patient was informed that the IV ABT are needed d/t oral ABT not working and cellulitis and possible osteomyelitis to left foot. Patient then agreed to stay. Bed in lowest position, call light within reach. Fall precautions maintained. Will continue to monitor.

## 2019-12-28 NOTE — PROGRESS NOTES
Pharmacokinetic Assessment Follow Up: IV Vancomycin    Vancomycin serum concentration assessment(s):    The trough level was drawn correctly and can be used to guide therapy at this time. The measurement is within the desired definitive target range of 10 to 15 mcg/mL.    Vancomycin Regimen Plan:    Continue regimen to Vancomycin 1000 mg IV every 12 hours with next serum trough concentration measured at 0830 prior to 0900 dose on 12/30/19.     Drug levels (last 3 results):  Recent Labs   Lab Result Units 12/27/19  1908   Vancomycin-Trough ug/mL 10.5       Pharmacy will continue to follow and monitor vancomycin.    Please contact pharmacy at extension [phone] for questions regarding this assessment.    Thank you for the consult,   Efrain Ryan       Patient brief summary:  Alessandro Cerna is a 64 y.o. male initiated on antimicrobial therapy with IV Vancomycin for treatment of skin & soft tissue infection    The patient's current regimen is Vancomycin 1000 mg IV q12h.    Drug Allergies:   Review of patient's allergies indicates:  No Known Allergies    Actual Body Weight:   78.8 kg    Renal Function:   Estimated Creatinine Clearance: 89.5 mL/min (based on SCr of 0.8 mg/dL).,     Dialysis Method (if applicable):  N/A    CBC (last 72 hours):  Recent Labs   Lab Result Units 12/26/19  0857 12/27/19  0507   WBC K/uL 7.22 9.72   Hemoglobin g/dL 15.0 14.3   Hematocrit % 44.4 42.1   Platelets K/uL 261 260   Gran% % 63.5 69.3   Lymph% % 27.4 20.6   Mono% % 8.6 9.4   Eosinophil% % 0.4 0.5   Basophil% % 0.1 0.2   Differential Method  Automated Automated       Metabolic Panel (last 72 hours):  Recent Labs   Lab Result Units 12/26/19  0857 12/27/19  0506   Sodium mmol/L 138 137   Potassium mmol/L 3.6 4.0   Chloride mmol/L 102 102   CO2 mmol/L 23 24   Glucose mg/dL 136* 95   BUN, Bld mg/dL 9 8   Creatinine mg/dL 1.0 0.8   Albumin g/dL 4.1  --    Total Bilirubin mg/dL 1.6*  --    Alkaline Phosphatase U/L 86  --    AST U/L 15   --    ALT U/L 13  --    Magnesium mg/dL  --  2.0       Vancomycin Administrations:  vancomycin given in the last 96 hours                     vancomycin in dextrose 5 % 1 gram/250 mL IVPB 1,000 mg (mg) 1,000 mg New Bag 12/27/19 2140     1,000 mg New Bag  0845     1,000 mg New Bag 12/26/19 2041    vancomycin (VANCOCIN) 2,250 mg in dextrose 5 % 500 mL IVPB (mg) 2,250 mg New Bag 12/26/19 0917                      Microbiologic Results:  Microbiology Results (last 7 days)       Procedure Component Value Units Date/Time    Blood Culture #1 **CANNOT BE ORDERED STAT** [[id number]] Collected:  12/26/19 0857    Order Status:  Completed Specimen:  Blood from Peripheral, Antecubital, Right Updated:  12/27/19 1412     Blood Culture, Routine No Growth to date      No Growth to date    Blood Culture #2 **CANNOT BE ORDERED STAT** [[id number]] Collected:  12/26/19 0909    Order Status:  Completed Specimen:  Blood from Peripheral, Forearm, Right Updated:  12/27/19 1412     Blood Culture, Routine No Growth to date      No Growth to date

## 2019-12-28 NOTE — SUBJECTIVE & OBJECTIVE
Past Medical History:   Diagnosis Date    Chronic seasonal allergic rhinitis 2017    GERD (gastroesophageal reflux disease)     TB (pulmonary tuberculosis) 1982    Treated [facility] 9 mo therapy.       History reviewed. No pertinent surgical history.    Review of patient's allergies indicates:  No Known Allergies    No current facility-administered medications on file prior to encounter.      Current Outpatient Medications on File Prior to Encounter   Medication Sig    cephALEXin (KEFLEX) 500 MG capsule Take 1 capsule (500 mg total) by mouth every 8 (eight) hours. for 10 days    diclofenac sodium (VOLTAREN) 1 % Gel Apply 2 g topically 4 (four) times daily.    tadalafil (CIALIS) 20 MG Tab Take 1 tablet (20 mg total) by mouth every other day. Take every other day as needed    ibuprofen (ADVIL,MOTRIN) 600 MG tablet Take 1 tablet (600 mg total) by mouth 3 (three) times daily with meals. for 10 days     Family History     Problem Relation (Age of Onset)    Breast cancer Mother    Prostate cancer Father        Tobacco Use    Smoking status: Former Smoker     Packs/day: 1.50     Years: 2.00     Pack years: 3.00     Types: Cigarettes     Last attempt to quit: 4/3/1977     Years since quittin.7    Smokeless tobacco: Never Used   Substance and Sexual Activity    Alcohol use: Yes     Alcohol/week: 3.0 standard drinks     Types: 3 Cans of beer per week     Frequency: 2-3 times a week     Comment: one drink/week    Drug use: No    Sexual activity: Yes     Partners: Female     Review of Systems   Constitutional: Negative for activity change, diaphoresis and fatigue.   HENT: Negative for congestion.    Eyes: Negative for visual disturbance.   Respiratory: Negative for cough, shortness of breath and wheezing.    Cardiovascular: Positive for leg swelling (foot left). Negative for chest pain.   Gastrointestinal: Negative for abdominal distention, constipation, nausea and vomiting.   Genitourinary: Negative for  difficulty urinating and flank pain.   Musculoskeletal: Positive for gait problem (left foot pain) and myalgias (left thigh). Negative for back pain.   Neurological: Negative for dizziness, light-headedness and numbness.   Psychiatric/Behavioral: Negative for agitation. The patient is not nervous/anxious.      Objective:     Vital Signs (Most Recent):  Temp: 98.1 °F (36.7 °C) (12/27/19 1506)  Pulse: 78 (12/27/19 1600)  Resp: 18 (12/27/19 1506)  BP: 111/66 (12/27/19 1506)  SpO2: 98 % (12/27/19 1506) Vital Signs (24h Range):  Temp:  [98 °F (36.7 °C)-98.6 °F (37 °C)] 98.1 °F (36.7 °C)  Pulse:  [63-86] 78  Resp:  [16-18] 18  SpO2:  [95 %-99 %] 98 %  BP: (111-124)/(58-80) 111/66     Weight: 78.8 kg (173 lb 11.6 oz)  Body mass index is 27.21 kg/m².    Physical Exam   Constitutional: He is oriented to person, place, and time. He appears well-developed and well-nourished.   HENT:   Head: Normocephalic and atraumatic.   Eyes: EOM are normal.   Neck: Normal range of motion. Neck supple. No JVD present.   Cardiovascular: Normal rate and regular rhythm.   No murmur heard.  Pulmonary/Chest: Effort normal and breath sounds normal. No respiratory distress.   Abdominal: Soft. He exhibits no distension. There is no tenderness.   Musculoskeletal: Normal range of motion. He exhibits edema (left ankle lower foor area around the calcauous, band like disctribution).   Neurological: He is alert and oriented to person, place, and time.   Skin: Skin is warm and dry.   Psychiatric: He has a normal mood and affect. His behavior is normal. Judgment and thought content normal.   Nursing note and vitals reviewed.        CRANIAL NERVES     CN III, IV, VI   Extraocular motions are normal.        Significant Labs:   Recent Labs   Lab 12/26/19  0857 12/27/19  0507   WBC 7.22 9.72   HGB 15.0 14.3   HCT 44.4 42.1    260     Recent Labs   Lab 12/26/19  0857 12/27/19  0506    137   K 3.6 4.0    102   CO2 23 24   BUN 9 8   CREATININE  1.0 0.8   * 95   CALCIUM 9.5 9.1   MG  --  2.0     Recent Labs   Lab 12/26/19  0857   ALKPHOS 86   ALT 13   AST 15   ALBUMIN 4.1   PROT 8.6*   BILITOT 1.6*      No results for input(s): CPK, CPKMB, MB, TROPONINI in the last 72 hours.  No results for input(s): POCTGLUCOSE in the last 168 hours.  Hemoglobin A1C   Date Value Ref Range Status   04/05/2019 5.1 4.0 - 5.6 % Final     Comment:     ADA Screening Guidelines:  5.7-6.4%  Consistent with prediabetes  >or=6.5%  Consistent with diabetes  High levels of fetal hemoglobin interfere with the HbA1C  assay. Heterozygous hemoglobin variants (HbS, HgC, etc)do  not significantly interfere with this assay.   However, presence of multiple variants may affect accuracy.     11/16/2017 5.0 4.0 - 5.6 % Final     Comment:     According to ADA guidelines, hemoglobin A1c <7.0% represents  optimal control in non-pregnant diabetic patients. Different  metrics may apply to specific patient populations.   Standards of Medical Care in Diabetes-2016.  For the purpose of screening for the presence of diabetes:  <5.7%     Consistent with the absence of diabetes  5.7-6.4%  Consistent with increasing risk for diabetes   (prediabetes)  >or=6.5%  Consistent with diabetes  Currently, no consensus exists for use of hemoglobin A1c  for diagnosis of diabetes for children.  This Hemoglobin A1c assay has significant interference with fetal   hemoglobin   (HbF). The results are invalid for patients with abnormal amounts of   HbF,   including those with known Hereditary Persistence   of Fetal Hemoglobin. Heterozygous hemoglobin variants (HbAS, HbAC,   HbAD, HbAE, HbA2) do not significantly interfere with this assay;   however, presence of multiple variants in a sample may impact the %   interference.       Scheduled Meds:  Continuous Infusions:  As Needed:     Significant Imaging:   US Lower Extremity Veins Left  Narrative: EXAMINATION:  US LOWER EXTREMITY VEINS LEFT    CLINICAL  HISTORY:  femoral thigh pain, R/O DVT;    TECHNIQUE:  Duplex and color flow Doppler evaluation and graded compression of the left lower extremity    COMPARISON:  None    FINDINGS:  Left thigh veins: Common femoral, superficial femoral, popliteal, upper greater saphenous and deep femoral veins demonstrate normal compression, phasic flow and augmentation without evidence for filling defects.    Left calf veins: The visualized calf veins are patent.    Contralateral CFV: The contralateral (right) common femoral vein is patent and free of thrombus.    Miscellaneous: None  Impression: 1. No sonographic evidence of DVT in the LLE.    Electronically signed by: Bishop Ellis  Date:    12/27/2019  Time:    14:56  MRI Foot (Forefoot) Left W W/O Contrast  Narrative: EXAMINATION:  MRI FOOT (FOREFOOT) LEFT W W/O CONTRAST    CLINICAL HISTORY:  Osteomyelitis suspected, foot swelling, diabetic;    TECHNIQUE:  Multiplanar, multisequence MR imaging of the left forefoot with and without the use of intravenous contrast    COMPARISON:  Left foot radiograph dated 12/26/2019 and MRI hindfoot dated 12/12/2019    FINDINGS:  T1 marrow signal of the forefoot appears preserved.  No abnormal osseous edema.  Post-contrast images demonstrate no discrete enhancing collection.  There is abnormal intramuscular edema and enhancement of the flexor digitorum brevis and abductor digiti minimi along plantar foot, incompletely visualized.  Flexor and extensor tendons appear maintained.  No evidence for Arciniega's neuroma.  Impression: No evidence for osteomyelitis.    Abnormal edema and enhancement of the flexor digitorum brevis and abductor digiti minimi along the plantar foot, partially visualized and potentially infectious versus inflammatory in nature.    Electronically signed by: Rusty David  Date:    12/27/2019  Time:    12:30

## 2019-12-28 NOTE — SUBJECTIVE & OBJECTIVE
Subjective:     Interval History:   Patient seen at bedside. GEOFFREY. Denies any pedal or calf pain at this time. Did not receive his MRI of his hindfoot yesterday or this AM. Patient upset because he does not know the plan of care moving forward. Denies N/V/F/C.        Scheduled Meds:   enoxaparin  40 mg Subcutaneous Daily    piperacillin-tazobactam (ZOSYN) IVPB  4.5 g Intravenous Q8H    vancomycin (VANCOCIN) IVPB  1,000 mg Intravenous Q12H     Continuous Infusions:  PRN Meds:acetaminophen, acetaminophen, influenza, melatonin, ondansetron, oxyCODONE, sodium chloride 0.9%, Pharmacy to dose Vancomycin consult **AND** vancomycin - pharmacy to dose    Review of Systems   Constitutional: Negative for appetite change, chills, diaphoresis and fever.   Respiratory: Negative for shortness of breath.    Cardiovascular: Positive for leg swelling.   Gastrointestinal: Negative for nausea and vomiting.   Musculoskeletal: Negative for arthralgias, back pain, gait problem, joint swelling and myalgias.   Skin: Positive for color change. Negative for pallor, rash and wound.   Neurological: Negative for numbness.     Objective:     Vital Signs (Most Recent):  Temp: 98.6 °F (37 °C) (12/28/19 1208)  Pulse: 80 (12/28/19 1550)  Resp: 20 (12/28/19 1208)  BP: 132/69 (12/28/19 1208)  SpO2: 97 % (12/28/19 1208) Vital Signs (24h Range):  Temp:  [97.6 °F (36.4 °C)-99 °F (37.2 °C)] 98.6 °F (37 °C)  Pulse:  [68-86] 80  Resp:  [17-20] 20  SpO2:  [97 %-98 %] 97 %  BP: (103-132)/(60-75) 132/69     Weight: 76.8 kg (169 lb 5 oz)  Body mass index is 26.52 kg/m².    Foot Exam    Left Foot/Ankle      Inspection and Palpation  Left foot ecchymosis: Lateral reafoot.  Tenderness: calcaneus tenderness   Swelling: (Lateral heel)  Skin Exam: cellulitis, abnormal color and erythema; no drainage, no maceration and no ulcer     Neurovascular  Dorsalis pedis: 2+  Posterior tibial: 2+  Saphenous nerve sensation: normal  Tibial nerve sensation:  normal  Superficial peroneal nerve sensation: normal  Deep peroneal nerve sensation: normal  Sural nerve sensation: normal            Laboratory:  A1C: No results for input(s): HGBA1C in the last 4320 hours.  CBC:   Recent Labs   Lab 12/28/19  0508   WBC 7.42   RBC 4.39*   HGB 13.4*   HCT 40.2      MCV 92   MCH 30.5   MCHC 33.3     CMP:   Recent Labs   Lab 12/26/19 0857  12/28/19  0508   *   < > 104   CALCIUM 9.5   < > 9.2   ALBUMIN 4.1  --   --    PROT 8.6*  --   --       < > 139   K 3.6   < > 5.4*   CO2 23   < > 27      < > 102   BUN 9   < > 9   CREATININE 1.0   < > 1.0   ALKPHOS 86  --   --    ALT 13  --   --    AST 15  --   --    BILITOT 1.6*  --   --     < > = values in this interval not displayed.     CRP:   Recent Labs   Lab 12/23/19 0818   CRP 39.1*     ESR:   Recent Labs   Lab 12/23/19 0818   SEDRATE 9     Wound Cultures: No results for input(s): LABAERO in the last 4320 hours.  Microbiology Results (last 7 days)     Procedure Component Value Units Date/Time    Blood Culture #1 **CANNOT BE ORDERED STAT** [[id number]] Collected:  12/26/19 0857    Order Status:  Completed Specimen:  Blood from Peripheral, Antecubital, Right Updated:  12/28/19 1412     Blood Culture, Routine No Growth to date      No Growth to date      No Growth to date    Blood Culture #2 **CANNOT BE ORDERED STAT** [[id number]] Collected:  12/26/19 0909    Order Status:  Completed Specimen:  Blood from Peripheral, Forearm, Right Updated:  12/28/19 1412     Blood Culture, Routine No Growth to date      No Growth to date      No Growth to date        Specimen (12h ago, onward)    None          Diagnostic Results:  I have reviewed all pertinent imaging results/findings within the past 24 hours.   Imaging Results          X-Ray Foot Complete Left (Final result)  Result time 12/26/19 09:26:22    Final result by Tari Montes MD (12/26/19 09:26:22)                 Impression:      Mild soft tissue swelling  overlying the lateral malleolus, no acute process seen      Electronically signed by: Tari Montes MD  Date:    12/26/2019  Time:    09:26             Narrative:    EXAMINATION:  XR FOOT COMPLETE 3 VIEW LEFT    CLINICAL HISTORY:  .  Pain in left foot    TECHNIQUE:  AP, lateral and oblique views of the left foot were performed.    COMPARISON:  11/27/2019    FINDINGS:  The alignment and mineralization appear normal.  No fracture seen, no osseous lesions.  No advanced degenerative change.  Soft tissue swelling overlying the lateral malleolus.                                  Clinical Findings:  Erythema and edema noted to lateral rearfoot with mild bogginess noted. Erythema appears to be localized without streaking noted. No drainage or malodor noted. Mild POP. No open wounds, skin appears stable and intact.     12/28 12/27

## 2019-12-28 NOTE — ASSESSMENT & PLAN NOTE
Secondary to faciitis vs inflammation vs infection.  Symptomatic tx.  tx underlying cause  12/27 no DVT on Us

## 2019-12-28 NOTE — SUBJECTIVE & OBJECTIVE
Interval History: pt to have rear foot MRI today, and for now continue current abx    Review of Systems   Constitutional: Negative for activity change and chills.   Respiratory: Negative for cough and shortness of breath.    Cardiovascular: Negative for chest pain.   Musculoskeletal: Positive for arthralgias (left foot-site of erythema).   Neurological: Negative for dizziness and numbness.   Psychiatric/Behavioral: Negative for agitation. The patient is not nervous/anxious.      Objective:     Vital Signs (Most Recent):  Temp: 98.8 °F (37.1 °C) (12/28/19 1636)  Pulse: 77 (12/28/19 1636)  Resp: 20 (12/28/19 1636)  BP: 137/76 (12/28/19 1636)  SpO2: 98 % (12/28/19 1636) Vital Signs (24h Range):  Temp:  [97.6 °F (36.4 °C)-99 °F (37.2 °C)] 98.8 °F (37.1 °C)  Pulse:  [68-86] 77  Resp:  [17-20] 20  SpO2:  [97 %-98 %] 98 %  BP: (103-137)/(60-76) 137/76     Weight: 76.8 kg (169 lb 5 oz)  Body mass index is 26.52 kg/m².    Intake/Output Summary (Last 24 hours) at 12/28/2019 1719  Last data filed at 12/28/2019 1300  Gross per 24 hour   Intake 545 ml   Output 2051 ml   Net -1506 ml      Physical Exam   Constitutional: He is oriented to person, place, and time. He appears well-developed and well-nourished.   HENT:   Head: Normocephalic and atraumatic.   Eyes: EOM are normal.   Neck: Normal range of motion. Neck supple. No JVD present.   Cardiovascular: Normal rate.   Pulmonary/Chest: Effort normal.   Abdominal: Soft.   Musculoskeletal: Normal range of motion. He exhibits edema (left rear foot).   Neurological: He is alert and oriented to person, place, and time.   Psychiatric: He has a normal mood and affect. His behavior is normal. Judgment and thought content normal.   Nursing note and vitals reviewed.      Significant Labs:   Recent Labs   Lab 12/26/19  0857 12/27/19  0507 12/28/19  0508   WBC 7.22 9.72 7.42   HGB 15.0 14.3 13.4*   HCT 44.4 42.1 40.2    260 252     Recent Labs   Lab 12/26/19  0857 12/27/19  0503  12/28/19  0508    137 139   K 3.6 4.0 5.4*    102 102   CO2 23 24 27   BUN 9 8 9   CREATININE 1.0 0.8 1.0   * 95 104   CALCIUM 9.5 9.1 9.2   MG  --  2.0 2.2     Recent Labs   Lab 12/26/19  0857   ALKPHOS 86   ALT 13   AST 15   ALBUMIN 4.1   PROT 8.6*   BILITOT 1.6*      No results for input(s): CPK, CPKMB, MB, TROPONINI in the last 72 hours.  No results for input(s): POCTGLUCOSE in the last 168 hours.  Hemoglobin A1C   Date Value Ref Range Status   04/05/2019 5.1 4.0 - 5.6 % Final     Comment:     ADA Screening Guidelines:  5.7-6.4%  Consistent with prediabetes  >or=6.5%  Consistent with diabetes  High levels of fetal hemoglobin interfere with the HbA1C  assay. Heterozygous hemoglobin variants (HbS, HgC, etc)do  not significantly interfere with this assay.   However, presence of multiple variants may affect accuracy.     11/16/2017 5.0 4.0 - 5.6 % Final     Comment:     According to ADA guidelines, hemoglobin A1c <7.0% represents  optimal control in non-pregnant diabetic patients. Different  metrics may apply to specific patient populations.   Standards of Medical Care in Diabetes-2016.  For the purpose of screening for the presence of diabetes:  <5.7%     Consistent with the absence of diabetes  5.7-6.4%  Consistent with increasing risk for diabetes   (prediabetes)  >or=6.5%  Consistent with diabetes  Currently, no consensus exists for use of hemoglobin A1c  for diagnosis of diabetes for children.  This Hemoglobin A1c assay has significant interference with fetal   hemoglobin   (HbF). The results are invalid for patients with abnormal amounts of   HbF,   including those with known Hereditary Persistence   of Fetal Hemoglobin. Heterozygous hemoglobin variants (HbAS, HbAC,   HbAD, HbAE, HbA2) do not significantly interfere with this assay;   however, presence of multiple variants in a sample may impact the %   interference.       Scheduled Meds:   enoxaparin  40 mg Subcutaneous Daily     piperacillin-tazobactam (ZOSYN) IVPB  4.5 g Intravenous Q8H    vancomycin (VANCOCIN) IVPB  1,000 mg Intravenous Q12H     Continuous Infusions:  As Needed: acetaminophen, acetaminophen, influenza, melatonin, ondansetron, oxyCODONE, sodium chloride 0.9%, Pharmacy to dose Vancomycin consult **AND** vancomycin - pharmacy to dose  Significant Imaging:   US Lower Extremity Veins Left  Narrative: EXAMINATION:  US LOWER EXTREMITY VEINS LEFT    CLINICAL HISTORY:  femoral thigh pain, R/O DVT;    TECHNIQUE:  Duplex and color flow Doppler evaluation and graded compression of the left lower extremity    COMPARISON:  None    FINDINGS:  Left thigh veins: Common femoral, superficial femoral, popliteal, upper greater saphenous and deep femoral veins demonstrate normal compression, phasic flow and augmentation without evidence for filling defects.    Left calf veins: The visualized calf veins are patent.    Contralateral CFV: The contralateral (right) common femoral vein is patent and free of thrombus.    Miscellaneous: None  Impression: 1. No sonographic evidence of DVT in the LLE.    Electronically signed by: Bishop Ellis  Date:    12/27/2019  Time:    14:56  MRI Foot (Forefoot) Left W W/O Contrast  Narrative: EXAMINATION:  MRI FOOT (FOREFOOT) LEFT W W/O CONTRAST    CLINICAL HISTORY:  Osteomyelitis suspected, foot swelling, diabetic;    TECHNIQUE:  Multiplanar, multisequence MR imaging of the left forefoot with and without the use of intravenous contrast    COMPARISON:  Left foot radiograph dated 12/26/2019 and MRI hindfoot dated 12/12/2019    FINDINGS:  T1 marrow signal of the forefoot appears preserved.  No abnormal osseous edema.  Post-contrast images demonstrate no discrete enhancing collection.  There is abnormal intramuscular edema and enhancement of the flexor digitorum brevis and abductor digiti minimi along plantar foot, incompletely visualized.  Flexor and extensor tendons appear maintained.  No evidence for Arciniega's  neuroma.  Impression: No evidence for osteomyelitis.    Abnormal edema and enhancement of the flexor digitorum brevis and abductor digiti minimi along the plantar foot, partially visualized and potentially infectious versus inflammatory in nature.    Electronically signed by: Rusty David  Date:    12/27/2019  Time:    12:30

## 2019-12-29 ENCOUNTER — ANESTHESIA EVENT (OUTPATIENT)
Dept: SURGERY | Facility: HOSPITAL | Age: 64
DRG: 581 | End: 2019-12-29
Payer: COMMERCIAL

## 2019-12-29 LAB
ANION GAP SERPL CALC-SCNC: 9 MMOL/L (ref 8–16)
BASOPHILS # BLD AUTO: 0.03 K/UL (ref 0–0.2)
BASOPHILS NFR BLD: 0.5 % (ref 0–1.9)
BUN SERPL-MCNC: 10 MG/DL (ref 8–23)
CALCIUM SERPL-MCNC: 9.2 MG/DL (ref 8.7–10.5)
CHLORIDE SERPL-SCNC: 102 MMOL/L (ref 95–110)
CO2 SERPL-SCNC: 29 MMOL/L (ref 23–29)
CREAT SERPL-MCNC: 1 MG/DL (ref 0.5–1.4)
DIFFERENTIAL METHOD: ABNORMAL
EOSINOPHIL # BLD AUTO: 0.2 K/UL (ref 0–0.5)
EOSINOPHIL NFR BLD: 2.5 % (ref 0–8)
ERYTHROCYTE [DISTWIDTH] IN BLOOD BY AUTOMATED COUNT: 13.4 % (ref 11.5–14.5)
EST. GFR  (AFRICAN AMERICAN): >60 ML/MIN/1.73 M^2
EST. GFR  (NON AFRICAN AMERICAN): >60 ML/MIN/1.73 M^2
GLUCOSE SERPL-MCNC: 102 MG/DL (ref 70–110)
HCT VFR BLD AUTO: 40.4 % (ref 40–54)
HGB BLD-MCNC: 13.6 G/DL (ref 14–18)
LYMPHOCYTES # BLD AUTO: 2 K/UL (ref 1–4.8)
LYMPHOCYTES NFR BLD: 32.9 % (ref 18–48)
MAGNESIUM SERPL-MCNC: 2.2 MG/DL (ref 1.6–2.6)
MCH RBC QN AUTO: 30.9 PG (ref 27–31)
MCHC RBC AUTO-ENTMCNC: 33.7 G/DL (ref 32–36)
MCV RBC AUTO: 92 FL (ref 82–98)
MONOCYTES # BLD AUTO: 0.8 K/UL (ref 0.3–1)
MONOCYTES NFR BLD: 12.9 % (ref 4–15)
NEUTROPHILS # BLD AUTO: 3.1 K/UL (ref 1.8–7.7)
NEUTROPHILS NFR BLD: 51.2 % (ref 38–73)
PLATELET # BLD AUTO: 277 K/UL (ref 150–350)
PMV BLD AUTO: 9.1 FL (ref 9.2–12.9)
POTASSIUM SERPL-SCNC: 4.2 MMOL/L (ref 3.5–5.1)
RBC # BLD AUTO: 4.4 M/UL (ref 4.6–6.2)
SODIUM SERPL-SCNC: 140 MMOL/L (ref 136–145)
WBC # BLD AUTO: 6.11 K/UL (ref 3.9–12.7)

## 2019-12-29 PROCEDURE — 36415 COLL VENOUS BLD VENIPUNCTURE: CPT

## 2019-12-29 PROCEDURE — 96376 TX/PRO/DX INJ SAME DRUG ADON: CPT

## 2019-12-29 PROCEDURE — 99233 PR SUBSEQUENT HOSPITAL CARE,LEVL III: ICD-10-PCS | Mod: ,,, | Performed by: PODIATRIST

## 2019-12-29 PROCEDURE — 99233 SBSQ HOSP IP/OBS HIGH 50: CPT | Mod: ,,, | Performed by: PODIATRIST

## 2019-12-29 PROCEDURE — 11000001 HC ACUTE MED/SURG PRIVATE ROOM

## 2019-12-29 PROCEDURE — 83735 ASSAY OF MAGNESIUM: CPT

## 2019-12-29 PROCEDURE — 85025 COMPLETE CBC W/AUTO DIFF WBC: CPT

## 2019-12-29 PROCEDURE — 80048 BASIC METABOLIC PNL TOTAL CA: CPT

## 2019-12-29 PROCEDURE — 96372 THER/PROPH/DIAG INJ SC/IM: CPT

## 2019-12-29 PROCEDURE — 63600175 PHARM REV CODE 636 W HCPCS: Performed by: HOSPITALIST

## 2019-12-29 RX ADMIN — PIPERACILLIN SODIUM AND TAZOBACTAM SODIUM 4.5 G: 4; .5 INJECTION, POWDER, LYOPHILIZED, FOR SOLUTION INTRAVENOUS at 01:12

## 2019-12-29 RX ADMIN — VANCOMYCIN HYDROCHLORIDE 1000 MG: 1 INJECTION, POWDER, LYOPHILIZED, FOR SOLUTION INTRAVENOUS at 08:12

## 2019-12-29 RX ADMIN — PIPERACILLIN SODIUM AND TAZOBACTAM SODIUM 4.5 G: 4; .5 INJECTION, POWDER, LYOPHILIZED, FOR SOLUTION INTRAVENOUS at 10:12

## 2019-12-29 RX ADMIN — PIPERACILLIN SODIUM AND TAZOBACTAM SODIUM 4.5 G: 4; .5 INJECTION, POWDER, LYOPHILIZED, FOR SOLUTION INTRAVENOUS at 05:12

## 2019-12-29 RX ADMIN — ENOXAPARIN SODIUM 40 MG: 100 INJECTION SUBCUTANEOUS at 05:12

## 2019-12-29 RX ADMIN — VANCOMYCIN HYDROCHLORIDE 1000 MG: 1 INJECTION, POWDER, LYOPHILIZED, FOR SOLUTION INTRAVENOUS at 09:12

## 2019-12-29 NOTE — ANESTHESIA PREPROCEDURE EVALUATION
12/29/2019  Alessandro Cerna is a 64 y.o., male. PMH GERD on no medication, TB in 1982, S/P treatment, and cellulitis of the L foot presenting for I&D of the foot.       03/2018 stress echo  1. The EKG portion of this study is negative for ischemia at a moderate workload, and peak heart rate of 150 bpm (94% of predicted).   2. Exercise capacity is average.   3. Blood pressure response to exercise was normal (Presenting BP: 109/75 Peak BP: 140/67).   4. No significant arrhythmias were present.   5. There were no symptoms of chest discomfort or significant dyspnea throughout the protocol.   6. The Duke treadmill score was 9 suggesting a low probability for future cardiovascular events.     1 - Normal left ventricular systolic function (EF 60-65%).     2 - Normal left ventricular diastolic function.     3 - Normal right ventricular systolic function .     4 - The estimated PA systolic pressure is greater than 19 mmHg.     Anesthesia Evaluation    I have reviewed the Patient Summary Reports.    I have reviewed the Nursing Notes.   I have reviewed the Medications.     Review of Systems  Anesthesia Hx:  No problems with previous Anesthesia  History of prior surgery of interest to airway management or planning: Denies Family Hx of Anesthesia complications.   Denies Personal Hx of Anesthesia complications.   Hematology/Oncology:  Hematology Normal   Oncology Normal     EENT/Dental:EENT/Dental Normal   Cardiovascular:  Cardiovascular Normal Exercise tolerance: good     Pulmonary:  Pulmonary Normal    Renal/:  Renal/ Normal     Hepatic/GI:   GERD    Musculoskeletal:  Musculoskeletal Normal    Neurological:  Neurology Normal    Psych:  Psychiatric Normal           Physical Exam  General:  Well nourished    Airway/Jaw/Neck:  Airway Findings: Mouth Opening: Normal Tongue: Normal  General Airway Assessment: Adult   Mallampati: II  Improves to II with phonation.  TM Distance: < 4 cm     Eyes/Ears/Nose:  EYES/EARS/NOSE FINDINGS: Normal   Dental:  Dental Findings: In tact   Chest/Lungs:  Chest/Lungs Clear    Heart/Vascular:  Heart Findings: Normal       Mental Status:  Mental Status Findings: Normal      Lab Results   Component Value Date    WBC 6.05 12/30/2019    HGB 13.6 (L) 12/30/2019    HCT 41.1 12/30/2019    MCV 92 12/30/2019     12/30/2019         Chemistry        Component Value Date/Time     12/30/2019 0455    K 3.9 12/30/2019 0455     12/30/2019 0455    CO2 25 12/30/2019 0455    BUN 11 12/30/2019 0455    CREATININE 1.0 12/30/2019 0455    GLU 95 12/30/2019 0455        Component Value Date/Time    CALCIUM 9.1 12/30/2019 0455    ALKPHOS 86 12/26/2019 0857    AST 15 12/26/2019 0857    ALT 13 12/26/2019 0857    BILITOT 1.6 (H) 12/26/2019 0857    ESTGFRAFRICA >60 12/30/2019 0455    EGFRNONAA >60 12/30/2019 0455              Anesthesia Plan  Type of Anesthesia, risks & benefits discussed:  Anesthesia Type:  regional, MAC  Patient's Preference:   Intra-op Monitoring Plan: standard ASA monitors  Intra-op Monitoring Plan Comments:   Post Op Pain Control Plan: per primary service following discharge from PACU  Post Op Pain Control Plan Comments:   Induction:   IV  Beta Blocker:  Patient is not currently on a Beta-Blocker (No further documentation required).       Informed Consent: Patient understands risks and agrees with Anesthesia plan.  Questions answered. Anesthesia consent signed with patient.  ASA Score: 3     Day of Surgery Review of History & Physical:  There are no significant changes.          Ready For Surgery From Anesthesia Perspective.

## 2019-12-29 NOTE — ASSESSMENT & PLAN NOTE
Secondary to faciitis vs inflammation vs infection.  Symptomatic tx.  tx underlying cause  12/27 no DVT on Us  12/28 improving slowly  12/29 controlled

## 2019-12-29 NOTE — PLAN OF CARE
Plan:   -Patient scheduled for I&D left foot with bone biopsy of left calcaneus tomorrow 12/30 with Dr. Maximo QUINTANILLA. Tentatively scheduled for noon.  -NPO past midnight       MRI left hindfoot:   There is a thin peripherally enhancing fluid collection measuring approximately 4.3 cm transverse by 0.8 cm cranial caudal by 2.9 cm AP along the plantar margins of the calcaneus, just deep to the plantar fascia with apparent involvement of the substance of the plantar fascia.  Intramuscular edema and enhancement noted in the adjacent plantar musculature.  Finding is concerning for abscess with suggestion of a sinus tract extending towards the skin surface along the lateral margins of the hindfoot.  They is ill-defined soft tissue edema and enhancement in the surrounding subcutaneous soft tissues, suggestive of phlegmonous reaction.    Mild nonspecific marrow edema noted throughout the calcaneal tuberosity which may be reactive in nature however early osteomyelitis can not be excluded given above findings.     Findings concerning for mild tenosynovitis of the peroneus longus and brevis tendons, possibly infectious in nature.

## 2019-12-29 NOTE — ASSESSMENT & PLAN NOTE
12/27 consulted podiatry for help in management  12/28 DPM on board.  MRI rear foot ordered  12/29 abnormal MRI rear foot, see above

## 2019-12-29 NOTE — SUBJECTIVE & OBJECTIVE
Interval History: discussed with pt the results of MRI, awaiting ID input as well    Review of Systems   Constitutional: Negative for activity change, chills, fatigue and fever.   Musculoskeletal: Positive for arthralgias (left foot).   Neurological: Negative for dizziness and light-headedness.   Psychiatric/Behavioral: Negative for agitation. The patient is nervous/anxious.      Objective:     Vital Signs (Most Recent):  Temp: 98.2 °F (36.8 °C) (12/29/19 1206)  Pulse: 76 (12/29/19 1206)  Resp: 20 (12/29/19 1206)  BP: 126/82 (12/29/19 1206)  SpO2: 98 % (12/29/19 1206) Vital Signs (24h Range):  Temp:  [98 °F (36.7 °C)-98.9 °F (37.2 °C)] 98.2 °F (36.8 °C)  Pulse:  [69-84] 76  Resp:  [18-20] 20  SpO2:  [98 %-100 %] 98 %  BP: (112-137)/(71-82) 126/82     Weight: 76.8 kg (169 lb 5 oz)  Body mass index is 26.52 kg/m².    Intake/Output Summary (Last 24 hours) at 12/29/2019 1504  Last data filed at 12/29/2019 1340  Gross per 24 hour   Intake 1390 ml   Output 2100 ml   Net -710 ml      Physical Exam   Constitutional: He is oriented to person, place, and time. He appears well-developed and well-nourished.   HENT:   Head: Normocephalic and atraumatic.   Eyes: EOM are normal.   Neck: Neck supple.   Cardiovascular: Normal rate.   Pulmonary/Chest: Effort normal.   Abdominal: There is no guarding.   Musculoskeletal: Normal range of motion.   Neurological: He is alert and oriented to person, place, and time.   Psychiatric: He has a normal mood and affect. His behavior is normal. Judgment and thought content normal.       Significant Labs:   Recent Labs   Lab 12/27/19  0507 12/28/19  0508 12/29/19  0546   WBC 9.72 7.42 6.11   HGB 14.3 13.4* 13.6*   HCT 42.1 40.2 40.4    252 277     Recent Labs   Lab 12/27/19  0506 12/28/19  0508 12/29/19  0546    139 140   K 4.0 5.4* 4.2    102 102   CO2 24 27 29   BUN 8 9 10   CREATININE 0.8 1.0 1.0   GLU 95 104 102   CALCIUM 9.1 9.2 9.2   MG 2.0 2.2 2.2     Recent Labs   Lab  12/26/19  0857   ALKPHOS 86   ALT 13   AST 15   ALBUMIN 4.1   PROT 8.6*   BILITOT 1.6*      No results for input(s): CPK, CPKMB, MB, TROPONINI in the last 72 hours.  No results for input(s): POCTGLUCOSE in the last 168 hours.  Hemoglobin A1C   Date Value Ref Range Status   04/05/2019 5.1 4.0 - 5.6 % Final     Comment:     ADA Screening Guidelines:  5.7-6.4%  Consistent with prediabetes  >or=6.5%  Consistent with diabetes  High levels of fetal hemoglobin interfere with the HbA1C  assay. Heterozygous hemoglobin variants (HbS, HgC, etc)do  not significantly interfere with this assay.   However, presence of multiple variants may affect accuracy.     11/16/2017 5.0 4.0 - 5.6 % Final     Comment:     According to ADA guidelines, hemoglobin A1c <7.0% represents  optimal control in non-pregnant diabetic patients. Different  metrics may apply to specific patient populations.   Standards of Medical Care in Diabetes-2016.  For the purpose of screening for the presence of diabetes:  <5.7%     Consistent with the absence of diabetes  5.7-6.4%  Consistent with increasing risk for diabetes   (prediabetes)  >or=6.5%  Consistent with diabetes  Currently, no consensus exists for use of hemoglobin A1c  for diagnosis of diabetes for children.  This Hemoglobin A1c assay has significant interference with fetal   hemoglobin   (HbF). The results are invalid for patients with abnormal amounts of   HbF,   including those with known Hereditary Persistence   of Fetal Hemoglobin. Heterozygous hemoglobin variants (HbAS, HbAC,   HbAD, HbAE, HbA2) do not significantly interfere with this assay;   however, presence of multiple variants in a sample may impact the %   interference.       Scheduled Meds:   enoxaparin  40 mg Subcutaneous Daily    piperacillin-tazobactam (ZOSYN) IVPB  4.5 g Intravenous Q8H    vancomycin (VANCOCIN) IVPB  1,000 mg Intravenous Q12H     Continuous Infusions:  As Needed: acetaminophen, acetaminophen, influenza, melatonin,  ondansetron, oxyCODONE, sodium chloride 0.9%, Pharmacy to dose Vancomycin consult **AND** vancomycin - pharmacy to dose    Significant Imaging:   MRI Foot (Hindfoot) Left W W/O Contrast  Narrative: EXAMINATION:  MRI FOOT (HINDFOOT) LEFT W W/O CONTRAST    CLINICAL HISTORY:  suspected abscess left foot;    TECHNIQUE:  Multiplanar/multisequence MRI of the left hindfoot was performed prior to and following administration of 8 cc IV Gadavist.    COMPARISON:  None    FINDINGS:  There is a thin peripherally enhancing fluid collection measuring approximately 4.3 cm transverse by 0.8 cm cranial caudal by 2.9 cm AP along the plantar margins of the calcaneus, just deep to the plantar fascia with apparent involvement of the substance of the plantar fascia.  Intramuscular edema and enhancement noted in the adjacent plantar musculature.  Finding is concerning for abscess with suggestion of a sinus tract extending towards the skin surface along the lateral margins of the hindfoot.  They is ill-defined soft tissue edema and enhancement in the surrounding subcutaneous soft tissues, suggestive of phlegmonous reaction.    Mild nonspecific marrow edema noted throughout the calcaneal tuberosity which may be reactive in nature however early osteomyelitis can not be excluded given above findings.  No joint effusion demonstrated to suggest septic arthritis.    Mild fluid distention of the tendon sheaths of the peroneus longus and brevis tendons noted just distal to the lateral malleolus, near the level of the suspected phlegmonous reaction in the subcutaneous soft tissues.  Findings are concerning for tenosynovitis, possibly infectious in nature.  Remaining visualized tendinous and ligamentous structures appear grossly intact noting that this examination is not tailored for evaluation of internal derangement of the hindfoot.    Sinus tarsi and tarsal tunnel structures appear within normal limits.  Impression: 1. Findings concerning for  abscess along the plantar surface of the calcaneus and deep to the plantar fascia as detailed above with possible involvement of the substance of the plantar fascia.  There is also suggestion of a sinus tract extending towards the skin surface along the lateral margins of the ankle with surrounding phlegmonous reaction.  2. Nonspecific marrow edema throughout the tuberosity of the calcaneus which may be reactive in nature.  Early osteomyelitis can not be excluded.  3. Findings concerning for mild tenosynovitis of the peroneus longus and brevis tendons, possibly infectious in nature.  This report was flagged in Epic as abnormal.    Electronically signed by: Jeff Lopez MD  Date:    12/29/2019  Time:    08:09

## 2019-12-29 NOTE — ASSESSMENT & PLAN NOTE
Possible from the plantar fascitis vs from the injection.  Pt had couple of rounds of oral abx, not clearing the infection/edema totally.    Will place on iv vanc and zosyn.  Will order MRI. Although he had one a month ago, but no resolution of the symptoms with abx.  Blood cultures ordered in ED, doubt goof results given the abx recent use  Might consider ID consult if needed or podiatry consult    12/27 no osteo on mri  Discuss with DPM  12/28 awaiting MRI rear foot.  Continue abx,  Neg cultures so far  12/29 MRI of rear foot concerning for early osteo,   Consulting ID, F/U with DPM

## 2019-12-29 NOTE — PLAN OF CARE
VN note: VN unable to cue into room. Monitor offline.     12/29/19 104   Type of Frequent Check   Type Patient Rounds;Other (see comments)  (VN Rounds-Monitor offline)

## 2019-12-29 NOTE — PROGRESS NOTES
[facility] Hospital Medicine  Progress Note    Patient Name: Alessandro Cerna  MRN: [id number]  Patient Class: OP- Observation   Admission Date: 12/26/2019  Length of Stay: 0 days  Attending Physician: Rolo Patel DO  Primary Care Provider: Jamaal Cabrera DO        Subjective:     Principal Problem:Cellulitis of left foot        HPI:  The patient is a 64 years old male pt with PMH significant for GERD on no medication, TB in 1982, S/P treatment, otherwise the pt is healthy.  The pt presented to ED with ongoing left ankle calcanuous edema and pain.  As per pt, he had plantar fascitis of the left foot, for which he was seen and evaluated by a (podiatrist) and had steroid injection at the medial part of the foot on 11/20, since then he had swelling and edema, pain in the left foot, he was seen by the MD who performed the injection and was told it is non infectiouis process.   The pt came to ED on 11/27 and was given 5 days worth of antibiotics Clindamycin (proably as pt recalls) and then was seen by his PCP who prescribed keflex for 5 days, he had some resolution but edema is back.  Pt also had MRI a month ago showing edema of the area of the fascia and possible the muscle.    The pt denies having medication at home, he is healthy otherwise.  Denies smokinh  He is a social drinker a drink or two a week.  Family hx non contributory, no DM that he knows of .    Pt to be placed in obs, start broad spectrum ABX and MRI of the left foot    Overview/Hospital Course:  12/27 pt doing ok, MRI no osteo, discussing with podiatry, and pt had pain in left tigh , no DVT on US  12/28 pt seen, he is clinically stable awaiting the rearfoot MRI to be completed, appreciate podiatry help in setting it up today. Cultures still NGTD. Will continue IV abx, awaiting MRI and recs by podiatry.  12/29 pt seen, he still feeling ok, no fever, the pt need to be evaluated by podiatry and ID for the possible fluid  collection/abscess/ start of osteo in th rear left foot    Interval History: discussed with pt the results of MRI, awaiting ID input as well    Review of Systems   Constitutional: Negative for activity change, chills, fatigue and fever.   Musculoskeletal: Positive for arthralgias (left foot).   Neurological: Negative for dizziness and light-headedness.   Psychiatric/Behavioral: Negative for agitation. The patient is nervous/anxious.      Objective:     Vital Signs (Most Recent):  Temp: 98.2 °F (36.8 °C) (12/29/19 1206)  Pulse: 76 (12/29/19 1206)  Resp: 20 (12/29/19 1206)  BP: 126/82 (12/29/19 1206)  SpO2: 98 % (12/29/19 1206) Vital Signs (24h Range):  Temp:  [98 °F (36.7 °C)-98.9 °F (37.2 °C)] 98.2 °F (36.8 °C)  Pulse:  [69-84] 76  Resp:  [18-20] 20  SpO2:  [98 %-100 %] 98 %  BP: (112-137)/(71-82) 126/82     Weight: 76.8 kg (169 lb 5 oz)  Body mass index is 26.52 kg/m².    Intake/Output Summary (Last 24 hours) at 12/29/2019 1504  Last data filed at 12/29/2019 1340  Gross per 24 hour   Intake 1390 ml   Output 2100 ml   Net -710 ml      Physical Exam   Constitutional: He is oriented to person, place, and time. He appears well-developed and well-nourished.   HENT:   Head: Normocephalic and atraumatic.   Eyes: EOM are normal.   Neck: Neck supple.   Cardiovascular: Normal rate.   Pulmonary/Chest: Effort normal.   Abdominal: There is no guarding.   Musculoskeletal: Normal range of motion.   Neurological: He is alert and oriented to person, place, and time.   Psychiatric: He has a normal mood and affect. His behavior is normal. Judgment and thought content normal.       Significant Labs:   Recent Labs   Lab 12/27/19  0507 12/28/19  0508 12/29/19  0546   WBC 9.72 7.42 6.11   HGB 14.3 13.4* 13.6*   HCT 42.1 40.2 40.4    252 277     Recent Labs   Lab 12/27/19  0506 12/28/19  0508 12/29/19  0546    139 140   K 4.0 5.4* 4.2    102 102   CO2 24 27 29   BUN 8 9 10   CREATININE 0.8 1.0 1.0   GLU 95 104 102    CALCIUM 9.1 9.2 9.2   MG 2.0 2.2 2.2     Recent Labs   Lab 12/26/19  0857   ALKPHOS 86   ALT 13   AST 15   ALBUMIN 4.1   PROT 8.6*   BILITOT 1.6*      No results for input(s): CPK, CPKMB, MB, TROPONINI in the last 72 hours.  No results for input(s): POCTGLUCOSE in the last 168 hours.  Hemoglobin A1C   Date Value Ref Range Status   04/05/2019 5.1 4.0 - 5.6 % Final     Comment:     ADA Screening Guidelines:  5.7-6.4%  Consistent with prediabetes  >or=6.5%  Consistent with diabetes  High levels of fetal hemoglobin interfere with the HbA1C  assay. Heterozygous hemoglobin variants (HbS, HgC, etc)do  not significantly interfere with this assay.   However, presence of multiple variants may affect accuracy.     11/16/2017 5.0 4.0 - 5.6 % Final     Comment:     According to ADA guidelines, hemoglobin A1c <7.0% represents  optimal control in non-pregnant diabetic patients. Different  metrics may apply to specific patient populations.   Standards of Medical Care in Diabetes-2016.  For the purpose of screening for the presence of diabetes:  <5.7%     Consistent with the absence of diabetes  5.7-6.4%  Consistent with increasing risk for diabetes   (prediabetes)  >or=6.5%  Consistent with diabetes  Currently, no consensus exists for use of hemoglobin A1c  for diagnosis of diabetes for children.  This Hemoglobin A1c assay has significant interference with fetal   hemoglobin   (HbF). The results are invalid for patients with abnormal amounts of   HbF,   including those with known Hereditary Persistence   of Fetal Hemoglobin. Heterozygous hemoglobin variants (HbAS, HbAC,   HbAD, HbAE, HbA2) do not significantly interfere with this assay;   however, presence of multiple variants in a sample may impact the %   interference.       Scheduled Meds:   enoxaparin  40 mg Subcutaneous Daily    piperacillin-tazobactam (ZOSYN) IVPB  4.5 g Intravenous Q8H    vancomycin (VANCOCIN) IVPB  1,000 mg Intravenous Q12H     Continuous  Infusions:  As Needed: acetaminophen, acetaminophen, influenza, melatonin, ondansetron, oxyCODONE, sodium chloride 0.9%, Pharmacy to dose Vancomycin consult **AND** vancomycin - pharmacy to dose    Significant Imaging:   MRI Foot (Hindfoot) Left W W/O Contrast  Narrative: EXAMINATION:  MRI FOOT (HINDFOOT) LEFT W W/O CONTRAST    CLINICAL HISTORY:  suspected abscess left foot;    TECHNIQUE:  Multiplanar/multisequence MRI of the left hindfoot was performed prior to and following administration of 8 cc IV Gadavist.    COMPARISON:  None    FINDINGS:  There is a thin peripherally enhancing fluid collection measuring approximately 4.3 cm transverse by 0.8 cm cranial caudal by 2.9 cm AP along the plantar margins of the calcaneus, just deep to the plantar fascia with apparent involvement of the substance of the plantar fascia.  Intramuscular edema and enhancement noted in the adjacent plantar musculature.  Finding is concerning for abscess with suggestion of a sinus tract extending towards the skin surface along the lateral margins of the hindfoot.  They is ill-defined soft tissue edema and enhancement in the surrounding subcutaneous soft tissues, suggestive of phlegmonous reaction.    Mild nonspecific marrow edema noted throughout the calcaneal tuberosity which may be reactive in nature however early osteomyelitis can not be excluded given above findings.  No joint effusion demonstrated to suggest septic arthritis.    Mild fluid distention of the tendon sheaths of the peroneus longus and brevis tendons noted just distal to the lateral malleolus, near the level of the suspected phlegmonous reaction in the subcutaneous soft tissues.  Findings are concerning for tenosynovitis, possibly infectious in nature.  Remaining visualized tendinous and ligamentous structures appear grossly intact noting that this examination is not tailored for evaluation of internal derangement of the hindfoot.    Sinus tarsi and tarsal tunnel  structures appear within normal limits.  Impression: 1. Findings concerning for abscess along the plantar surface of the calcaneus and deep to the plantar fascia as detailed above with possible involvement of the substance of the plantar fascia.  There is also suggestion of a sinus tract extending towards the skin surface along the lateral margins of the ankle with surrounding phlegmonous reaction.  2. Nonspecific marrow edema throughout the tuberosity of the calcaneus which may be reactive in nature.  Early osteomyelitis can not be excluded.  3. Findings concerning for mild tenosynovitis of the peroneus longus and brevis tendons, possibly infectious in nature.  This report was flagged in Epic as abnormal.    Electronically signed by: Jeff Lopez MD  Date:    12/29/2019  Time:    08:09          Assessment/Plan:      * Cellulitis of left foot  Possible from the plantar fascitis vs from the injection.  Pt had couple of rounds of oral abx, not clearing the infection/edema totally.    Will place on iv vanc and zosyn.  Will order MRI. Although he had one a month ago, but no resolution of the symptoms with abx.  Blood cultures ordered in ED, doubt goof results given the abx recent use  Might consider ID consult if needed or podiatry consult    12/27 no osteo on mri  Discuss with DPM  12/28 awaiting MRI rear foot.  Continue abx,  Neg cultures so far  12/29 MRI of rear foot concerning for early osteo,   Consulting ID, F/U with DPM      Pain in limb  Secondary to faciitis vs inflammation vs infection.  Symptomatic tx.  tx underlying cause  12/27 no DVT on Us  12/28 improving slowly  12/29 controlled    Edema of left foot  See above       GERD (gastroesophageal reflux disease)  Not on meds at home  monitor      Soft tissue infection  12/27 consulted podiatry for help in management  12/28 DPM on board.  MRI rear foot ordered  12/29 abnormal MRI rear foot, see above      VTE Risk Mitigation (From admission, onward)          Ordered     enoxaparin injection 40 mg  Daily      12/26/19 1432     IP VTE HIGH RISK PATIENT  Once      12/26/19 1627                      Rolo Patel DO  Department of Hospital Medicine   [facility]

## 2019-12-29 NOTE — NURSING
Cued into patient's room with permission for patient rounds. Family at bedside. Plan of care reviewed and patient education on fall precautions with teach back method. Bed alarm is on and activated for patient safety. Time allowed for questions and answers. Denies pain at this time. Will continue to be available to assist with patient care as needed

## 2019-12-30 ENCOUNTER — ANESTHESIA (OUTPATIENT)
Dept: SURGERY | Facility: HOSPITAL | Age: 64
DRG: 581 | End: 2019-12-30
Payer: COMMERCIAL

## 2019-12-30 LAB
ANION GAP SERPL CALC-SCNC: 11 MMOL/L (ref 8–16)
BASOPHILS # BLD AUTO: 0.03 K/UL (ref 0–0.2)
BASOPHILS NFR BLD: 0.5 % (ref 0–1.9)
BUN SERPL-MCNC: 11 MG/DL (ref 8–23)
CALCIUM SERPL-MCNC: 9.1 MG/DL (ref 8.7–10.5)
CHLORIDE SERPL-SCNC: 104 MMOL/L (ref 95–110)
CO2 SERPL-SCNC: 25 MMOL/L (ref 23–29)
CREAT SERPL-MCNC: 1 MG/DL (ref 0.5–1.4)
DIFFERENTIAL METHOD: ABNORMAL
EOSINOPHIL # BLD AUTO: 0.2 K/UL (ref 0–0.5)
EOSINOPHIL NFR BLD: 3 % (ref 0–8)
ERYTHROCYTE [DISTWIDTH] IN BLOOD BY AUTOMATED COUNT: 12.9 % (ref 11.5–14.5)
EST. GFR  (AFRICAN AMERICAN): >60 ML/MIN/1.73 M^2
EST. GFR  (NON AFRICAN AMERICAN): >60 ML/MIN/1.73 M^2
GLUCOSE SERPL-MCNC: 95 MG/DL (ref 70–110)
GRAM STN SPEC: NORMAL
HCT VFR BLD AUTO: 41.1 % (ref 40–54)
HGB BLD-MCNC: 13.6 G/DL (ref 14–18)
LYMPHOCYTES # BLD AUTO: 2 K/UL (ref 1–4.8)
LYMPHOCYTES NFR BLD: 32.4 % (ref 18–48)
MAGNESIUM SERPL-MCNC: 2.2 MG/DL (ref 1.6–2.6)
MCH RBC QN AUTO: 30.4 PG (ref 27–31)
MCHC RBC AUTO-ENTMCNC: 33.1 G/DL (ref 32–36)
MCV RBC AUTO: 92 FL (ref 82–98)
MONOCYTES # BLD AUTO: 0.7 K/UL (ref 0.3–1)
MONOCYTES NFR BLD: 11.9 % (ref 4–15)
NEUTROPHILS # BLD AUTO: 3.2 K/UL (ref 1.8–7.7)
NEUTROPHILS NFR BLD: 52.2 % (ref 38–73)
PLATELET # BLD AUTO: 289 K/UL (ref 150–350)
PMV BLD AUTO: 9 FL (ref 9.2–12.9)
POTASSIUM SERPL-SCNC: 3.9 MMOL/L (ref 3.5–5.1)
RBC # BLD AUTO: 4.47 M/UL (ref 4.6–6.2)
SODIUM SERPL-SCNC: 140 MMOL/L (ref 136–145)
VANCOMYCIN TROUGH SERPL-MCNC: 12.9 UG/ML (ref 10–22)
WBC # BLD AUTO: 6.05 K/UL (ref 3.9–12.7)

## 2019-12-30 PROCEDURE — 88311 DECALCIFY TISSUE: CPT | Mod: 26,,, | Performed by: PATHOLOGY

## 2019-12-30 PROCEDURE — 87205 SMEAR GRAM STAIN: CPT | Mod: 59

## 2019-12-30 PROCEDURE — 20220 PR BONE BIOPSY,TROCAR/NEEDLE SUPERF: ICD-10-PCS | Mod: 59,,, | Performed by: STUDENT IN AN ORGANIZED HEALTH CARE EDUCATION/TRAINING PROGRAM

## 2019-12-30 PROCEDURE — 87015 SPECIMEN INFECT AGNT CONCNTJ: CPT

## 2019-12-30 PROCEDURE — 25000003 PHARM REV CODE 250: Performed by: HOSPITALIST

## 2019-12-30 PROCEDURE — 83735 ASSAY OF MAGNESIUM: CPT

## 2019-12-30 PROCEDURE — 85025 COMPLETE CBC W/AUTO DIFF WBC: CPT

## 2019-12-30 PROCEDURE — 36000707: Performed by: STUDENT IN AN ORGANIZED HEALTH CARE EDUCATION/TRAINING PROGRAM

## 2019-12-30 PROCEDURE — 87206 SMEAR FLUORESCENT/ACID STAI: CPT | Mod: 91

## 2019-12-30 PROCEDURE — 99232 PR SUBSEQUENT HOSPITAL CARE,LEVL II: ICD-10-PCS | Mod: ,,, | Performed by: STUDENT IN AN ORGANIZED HEALTH CARE EDUCATION/TRAINING PROGRAM

## 2019-12-30 PROCEDURE — 87070 CULTURE OTHR SPECIMN AEROBIC: CPT | Mod: 59

## 2019-12-30 PROCEDURE — 28002 PR DEEP DISSEC FOOT INFEC,1 BURSA: ICD-10-PCS | Mod: LT,,, | Performed by: STUDENT IN AN ORGANIZED HEALTH CARE EDUCATION/TRAINING PROGRAM

## 2019-12-30 PROCEDURE — 87186 SC STD MICRODIL/AGAR DIL: CPT | Mod: 59

## 2019-12-30 PROCEDURE — 11000001 HC ACUTE MED/SURG PRIVATE ROOM

## 2019-12-30 PROCEDURE — 87116 MYCOBACTERIA CULTURE: CPT

## 2019-12-30 PROCEDURE — 20220 BONE BIOPSY TROCAR/NDL SUPFC: CPT | Mod: 59,,, | Performed by: STUDENT IN AN ORGANIZED HEALTH CARE EDUCATION/TRAINING PROGRAM

## 2019-12-30 PROCEDURE — 88307 PR  SURG PATH,LEVEL V: ICD-10-PCS | Mod: 26,,, | Performed by: PATHOLOGY

## 2019-12-30 PROCEDURE — 80048 BASIC METABOLIC PNL TOTAL CA: CPT

## 2019-12-30 PROCEDURE — 87075 CULTR BACTERIA EXCEPT BLOOD: CPT | Mod: 59

## 2019-12-30 PROCEDURE — 99232 SBSQ HOSP IP/OBS MODERATE 35: CPT | Mod: ,,, | Performed by: STUDENT IN AN ORGANIZED HEALTH CARE EDUCATION/TRAINING PROGRAM

## 2019-12-30 PROCEDURE — 88307 TISSUE EXAM BY PATHOLOGIST: CPT | Mod: 26,,, | Performed by: PATHOLOGY

## 2019-12-30 PROCEDURE — 63600175 PHARM REV CODE 636 W HCPCS: Performed by: HOSPITALIST

## 2019-12-30 PROCEDURE — 88311 PR  DECALCIFY TISSUE: ICD-10-PCS | Mod: 26,,, | Performed by: PATHOLOGY

## 2019-12-30 PROCEDURE — 36000706: Performed by: STUDENT IN AN ORGANIZED HEALTH CARE EDUCATION/TRAINING PROGRAM

## 2019-12-30 PROCEDURE — 88307 TISSUE EXAM BY PATHOLOGIST: CPT | Performed by: PATHOLOGY

## 2019-12-30 PROCEDURE — 87077 CULTURE AEROBIC IDENTIFY: CPT

## 2019-12-30 PROCEDURE — 36415 COLL VENOUS BLD VENIPUNCTURE: CPT

## 2019-12-30 PROCEDURE — 37000008 HC ANESTHESIA 1ST 15 MINUTES: Performed by: STUDENT IN AN ORGANIZED HEALTH CARE EDUCATION/TRAINING PROGRAM

## 2019-12-30 PROCEDURE — 25000003 PHARM REV CODE 250: Performed by: STUDENT IN AN ORGANIZED HEALTH CARE EDUCATION/TRAINING PROGRAM

## 2019-12-30 PROCEDURE — 87102 FUNGUS ISOLATION CULTURE: CPT

## 2019-12-30 PROCEDURE — 28002 TREATMENT OF FOOT INFECTION: CPT | Mod: LT,,, | Performed by: STUDENT IN AN ORGANIZED HEALTH CARE EDUCATION/TRAINING PROGRAM

## 2019-12-30 PROCEDURE — 88311 DECALCIFY TISSUE: CPT | Performed by: PATHOLOGY

## 2019-12-30 PROCEDURE — 63600175 PHARM REV CODE 636 W HCPCS: Performed by: NURSE ANESTHETIST, CERTIFIED REGISTERED

## 2019-12-30 PROCEDURE — 80202 ASSAY OF VANCOMYCIN: CPT

## 2019-12-30 PROCEDURE — 37000009 HC ANESTHESIA EA ADD 15 MINS: Performed by: STUDENT IN AN ORGANIZED HEALTH CARE EDUCATION/TRAINING PROGRAM

## 2019-12-30 PROCEDURE — 71000033 HC RECOVERY, INTIAL HOUR: Performed by: STUDENT IN AN ORGANIZED HEALTH CARE EDUCATION/TRAINING PROGRAM

## 2019-12-30 RX ORDER — MIDAZOLAM HYDROCHLORIDE 1 MG/ML
INJECTION INTRAMUSCULAR; INTRAVENOUS
Status: DISCONTINUED | OUTPATIENT
Start: 2019-12-30 | End: 2019-12-30

## 2019-12-30 RX ORDER — PROPOFOL 10 MG/ML
VIAL (ML) INTRAVENOUS
Status: DISCONTINUED | OUTPATIENT
Start: 2019-12-30 | End: 2019-12-30

## 2019-12-30 RX ORDER — BUPIVACAINE HYDROCHLORIDE 2.5 MG/ML
INJECTION, SOLUTION EPIDURAL; INFILTRATION; INTRACAUDAL
Status: DISCONTINUED | OUTPATIENT
Start: 2019-12-30 | End: 2019-12-30 | Stop reason: HOSPADM

## 2019-12-30 RX ORDER — PROPOFOL 10 MG/ML
VIAL (ML) INTRAVENOUS CONTINUOUS PRN
Status: DISCONTINUED | OUTPATIENT
Start: 2019-12-30 | End: 2019-12-30

## 2019-12-30 RX ORDER — LIDOCAINE HCL/PF 100 MG/5ML
SYRINGE (ML) INTRAVENOUS
Status: DISCONTINUED | OUTPATIENT
Start: 2019-12-30 | End: 2019-12-30

## 2019-12-30 RX ORDER — SODIUM CHLORIDE 9 MG/ML
INJECTION, SOLUTION INTRAVENOUS CONTINUOUS PRN
Status: DISCONTINUED | OUTPATIENT
Start: 2019-12-30 | End: 2019-12-30

## 2019-12-30 RX ADMIN — MIDAZOLAM HYDROCHLORIDE 2 MG: 1 INJECTION, SOLUTION INTRAMUSCULAR; INTRAVENOUS at 12:12

## 2019-12-30 RX ADMIN — PIPERACILLIN SODIUM AND TAZOBACTAM SODIUM 4.5 G: 4; .5 INJECTION, POWDER, LYOPHILIZED, FOR SOLUTION INTRAVENOUS at 01:12

## 2019-12-30 RX ADMIN — PROPOFOL 100 MCG/KG/MIN: 10 INJECTION, EMULSION INTRAVENOUS at 12:12

## 2019-12-30 RX ADMIN — PIPERACILLIN SODIUM AND TAZOBACTAM SODIUM 4.5 G: 4; .5 INJECTION, POWDER, LYOPHILIZED, FOR SOLUTION INTRAVENOUS at 11:12

## 2019-12-30 RX ADMIN — PROPOFOL 30 MG: 10 INJECTION, EMULSION INTRAVENOUS at 12:12

## 2019-12-30 RX ADMIN — OXYCODONE HYDROCHLORIDE 5 MG: 5 TABLET ORAL at 08:12

## 2019-12-30 RX ADMIN — VANCOMYCIN HYDROCHLORIDE 1000 MG: 1 INJECTION, POWDER, LYOPHILIZED, FOR SOLUTION INTRAVENOUS at 08:12

## 2019-12-30 RX ADMIN — VANCOMYCIN HYDROCHLORIDE 1000 MG: 1 INJECTION, POWDER, LYOPHILIZED, FOR SOLUTION INTRAVENOUS at 09:12

## 2019-12-30 RX ADMIN — Medication 6 MG: at 08:12

## 2019-12-30 RX ADMIN — ENOXAPARIN SODIUM 40 MG: 100 INJECTION SUBCUTANEOUS at 04:12

## 2019-12-30 RX ADMIN — PROPOFOL 20 MG: 10 INJECTION, EMULSION INTRAVENOUS at 12:12

## 2019-12-30 RX ADMIN — PROPOFOL 50 MG: 10 INJECTION, EMULSION INTRAVENOUS at 12:12

## 2019-12-30 RX ADMIN — OXYCODONE HYDROCHLORIDE 5 MG: 5 TABLET ORAL at 01:12

## 2019-12-30 RX ADMIN — PIPERACILLIN SODIUM AND TAZOBACTAM SODIUM 4.5 G: 4; .5 INJECTION, POWDER, LYOPHILIZED, FOR SOLUTION INTRAVENOUS at 04:12

## 2019-12-30 RX ADMIN — SODIUM CHLORIDE: 0.9 INJECTION, SOLUTION INTRAVENOUS at 12:12

## 2019-12-30 RX ADMIN — LIDOCAINE HYDROCHLORIDE 50 MG: 20 INJECTION, SOLUTION INTRAVENOUS at 12:12

## 2019-12-30 NOTE — ASSESSMENT & PLAN NOTE
-Pt with localized swelling and erythema to lateral left foot, relates to pain along rearfoot and lower leg. Appears to be clinically improving.   -Continue IV ABX for cellulitis per primary medicine  -MRI left hindfoot with concerns for abscess plantar to calcaneus with sinus track to the lateral leg.   -Long conversation was had with patient regarding the treatment plan going forward. The risks and complications include but are not limited to: death, pain, swelling, infection, bleeding, non-healing, recurrence, blood clots and further surgery. Alternative treatments were also discussed with patient as well as the risks and complications associated with each. Patient is amenable to the terms and consent was acquired.   - OR today. Will obtain surgical cultures. NPO until after the procedure.  - Podiatry will follow.

## 2019-12-30 NOTE — PLAN OF CARE
Patient went for I& D to left ankle today. Plan of care reviewed with patient. Patient has been resting in bed and was instructed to call for staff assistance. Tele- NSR. Patient continent and up with STBY assist to bathroom. Kept clean and dry per nurse aid. Patient receiving IV ABX with no reactions noted.  Fall/ safety precautions maintained. Bed alarm activated, slip resistant socks on, bed in lowest position with wheels locked, side rails up x 2, call light within reach. Patient verbalized understanding of plan of care with no further questions noted.  Will continue to monitor patient.

## 2019-12-30 NOTE — ANESTHESIA POSTPROCEDURE EVALUATION
Anesthesia Post Evaluation    Patient: Alessandro Cerna    Procedure(s) Performed: Procedure(s) (LRB):  IRRIGATION AND DEBRIDEMENT, LOWER EXTREMITY Bone biopsy heel (Left)    Final Anesthesia Type: general    Patient location during evaluation: PACU  Patient participation: Yes- Able to Participate  Level of consciousness: awake and alert and oriented  Post-procedure vital signs: reviewed and stable  Pain management: adequate  Airway patency: patent    PONV status at discharge: No PONV  Anesthetic complications: no      Cardiovascular status: blood pressure returned to baseline and hemodynamically stable  Respiratory status: unassisted, spontaneous ventilation and room air  Hydration status: euvolemic  Follow-up not needed.          Vitals Value Taken Time   /86 12/30/2019  1:24 PM   Temp 36.6 °C (97.8 °F) 12/30/2019  1:00 PM   Pulse 58 12/30/2019  1:26 PM   Resp 18 12/30/2019  1:26 PM   SpO2 99 % 12/30/2019  1:26 PM   Vitals shown include unvalidated device data.      No case tracking events are documented in the log.      Pain/Matteo Score: Pain Rating Prior to Med Admin: 5 (12/30/2019  1:24 PM)  Matteo Score: 9 (12/30/2019  1:15 PM)

## 2019-12-30 NOTE — TRANSFER OF CARE
"Anesthesia Transfer of Care Note    Patient: Alessandro Cerna    Procedure(s) Performed: Procedure(s) (LRB):  IRRIGATION AND DEBRIDEMENT, LOWER EXTREMITY Bone biopsy heel (Left)    Patient location: PACU    Anesthesia Type: MAC    Transport from OR: Transported from OR on room air with adequate spontaneous ventilation    Post pain: adequate analgesia    Post assessment: no apparent anesthetic complications    Post vital signs: stable    Level of consciousness: responds to stimulation and sedated    Nausea/Vomiting: no nausea/vomiting    Complications: none    Transfer of care protocol was followed      Last vitals:   Visit Vitals  /77 (BP Location: Right arm, Patient Position: Sitting)   Pulse (!) 55   Temp 36.6 °C (97.9 °F) (Oral)   Resp 18   Ht 5' 7" (1.702 m)   Wt 76.8 kg (169 lb 5 oz)   SpO2 99%   BMI 26.52 kg/m²     "

## 2019-12-30 NOTE — CONSULTS
[facility] Consult     Primary Team:   Consultant Attending: Dr. Granda  Date of Admit: 12/26/2019    Reason for Consult     Concern for osteo in left foot    History of Present Illness     The patient is a 64 years old male with PMH significant for GERD on no medication and TB in 1982, S/P treatment. Patient presented to ED on 12/26/19 with ongoing left ankle calcanuous erythema and pain. Patient has a photo of his foot that appears to show generalized edema of the foot with ecchymotic appearance.     Patient had plantar fascitis of the left foot, for which he was seen and evaluated by Dr. Cobb (podiatry) and had steroid injection at the medial part of the foot on 11/20, since then he had swelling and edema and pain in the left foot, he was seen again by Dr. Cobb on 12/5 and was told it is non infectiouis process and prescribed a Medrol dosepak. Patient stated that this did not relieve his symptoms.    The patient came to ED on 11/27 and was given 5 day course of Keflex, he was given a repeat 5 day course of Keflex during his podiatry visit on 12/5, he had some resolution but edema is back.  Patient also had MRI a month ago showing edema of the area of the fascia and possibly the muscle.     The patient denies taking any medications at home, he is healthy otherwise. Patient quit smoking over 40 years ago. He is a social drinker, a drink or two a week. He denies any illicit drug use.    Patient denies any trauma or injury to the foot. He denies exposing the foot to any outside water sources. He does not hunt, fish, hike or do any other outdoor activities. He does not have any pets and denies any animal contact with the foot.     Since admission patient was started on broad spectrum coverage with Zosyn and Vancomycin. His renal function is appropriate. MRI images of left hindfoot were concerning for abscess along the plantar surface of the calcaneus and deep to the plantar fascia with possible involvement  of the substance of the plantar fascia. There is also suggestion of a sinus tract extending towards the skin surface along the lateral margins of the ankle with surrounding phlegmonous reaction. Patient was taken to OR today by podiatry for I and D with cultures. No purulence was noted.    Review of Systems (positives in bold):  Constitutional: wt loss, fever, chills, night sweats, fatigue, malaise  HEENT: dysphonia, epistaxis, tinnitus, vertigo, otalgia, otorrhea, visual changes, lacrimation, changes in hearing, rhinorrhea, sore throat  Urogenital: frequency, nocturia, dysuria, hematuria, retention, incontinence, discharge  Neurological: headaches, syncope, weakness, numbness, paresthesia, dysequilibrium, falls, amnesia, dysarthria, facial assymetry  Gastrointestinal: anorexia, nausea, vomiting, melena, hematochezia, abdominal pain, hematemesis, diarrhea, constipation, dyspepsia, dysphagia, odynophagia, dysgeusia  Respiratory: dyspnea, cough, sputum production, wheeze, hemoptysis, cyanosis, apnea  Integumentary: hypo/hyperpigmentation, rash, lesions, pruritus, alopecia, nail changes  Cardiovascular: chest pain, orthopnea, cyanosis, edema, claudication, syncope, palpitations  Hematological: bleeding, bruising, lymphadenopathy  Psych: insomnia, mood changes, hallucinations, anxiety  Reproductive: erectile dysfunction in men, abnormal uterine bleeding in women, changes in libido  Immune/Allergy: urticaria, localized pain/erythema/warmth/swelling  Musculoskeletal: arthralgia, myalgia, + pain and erythema at the lateral aspect of left ankle, + generalized edema and ecchymotic appearance of left foot, stiffness, wasting, deformity, weakness, back pain  Endocrine: gynecomastia, galactorrhea, weight gain, heat/cold intolerance, polyphagia, polydipsia, polyuria    Allergies:  Review of patient's allergies indicates:  No Known Allergies    Medications:   In-Hospital Scheduled Medications:   enoxaparin  40 mg Subcutaneous  Daily    piperacillin-tazobactam (ZOSYN) IVPB  4.5 g Intravenous Q8H    vancomycin (VANCOCIN) IVPB  1,000 mg Intravenous Q12H      In-Hospital PRN Medications:  acetaminophen, acetaminophen, influenza, melatonin, ondansetron, oxyCODONE, sodium chloride 0.9%, Pharmacy to dose Vancomycin consult **AND** vancomycin - pharmacy to dose   In-Hospital IV Infusion Medications:    Relevant Home Medications:    Antibiotics and Day Number of Therapy:  Antibiotics (From admission, onward)    Start     Stop Route Frequency Ordered    19 2100  vancomycin in dextrose 5 % 1 gram/250 mL IVPB 1,000 mg      -- IV Every 12 hours (non-standard times) 19 1745    19 1730  piperacillin-tazobactam 4.5 g in dextrose 5 % 100 mL IVPB (ready to mix system)      -- IV Every 8 hours (non-standard times) 19 1627    19 1627  vancomycin - pharmacy to dose  (vancomycin IVPB)      -- IV pharmacy to manage frequency 19 1627        Past Medical History:  Past Medical History:   Diagnosis Date    Chronic seasonal allergic rhinitis 2017    GERD (gastroesophageal reflux disease)     TB (pulmonary tuberculosis) 1982    Treated [facility] 9 mo therapy.     Past Surgical History/ObGyn Hx if gender appropriate:  History reviewed. No pertinent surgical history.  Family History:  Family History   Problem Relation Age of Onset    Breast cancer Mother     Prostate cancer Father     Emphysema Neg Hx      Social History:  Social History     Tobacco Use    Smoking status: Former Smoker     Packs/day: 1.50     Years: 2.00     Pack years: 3.00     Types: Cigarettes     Last attempt to quit: 4/3/1977     Years since quittin.7    Smokeless tobacco: Never Used   Substance Use Topics    Alcohol use: Yes     Alcohol/week: 3.0 standard drinks     Types: 3 Cans of beer per week     Frequency: 2-3 times a week     Comment: one drink/week    Drug use: No     Objective   Last 24 Hour Vital Signs:  BP  Min: 114/70  Max:  132/72  Temp  Av.5 °F (36.4 °C)  Min: 96.2 °F (35.7 °C)  Max: 98.2 °F (36.8 °C)  Pulse  Av.4  Min: 55  Max: 80  Resp  Av.3  Min: 17  Max: 20  SpO2  Av.3 %  Min: 98 %  Max: 99 %    Lines, Drains, Airway:  none    Physical Examination:    Examination  General: well appearing, comfortable   HEENT: normal oral mucosa, normal dentition, conjunctiva normal, pupils normal, extraocular motion normal  Neck: no thyromegaly, no JVD   Cardiac: no murmurs, pulse regular    Pulmonary/Chest: chest clear, no respiratory distress   GI/Rectal: no organomegaly, no masses, non tender, normal bowel sounds, rectal exam deferred   : deferred   Msk: normal motor screening exam  Vascular: normal peripheral perfusion   Lymph nodes: none palpated  Skin/ Extremities: no rash, no ulceration, left foot dressed post surgery    Neurology/ Mental status: alert oriented     Laboratory:  CBC:   Lab Results   Component Value Date    WBC 6.05 2019    HGB 13.6 (L) 2019     2019    MCV 92 2019    RDW 12.9 2019       Estimated Creatinine Clearance: 71.6 mL/min (based on SCr of 1 mg/dL).    Microbiology Data:  Microbiology Results (last 7 days)     Procedure Component Value Units Date/Time    Blood Culture #1 **CANNOT BE ORDERED STAT** [[id number]] Collected:  19 0857    Order Status:  Completed Specimen:  Blood from Peripheral, Antecubital, Right Updated:  19 1412     Blood Culture, Routine No Growth to date      No Growth to date      No Growth to date      No Growth to date    Blood Culture #2 **CANNOT BE ORDERED STAT** [[id number]] Collected:  19 0909    Order Status:  Completed Specimen:  Blood from Peripheral, Forearm, Right Updated:  19 1412     Blood Culture, Routine No Growth to date      No Growth to date      No Growth to date      No Growth to date          Radiology:    MRI forefoot, left w and wo contrast on 19    Impression       No evidence for  osteomyelitis.    Abnormal edema and enhancement of the flexor digitorum brevis and abductor digiti minimi along the plantar foot, partially visualized and potentially infectious versus inflammatory in nature.     MRI hindfoot, left wo contrast on 12/5/19    Impression       Thickening and edema at the origin of the plantar fascia.  There is greater edema at the lateral plantar  fascia and underlying abductor digiti minimi muscle.  Reactive edema the inferior calcaneus.  No definite fluid collection to suggest intraosseous or soft tissue abscess.     Assessment     Alessandro Cerna is a 64 y.o. male currently admitted due to concern for abscess at the lateral aspect of left malleolus. I and D with cultures today. No evidence for osteo on imaging. Continue broad spectrum coverage.     Recommendations     Cellulitis of left foot  -Continue on Vancomycin and Zosyn  -MRI left hindfoot with concerns for abscess plantar to calcaneus with sinus track to the lateral leg. I and D did not show purulence.   -Surgical cultures obtained today.    Thank you for this consult. We will follow.      Duke Anthony  Fellow, [facility]

## 2019-12-30 NOTE — NURSING
Pain 3/10 when ambulating to bathroom with SBA otherwise 0/10. Wife at bedside. Consent for I&D in chart. Pt will be NPO after midnight, educated. Lovenox given for VTE. Pt moves frequently in bed performing leg exercises. +2 edema to left ankle with purple discoloration. IV abx continue. Bed low, bed alarm in place, call light in reach.

## 2019-12-30 NOTE — PLAN OF CARE
Plan of care reviewed with patient and wife.  Patient verbalized understanding. Patietn states no complaints.  Diet NPO.  VTE Lovenox. Bed in lowest position.  Call bell in reach

## 2019-12-30 NOTE — OP NOTE
Operative Note       Surgery Date: 12/30/2019     Surgeon(s) and Role:     * Karo Marsh MD - Primary  Ck Ly DPM, PGY-2      Pre-op Diagnosis:  Cellulitis of left foot [L03.116]  Soft tissue infection [L08.9]    Post-op Diagnosis: Post-Op Diagnosis Codes:     * Cellulitis of left foot [L03.116]     * Soft tissue infection [L08.9]    Procedure(s) (LRB):  Incision and Drainage, Bone biopsy heel (Left)    Anesthesia: Local MAC    Procedure in Detail/Findings:    The patient was brought to the operating room on a stretcher and transferred to the OR table in a  supine position. Following the successful induction of MAC, a tourniquet was applied to the left ankle and local block was given with 20 ml of 1% lidocaine and 0.5% marcaine plain mixed in a 1:1 ratio as an ankle block. The left lower extremity was prepped and draped in a sterile manner.     A timeout was performed confirming the patient's identification, procedure, surgical site, medications and allergies. All were in agreement.    Tourniquet was not inflated.    Attention to the lateral left foot. There is a small area of localized edema with bogginess/questionable deep fluctuance noted to the lateral hindfoot that corresponds with possible sinus on MRI. A small 2.5 cm incision was made over this area and the pockets of tissue were bluntly opened with a curved metzenbaum scissor. There was about 10mL of bloody serosanguinous drainage but no purulence. The incision was bluntly deepened and a freer elevator was used to explore the wound deep to the fascia and muscle and to the level of bone. It was found to track medially along the plantar aspect of the calcaneus by 5 cm. No purulence was expressed from this area. Deep wound cultures were taken at this point.    The wound was then copiously flushed with sterile saline solution and the wound was closed with simple interrupted sutures with 4-0 prolene.  Tourniquet was not used. A [employer] needle was then  used to extract small portions of bone from the calcaneus from an adjacent site for pathology and cultures. The incision site was dressed with betadine, 4x4s, cast padding, ACE and xeroform.    The patient was transferred to the recovery room with vital signs stable. Following a period of post op monitoring, the patient will be returned to floor with the following post op instructions:   1. Keep dressing dry and intact until Podiatry follow up.   2.Weight bearing status: Minimal weight bearing to the left lower extremity except for short transfers for the first 72 hours to reduce bleeding then weight bearing as tolerated in surgical shoe  3. All necessary prescriptions ordered and medical management will continue.   4. Contact Podiatry for all post op follow up care and if any problems arise.      Estimated Blood Loss: 12mL           Specimens (From admission, onward)    None        Implants: * No implants in log *           Disposition: PACU - hemodynamically stable.           Condition: Good    Attestation:  I was present and scrubbed for the entire procedure.

## 2019-12-30 NOTE — PLAN OF CARE
Patient back to floor from I&D procedure to left ankle. Patient complains of 10/10 pain. Surgical nurse gave a one time dose of Oxycodone before sending patient to the floor. Patient is able to move toes, cap refill less than 3. Vitals stable. Doctor notified that patient is back from surgery and needs a new diet order. Patient and family deny additional needs at this time.    oral temp of 101.3, hr of 125, pt shivering, pt offers no complaints.

## 2019-12-30 NOTE — PROGRESS NOTES
[facility]  Podiatry  Progress Note    Patient Name: Alessandro Cerna  MRN: [id number]  Admission Date: 12/26/2019  Hospital Length of Stay: 1 days  Attending Physician: Rolo Patel DO  Primary Care Provider: Jamaal Cabrera DO     Subjective:     Interval History:   Patient seen at bedside. NAEON. Denies any pedal or calf pain at this time. Did not receive his MRI of his hindfoot yesterday or this AM. Patient upset because he does not know the plan of care moving forward. Denies N/V/F/C.    12/30/2019: NAEON. Nervous about the procedure today. Otherwise, feeling well. Has been NPO as instructed.       Scheduled Meds:   enoxaparin  40 mg Subcutaneous Daily    piperacillin-tazobactam (ZOSYN) IVPB  4.5 g Intravenous Q8H    vancomycin (VANCOCIN) IVPB  1,000 mg Intravenous Q12H     Continuous Infusions:  PRN Meds:acetaminophen, acetaminophen, influenza, melatonin, ondansetron, oxyCODONE, sodium chloride 0.9%, Pharmacy to dose Vancomycin consult **AND** vancomycin - pharmacy to dose    Review of Systems   Constitutional: Negative for appetite change, chills, diaphoresis and fever.   Respiratory: Negative for shortness of breath.    Cardiovascular: Positive for leg swelling.   Gastrointestinal: Negative for nausea and vomiting.   Musculoskeletal: Negative for arthralgias, back pain, gait problem, joint swelling and myalgias.   Skin: Positive for color change. Negative for pallor, rash and wound.   Neurological: Negative for numbness.     Objective:     Vital Signs (Most Recent):  Temp: 97.9 °F (36.6 °C) (12/30/19 0807)  Pulse: (!) 55 (12/30/19 0822)  Resp: 18 (12/30/19 0807)  BP: 125/77 (12/30/19 0807)  SpO2: 99 % (12/30/19 0807) Vital Signs (24h Range):  Temp:  [96.2 °F (35.7 °C)-98.2 °F (36.8 °C)] 97.9 °F (36.6 °C)  Pulse:  [55-80] 55  Resp:  [17-20] 18  SpO2:  [98 %-99 %] 99 %  BP: (114-132)/(70-82) 125/77     Weight: 76.8 kg (169 lb 5 oz)  Body mass index is 26.52 kg/m².    Foot Exam    Left  Foot/Ankle      Inspection and Palpation  Left foot ecchymosis: Lateral reafoot.  Tenderness: calcaneus tenderness   Swelling: (Lateral heel)  Skin Exam: cellulitis, abnormal color and erythema; no drainage, no maceration and no ulcer     Neurovascular  Dorsalis pedis: 2+  Posterior tibial: 2+  Saphenous nerve sensation: normal  Tibial nerve sensation: normal  Superficial peroneal nerve sensation: normal  Deep peroneal nerve sensation: normal  Sural nerve sensation: normal            Laboratory:  A1C: No results for input(s): HGBA1C in the last 4320 hours.  CBC:   Recent Labs   Lab 12/30/19 0457   WBC 6.05   RBC 4.47*   HGB 13.6*   HCT 41.1      MCV 92   MCH 30.4   MCHC 33.1     CMP:   Recent Labs   Lab 12/26/19 0857  12/30/19 0455   *   < > 95   CALCIUM 9.5   < > 9.1   ALBUMIN 4.1  --   --    PROT 8.6*  --   --       < > 140   K 3.6   < > 3.9   CO2 23   < > 25      < > 104   BUN 9   < > 11   CREATININE 1.0   < > 1.0   ALKPHOS 86  --   --    ALT 13  --   --    AST 15  --   --    BILITOT 1.6*  --   --     < > = values in this interval not displayed.     CRP:   No results for input(s): CRP in the last 168 hours.  ESR:   No results for input(s): SEDRATE in the last 168 hours.  Wound Cultures: No results for input(s): LABAERO in the last 4320 hours.  Microbiology Results (last 7 days)     Procedure Component Value Units Date/Time    Blood Culture #1 **CANNOT BE ORDERED STAT** [[id number]] Collected:  12/26/19 0857    Order Status:  Completed Specimen:  Blood from Peripheral, Antecubital, Right Updated:  12/29/19 1412     Blood Culture, Routine No Growth to date      No Growth to date      No Growth to date      No Growth to date    Blood Culture #2 **CANNOT BE ORDERED STAT** [[id number]] Collected:  12/26/19 0909    Order Status:  Completed Specimen:  Blood from Peripheral, Forearm, Right Updated:  12/29/19 1412     Blood Culture, Routine No Growth to date      No Growth to date      No Growth  to date      No Growth to date        Specimen (12h ago, onward)    None          Diagnostic Results:  I have reviewed all pertinent imaging results/findings within the past 24 hours.   Imaging Results          X-Ray Foot Complete Left (Final result)  Result time 12/26/19 09:26:22    Final result by Tari Montes MD (12/26/19 09:26:22)                 Impression:      Mild soft tissue swelling overlying the lateral malleolus, no acute process seen      Electronically signed by: Tari Montes MD  Date:    12/26/2019  Time:    09:26             Narrative:    EXAMINATION:  XR FOOT COMPLETE 3 VIEW LEFT    CLINICAL HISTORY:  .  Pain in left foot    TECHNIQUE:  AP, lateral and oblique views of the left foot were performed.    COMPARISON:  11/27/2019    FINDINGS:  The alignment and mineralization appear normal.  No fracture seen, no osseous lesions.  No advanced degenerative change.  Soft tissue swelling overlying the lateral malleolus.                                  Clinical Findings:  Erythema and edema noted to lateral rearfoot with mild bogginess noted. Erythema appears to be localized without streaking noted. No drainage or malodor noted. Mild POP. No open wounds, skin appears stable and intact.     12/28 12/27          Assessment/Plan:     * Cellulitis of left foot  -Pt with localized swelling and erythema to lateral left foot, relates to pain along rearfoot and lower leg. Appears to be clinically improving.   -Continue IV ABX for cellulitis per primary medicine  -MRI left hindfoot with concerns for abscess plantar to calcaneus with sinus track to the lateral leg.   -Long conversation was had with patient regarding the treatment plan going forward. The risks and complications include but are not limited to: death, pain, swelling, infection, bleeding, hematoma, non-healing, ischemic changes, recurrence, blood clots and further surgery. Alternative treatments were also discussed with patient as  well as the risks and complications associated with each. Patient is amenable to the terms and consent was acquired. All questions were answered. No guarantees given or implied as to outcome. Informed verbal and written consent was obtained. Consent forms read, signed, witnessed. Patient for surgery  - OR today. Will obtain surgical cultures. NPO until after the procedure.  - Podiatry will follow.     Soft tissue infection  As above    Pain in limb  As above    Improving         Ck Ly MD  Podiatry  [facility]

## 2019-12-30 NOTE — SUBJECTIVE & OBJECTIVE
Subjective:     Interval History:   Patient seen at bedside. GEOFFREY. Denies any pedal or calf pain at this time. Did not receive his MRI of his hindfoot yesterday or this AM. Patient upset because he does not know the plan of care moving forward. Denies N/V/F/C.    12/30/2019: GEOFFREY. Nervous about the procedure today. Otherwise, feeling well. Has been NPO as instructed.       Scheduled Meds:   enoxaparin  40 mg Subcutaneous Daily    piperacillin-tazobactam (ZOSYN) IVPB  4.5 g Intravenous Q8H    vancomycin (VANCOCIN) IVPB  1,000 mg Intravenous Q12H     Continuous Infusions:  PRN Meds:acetaminophen, acetaminophen, influenza, melatonin, ondansetron, oxyCODONE, sodium chloride 0.9%, Pharmacy to dose Vancomycin consult **AND** vancomycin - pharmacy to dose    Review of Systems   Constitutional: Negative for appetite change, chills, diaphoresis and fever.   Respiratory: Negative for shortness of breath.    Cardiovascular: Positive for leg swelling.   Gastrointestinal: Negative for nausea and vomiting.   Musculoskeletal: Negative for arthralgias, back pain, gait problem, joint swelling and myalgias.   Skin: Positive for color change. Negative for pallor, rash and wound.   Neurological: Negative for numbness.     Objective:     Vital Signs (Most Recent):  Temp: 97.9 °F (36.6 °C) (12/30/19 0807)  Pulse: (!) 55 (12/30/19 0822)  Resp: 18 (12/30/19 0807)  BP: 125/77 (12/30/19 0807)  SpO2: 99 % (12/30/19 0807) Vital Signs (24h Range):  Temp:  [96.2 °F (35.7 °C)-98.2 °F (36.8 °C)] 97.9 °F (36.6 °C)  Pulse:  [55-80] 55  Resp:  [17-20] 18  SpO2:  [98 %-99 %] 99 %  BP: (114-132)/(70-82) 125/77     Weight: 76.8 kg (169 lb 5 oz)  Body mass index is 26.52 kg/m².    Foot Exam    Left Foot/Ankle      Inspection and Palpation  Left foot ecchymosis: Lateral reafoot.  Tenderness: calcaneus tenderness   Swelling: (Lateral heel)  Skin Exam: cellulitis, abnormal color and erythema; no drainage, no maceration and no ulcer      Neurovascular  Dorsalis pedis: 2+  Posterior tibial: 2+  Saphenous nerve sensation: normal  Tibial nerve sensation: normal  Superficial peroneal nerve sensation: normal  Deep peroneal nerve sensation: normal  Sural nerve sensation: normal            Laboratory:  A1C: No results for input(s): HGBA1C in the last 4320 hours.  CBC:   Recent Labs   Lab 12/30/19 0457   WBC 6.05   RBC 4.47*   HGB 13.6*   HCT 41.1      MCV 92   MCH 30.4   MCHC 33.1     CMP:   Recent Labs   Lab 12/26/19 0857  12/30/19 0455   *   < > 95   CALCIUM 9.5   < > 9.1   ALBUMIN 4.1  --   --    PROT 8.6*  --   --       < > 140   K 3.6   < > 3.9   CO2 23   < > 25      < > 104   BUN 9   < > 11   CREATININE 1.0   < > 1.0   ALKPHOS 86  --   --    ALT 13  --   --    AST 15  --   --    BILITOT 1.6*  --   --     < > = values in this interval not displayed.     CRP:   No results for input(s): CRP in the last 168 hours.  ESR:   No results for input(s): SEDRATE in the last 168 hours.  Wound Cultures: No results for input(s): LABAERO in the last 4320 hours.  Microbiology Results (last 7 days)     Procedure Component Value Units Date/Time    Blood Culture #1 **CANNOT BE ORDERED STAT** [[id number]] Collected:  12/26/19 0857    Order Status:  Completed Specimen:  Blood from Peripheral, Antecubital, Right Updated:  12/29/19 1412     Blood Culture, Routine No Growth to date      No Growth to date      No Growth to date      No Growth to date    Blood Culture #2 **CANNOT BE ORDERED STAT** [[id number]] Collected:  12/26/19 0909    Order Status:  Completed Specimen:  Blood from Peripheral, Forearm, Right Updated:  12/29/19 1412     Blood Culture, Routine No Growth to date      No Growth to date      No Growth to date      No Growth to date        Specimen (12h ago, onward)    None          Diagnostic Results:  I have reviewed all pertinent imaging results/findings within the past 24 hours.   Imaging Results          X-Ray Foot Complete  Left (Final result)  Result time 12/26/19 09:26:22    Final result by Tari Montes MD (12/26/19 09:26:22)                 Impression:      Mild soft tissue swelling overlying the lateral malleolus, no acute process seen      Electronically signed by: Tari Montes MD  Date:    12/26/2019  Time:    09:26             Narrative:    EXAMINATION:  XR FOOT COMPLETE 3 VIEW LEFT    CLINICAL HISTORY:  .  Pain in left foot    TECHNIQUE:  AP, lateral and oblique views of the left foot were performed.    COMPARISON:  11/27/2019    FINDINGS:  The alignment and mineralization appear normal.  No fracture seen, no osseous lesions.  No advanced degenerative change.  Soft tissue swelling overlying the lateral malleolus.                                  Clinical Findings:  Erythema and edema noted to lateral rearfoot with mild bogginess noted. Erythema appears to be localized without streaking noted. No drainage or malodor noted. Mild POP. No open wounds, skin appears stable and intact.     12/28 12/27

## 2019-12-30 NOTE — PLAN OF CARE
Patient off floor to surgery via stretcher. Patient has zosyn infusing to right forearm. Tele box removed and placed at patient's bedside. Report given to Doretha at 0830.

## 2019-12-30 NOTE — PROGRESS NOTES
Pharmacokinetic Assessment Follow Up: IV Vancomycin    Vancomycin serum concentration assessment(s):    The trough level was drawn correctly and can be used to guide therapy at this time. The measurement is within the desired definitive target range of 10 to 15 mcg/mL.    Vancomycin Regimen Plan:    Continue regimen to Vancomycin 1000 mg IV every 12 hours with next serum trough concentration measured at   prior to 5-7 days dose on       Drug levels (last 3 results):  Recent Labs   Lab Result Units 12/27/19  1908 12/30/19  0903   Vancomycin-Trough ug/mL 10.5 12.9       Pharmacy will continue to follow and monitor vancomycin.    Please contact pharmacy at extension [phone] for questions regarding this assessment.    Thank you for the consult,   Lloyd Cheng       Patient brief summary:  Alessandro Cerna is a 64 y.o. male initiated on antimicrobial therapy with IV Vancomycin for treatment of skin & soft tissue infection    The patient's current regimen is vancomycin 1000mg IV q12h    Drug Allergies:   Review of patient's allergies indicates:  No Known Allergies    Actual Body Weight:   76.8kg    Renal Function:   Estimated Creatinine Clearance: 71.6 mL/min (based on SCr of 1 mg/dL).,     Dialysis Method (if applicable):      CBC (last 72 hours):  Recent Labs   Lab Result Units 12/28/19  0508 12/29/19  0546 12/30/19  0457   WBC K/uL 7.42 6.11 6.05   Hemoglobin g/dL 13.4* 13.6* 13.6*   Hematocrit % 40.2 40.4 41.1   Platelets K/uL 252 277 289   Gran% % 60.3 51.2 52.2   Lymph% % 27.2 32.9 32.4   Mono% % 11.5 12.9 11.9   Eosinophil% % 0.7 2.5 3.0   Basophil% % 0.3 0.5 0.5   Differential Method  Automated Automated Automated       Metabolic Panel (last 72 hours):  Recent Labs   Lab Result Units 12/28/19  0508 12/29/19  0546 12/30/19  0455   Sodium mmol/L 139 140 140   Potassium mmol/L 5.4* 4.2 3.9   Chloride mmol/L 102 102 104   CO2 mmol/L 27 29 25   Glucose mg/dL 104 102 95   BUN, Bld mg/dL 9 10 11   Creatinine  mg/dL 1.0 1.0 1.0   Magnesium mg/dL 2.2 2.2 2.2       Vancomycin Administrations:  vancomycin given in the last 96 hours                     vancomycin in dextrose 5 % 1 gram/250 mL IVPB 1,000 mg (mg) 1,000 mg New Bag 12/30/19 0929     1,000 mg New Bag 12/29/19 2148     1,000 mg New Bag  0814     1,000 mg New Bag 12/28/19 2230     1,000 mg New Bag  0842     1,000 mg New Bag 12/27/19 2140     1,000 mg New Bag  0845     1,000 mg New Bag 12/26/19 2041                      Microbiologic Results:  Microbiology Results (last 7 days)       Procedure Component Value Units Date/Time    Blood Culture #1 **CANNOT BE ORDERED STAT** [[id number]] Collected:  12/26/19 0857    Order Status:  Completed Specimen:  Blood from Peripheral, Antecubital, Right Updated:  12/29/19 1412     Blood Culture, Routine No Growth to date      No Growth to date      No Growth to date      No Growth to date    Blood Culture #2 **CANNOT BE ORDERED STAT** [[id number]] Collected:  12/26/19 0909    Order Status:  Completed Specimen:  Blood from Peripheral, Forearm, Right Updated:  12/29/19 1412     Blood Culture, Routine No Growth to date      No Growth to date      No Growth to date      No Growth to date

## 2019-12-31 ENCOUNTER — TELEPHONE (OUTPATIENT)
Dept: PODIATRY | Facility: CLINIC | Age: 64
End: 2019-12-31

## 2019-12-31 VITALS
OXYGEN SATURATION: 92 % | HEIGHT: 67 IN | DIASTOLIC BLOOD PRESSURE: 77 MMHG | HEART RATE: 73 BPM | WEIGHT: 179.88 LBS | TEMPERATURE: 97 F | RESPIRATION RATE: 18 BRPM | BODY MASS INDEX: 28.23 KG/M2 | SYSTOLIC BLOOD PRESSURE: 115 MMHG

## 2019-12-31 LAB
ANION GAP SERPL CALC-SCNC: 14 MMOL/L (ref 8–16)
BACTERIA BLD CULT: NORMAL
BACTERIA BLD CULT: NORMAL
BASOPHILS # BLD AUTO: 0.03 K/UL (ref 0–0.2)
BASOPHILS NFR BLD: 0.5 % (ref 0–1.9)
BUN SERPL-MCNC: 13 MG/DL (ref 8–23)
CALCIUM SERPL-MCNC: 8.9 MG/DL (ref 8.7–10.5)
CHLORIDE SERPL-SCNC: 102 MMOL/L (ref 95–110)
CO2 SERPL-SCNC: 24 MMOL/L (ref 23–29)
CREAT SERPL-MCNC: 1 MG/DL (ref 0.5–1.4)
CRP SERPL-MCNC: 28 MG/L (ref 0–8.2)
DIFFERENTIAL METHOD: ABNORMAL
EOSINOPHIL # BLD AUTO: 0.2 K/UL (ref 0–0.5)
EOSINOPHIL NFR BLD: 3.1 % (ref 0–8)
ERYTHROCYTE [DISTWIDTH] IN BLOOD BY AUTOMATED COUNT: 12.9 % (ref 11.5–14.5)
ERYTHROCYTE [SEDIMENTATION RATE] IN BLOOD BY WESTERGREN METHOD: 53 MM/HR (ref 0–10)
EST. GFR  (AFRICAN AMERICAN): >60 ML/MIN/1.73 M^2
EST. GFR  (NON AFRICAN AMERICAN): >60 ML/MIN/1.73 M^2
GLUCOSE SERPL-MCNC: 96 MG/DL (ref 70–110)
HCT VFR BLD AUTO: 40.1 % (ref 40–54)
HGB BLD-MCNC: 13.4 G/DL (ref 14–18)
LYMPHOCYTES # BLD AUTO: 1.9 K/UL (ref 1–4.8)
LYMPHOCYTES NFR BLD: 33.1 % (ref 18–48)
MAGNESIUM SERPL-MCNC: 2.2 MG/DL (ref 1.6–2.6)
MCH RBC QN AUTO: 30.7 PG (ref 27–31)
MCHC RBC AUTO-ENTMCNC: 33.4 G/DL (ref 32–36)
MCV RBC AUTO: 92 FL (ref 82–98)
MONOCYTES # BLD AUTO: 0.7 K/UL (ref 0.3–1)
MONOCYTES NFR BLD: 12.4 % (ref 4–15)
NEUTROPHILS # BLD AUTO: 2.9 K/UL (ref 1.8–7.7)
NEUTROPHILS NFR BLD: 50.9 % (ref 38–73)
PLATELET # BLD AUTO: 318 K/UL (ref 150–350)
PMV BLD AUTO: 8.7 FL (ref 9.2–12.9)
POTASSIUM SERPL-SCNC: 4.1 MMOL/L (ref 3.5–5.1)
RBC # BLD AUTO: 4.36 M/UL (ref 4.6–6.2)
SODIUM SERPL-SCNC: 140 MMOL/L (ref 136–145)
WBC # BLD AUTO: 5.74 K/UL (ref 3.9–12.7)

## 2019-12-31 PROCEDURE — 90686 IIV4 VACC NO PRSV 0.5 ML IM: CPT | Performed by: HOSPITALIST

## 2019-12-31 PROCEDURE — 85652 RBC SED RATE AUTOMATED: CPT

## 2019-12-31 PROCEDURE — 80048 BASIC METABOLIC PNL TOTAL CA: CPT

## 2019-12-31 PROCEDURE — 36415 COLL VENOUS BLD VENIPUNCTURE: CPT

## 2019-12-31 PROCEDURE — 86140 C-REACTIVE PROTEIN: CPT

## 2019-12-31 PROCEDURE — 90471 IMMUNIZATION ADMIN: CPT | Performed by: HOSPITALIST

## 2019-12-31 PROCEDURE — 63600175 PHARM REV CODE 636 W HCPCS: Performed by: HOSPITALIST

## 2019-12-31 PROCEDURE — 97161 PT EVAL LOW COMPLEX 20 MIN: CPT

## 2019-12-31 PROCEDURE — 85025 COMPLETE CBC W/AUTO DIFF WBC: CPT

## 2019-12-31 PROCEDURE — 83735 ASSAY OF MAGNESIUM: CPT

## 2019-12-31 PROCEDURE — 97116 GAIT TRAINING THERAPY: CPT

## 2019-12-31 RX ORDER — IBUPROFEN 600 MG/1
600 TABLET ORAL 3 TIMES DAILY
Qty: 30 TABLET | Refills: 0 | Status: SHIPPED | OUTPATIENT
Start: 2019-12-31 | End: 2020-01-09

## 2019-12-31 RX ORDER — AMOXICILLIN AND CLAVULANATE POTASSIUM 875; 125 MG/1; MG/1
1 TABLET, FILM COATED ORAL 2 TIMES DAILY
Qty: 28 TABLET | Refills: 0 | Status: SHIPPED | OUTPATIENT
Start: 2019-12-31 | End: 2020-01-15

## 2019-12-31 RX ORDER — IBUPROFEN 600 MG/1
600 TABLET ORAL EVERY 8 HOURS PRN
Qty: 30 TABLET | Refills: 0 | Status: SHIPPED | OUTPATIENT
Start: 2019-12-31 | End: 2020-01-09

## 2019-12-31 RX ADMIN — VANCOMYCIN HYDROCHLORIDE 1000 MG: 1 INJECTION, POWDER, LYOPHILIZED, FOR SOLUTION INTRAVENOUS at 08:12

## 2019-12-31 RX ADMIN — PIPERACILLIN SODIUM AND TAZOBACTAM SODIUM 4.5 G: 4; .5 INJECTION, POWDER, LYOPHILIZED, FOR SOLUTION INTRAVENOUS at 01:12

## 2019-12-31 RX ADMIN — PIPERACILLIN SODIUM AND TAZOBACTAM SODIUM 4.5 G: 4; .5 INJECTION, POWDER, LYOPHILIZED, FOR SOLUTION INTRAVENOUS at 09:12

## 2019-12-31 NOTE — SUBJECTIVE & OBJECTIVE
Subjective:     Interval History:   Pt seen at bedside s/p I&D with bone biopsy POD1. Pt denies pain. Has crutches at bedside and is in a [employer] shoe. No pedal complaints.       Scheduled Meds:   enoxaparin  40 mg Subcutaneous Daily    piperacillin-tazobactam (ZOSYN) IVPB  4.5 g Intravenous Q8H    vancomycin (VANCOCIN) IVPB  1,000 mg Intravenous Q12H     Continuous Infusions:  PRN Meds:acetaminophen, acetaminophen, influenza, melatonin, ondansetron, oxyCODONE, sodium chloride 0.9%, Pharmacy to dose Vancomycin consult **AND** vancomycin - pharmacy to dose    Review of Systems   Constitutional: Negative for appetite change, chills, diaphoresis and fever.   Respiratory: Negative for shortness of breath.    Cardiovascular: Positive for leg swelling.   Gastrointestinal: Negative for nausea and vomiting.   Musculoskeletal: Negative for arthralgias, back pain, gait problem, joint swelling and myalgias.   Skin: Positive for color change. Negative for pallor, rash and wound.   Neurological: Negative for numbness.     Objective:     Vital Signs (Most Recent):  Temp: 97.2 °F (36.2 °C) (12/31/19 1253)  Pulse: 73 (12/31/19 1253)  Resp: 18 (12/31/19 1253)  BP: 115/77 (12/31/19 1253)  SpO2: (!) 92 % (12/31/19 1253) Vital Signs (24h Range):  Temp:  [97.1 °F (36.2 °C)-99.1 °F (37.3 °C)] 97.2 °F (36.2 °C)  Pulse:  [56-85] 73  Resp:  [14-20] 18  SpO2:  [92 %-100 %] 92 %  BP: (103-142)/(57-88) 115/77     Weight: 81.6 kg (179 lb 14.3 oz)  Body mass index is 28.18 kg/m².    Foot Exam    Left Foot/Ankle      Inspection and Palpation  Left foot ecchymosis: Lateral reafoot.  Tenderness: calcaneus tenderness   Swelling: (Lateral heel)  Skin Exam: cellulitis, abnormal color and erythema; no drainage, no maceration and no ulcer     Neurovascular  Dorsalis pedis: 2+  Posterior tibial: 2+  Saphenous nerve sensation: normal  Tibial nerve sensation: normal  Superficial peroneal nerve sensation: normal  Deep peroneal nerve sensation:  normal  Sural nerve sensation: normal            Laboratory:  A1C: No results for input(s): HGBA1C in the last 4320 hours.  CBC:   Recent Labs   Lab 12/31/19 0441   WBC 5.74   RBC 4.36*   HGB 13.4*   HCT 40.1      MCV 92   MCH 30.7   MCHC 33.4     CMP:   Recent Labs   Lab 12/26/19  0857  12/31/19 0441   *   < > 96   CALCIUM 9.5   < > 8.9   ALBUMIN 4.1  --   --    PROT 8.6*  --   --       < > 140   K 3.6   < > 4.1   CO2 23   < > 24      < > 102   BUN 9   < > 13   CREATININE 1.0   < > 1.0   ALKPHOS 86  --   --    ALT 13  --   --    AST 15  --   --    BILITOT 1.6*  --   --     < > = values in this interval not displayed.     CRP:   Recent Labs   Lab 12/31/19 0441   CRP 28.0*     ESR:   Recent Labs   Lab 12/31/19 0441   SEDRATE 53*     Wound Cultures:   Recent Labs   Lab 12/30/19 1310   LABAERO No significant isolate to date  No growth     Microbiology Results (last 7 days)     Procedure Component Value Units Date/Time    AFB Culture & Smear [[id number]] Collected:  12/30/19 1310    Order Status:  Completed Specimen:  Incision site from Foot, Left Updated:  12/31/19 1306     AFB CULTURE STAIN No acid fast bacilli seen.    Narrative:       Culture    AFB Culture & Smear [[id number]] Collected:  12/30/19 1310    Order Status:  Completed Specimen:  Incision site from Foot, Left Updated:  12/31/19 1306     AFB CULTURE STAIN No acid fast bacilli seen.    Narrative:       Bone biopsy    Aerobic culture [[id number]] Collected:  12/30/19 1310    Order Status:  Completed Specimen:  Incision site from Foot, Left Updated:  12/31/19 1126     Aerobic Bacterial Culture No significant isolate to date    Narrative:       Culture    Fungus culture [[id number]] Collected:  12/30/19 1310    Order Status:  Completed Specimen:  Incision site from Foot, Left Updated:  12/31/19 1020     Fungus (Mycology) Culture Culture in progress    Narrative:       Bone biopsy    Fungus culture [[id number]] Collected:  12/30/19  1310    Order Status:  Completed Specimen:  Incision site from Foot, Left Updated:  12/31/19 1020     Fungus (Mycology) Culture Culture in progress    Narrative:       Culture    Culture, Anaerobe [[id number]] Collected:  12/30/19 1310    Order Status:  Completed Specimen:  Incision site from Foot, Left Updated:  12/31/19 0703     Anaerobic Culture Culture in progress    Narrative:       culture    Culture, Anaerobe [[id number]] Collected:  12/30/19 1310    Order Status:  Completed Specimen:  Incision site from Foot, Left Updated:  12/31/19 0703     Anaerobic Culture Culture in progress    Narrative:       Bone biopsy    Aerobic culture [[id number]] Collected:  12/30/19 1310    Order Status:  Completed Specimen:  Incision site from Foot, Left Updated:  12/31/19 0658     Aerobic Bacterial Culture No growth    Narrative:       Bone biopsy    Gram stain [[id number]] Collected:  12/30/19 1310    Order Status:  Completed Specimen:  Incision site from Foot, Left Updated:  12/30/19 2011     Gram Stain Result No WBC's      No organisms seen    Narrative:       Bone biopsy    Gram stain [[id number]] Collected:  12/30/19 1310    Order Status:  Completed Specimen:  Incision site from Foot, Left Updated:  12/30/19 1933     Gram Stain Result Rare WBC's      Rare Gram positive cocci    Narrative:       Culture    Blood Culture #2 **CANNOT BE ORDERED STAT** [[id number]] Collected:  12/26/19 0909    Order Status:  Completed Specimen:  Blood from Peripheral, Forearm, Right Updated:  12/30/19 1412     Blood Culture, Routine No Growth to date      No Growth to date      No Growth to date      No Growth to date      No Growth to date    Blood Culture #1 **CANNOT BE ORDERED STAT** [[id number]] Collected:  12/26/19 0857    Order Status:  Completed Specimen:  Blood from Peripheral, Antecubital, Right Updated:  12/30/19 1412     Blood Culture, Routine No Growth to date      No Growth to date      No Growth to date      No Growth to date      No  Growth to date        Specimen (12h ago, onward)    None          Diagnostic Results:  I have reviewed all pertinent imaging results/findings within the past 24 hours.   Imaging Results          X-Ray Foot Complete Left (Final result)  Result time 12/26/19 09:26:22    Final result by Tari Montes MD (12/26/19 09:26:22)                 Impression:      Mild soft tissue swelling overlying the lateral malleolus, no acute process seen      Electronically signed by: Tari Montes MD  Date:    12/26/2019  Time:    09:26             Narrative:    EXAMINATION:  XR FOOT COMPLETE 3 VIEW LEFT    CLINICAL HISTORY:  .  Pain in left foot    TECHNIQUE:  AP, lateral and oblique views of the left foot were performed.    COMPARISON:  11/27/2019    FINDINGS:  The alignment and mineralization appear normal.  No fracture seen, no osseous lesions.  No advanced degenerative change.  Soft tissue swelling overlying the lateral malleolus.                                  Clinical Findings:    Localized erythema that seems to be resolving, sutures intact and skin is well coapted.     12/31 12/28 12/27

## 2019-12-31 NOTE — PLAN OF CARE
VN reviewed discharge instructions with pt. AVS printed and handed to pt by bedside nurse. Reviewed follow-up appointments, medications, diet, and importance of medication compliance. Reviewed home care instructions, treatment plan, self-management, and when to seek medical attention. Allowed time for questions. All questions answered. Patient verbalized complete understanding of discharge instructions and voices no concerns.    Discharge instructions complete. Transport/wheelchair requested. Bedside nurse notified.

## 2019-12-31 NOTE — PLAN OF CARE
Patient ordered to be discharged. VN and  have signed off on case. Patient's telemetry and PIV removed. VN notified that patient is ready for discharge instructions. Patient stable and denies further needs at this time. Patient did refuse flu shot. Education provided.     Appropriate staff notified of patient's discharge status.

## 2019-12-31 NOTE — SUBJECTIVE & OBJECTIVE
Interval History: DPM did evacuate the bloody collection in the left rear ankle area    Review of Systems   Constitutional: Negative for activity change, chills and fatigue.   Gastrointestinal: Negative for constipation, diarrhea and vomiting.   Musculoskeletal: Positive for arthralgias (left foot ankle-controlled).   Neurological: Negative for dizziness and numbness.   Psychiatric/Behavioral: Negative for agitation. The patient is nervous/anxious.      Objective:     Vital Signs (Most Recent):  Temp: 97.1 °F (36.2 °C) (12/30/19 1345)  Pulse: 64 (12/30/19 1345)  Resp: 20 (12/30/19 1345)  BP: (!) 142/74 (12/30/19 1345)  SpO2: 100 % (12/30/19 1345) Vital Signs (24h Range):  Temp:  [96.2 °F (35.7 °C)-98.1 °F (36.7 °C)] 97.1 °F (36.2 °C)  Pulse:  [55-78] 64  Resp:  [14-20] 20  SpO2:  [94 %-100 %] 100 %  BP: (121-142)/(72-88) 142/74     Weight: 76.8 kg (169 lb 5 oz)  Body mass index is 26.52 kg/m².    Intake/Output Summary (Last 24 hours) at 12/30/2019 1816  Last data filed at 12/30/2019 1700  Gross per 24 hour   Intake 1125 ml   Output 850 ml   Net 275 ml      Physical Exam   Constitutional: He appears well-developed and well-nourished.   HENT:   Head: Normocephalic and atraumatic.   Eyes: EOM are normal.   Neck: Neck supple.   Cardiovascular: Normal rate.   Pulmonary/Chest: Effort normal. No respiratory distress.   Abdominal: There is no guarding.   Musculoskeletal: Normal range of motion. He exhibits deformity (post op dressing on the left foot ankle heel area ).   Skin: Skin is dry.   Psychiatric: He has a normal mood and affect. His behavior is normal. Judgment and thought content normal.   Nursing note and vitals reviewed.      Significant Labs:   Recent Labs   Lab 12/28/19  0508 12/29/19  0546 12/30/19  0457   WBC 7.42 6.11 6.05   HGB 13.4* 13.6* 13.6*   HCT 40.2 40.4 41.1    277 289     Recent Labs   Lab 12/28/19  0508 12/29/19  0546 12/30/19  0455    140 140   K 5.4* 4.2 3.9    102 104   CO2  27 29 25   BUN 9 10 11   CREATININE 1.0 1.0 1.0    102 95   CALCIUM 9.2 9.2 9.1   MG 2.2 2.2 2.2     Recent Labs   Lab 12/26/19  0857   ALKPHOS 86   ALT 13   AST 15   ALBUMIN 4.1   PROT 8.6*   BILITOT 1.6*      No results for input(s): CPK, CPKMB, MB, TROPONINI in the last 72 hours.  No results for input(s): POCTGLUCOSE in the last 168 hours.  Hemoglobin A1C   Date Value Ref Range Status   04/05/2019 5.1 4.0 - 5.6 % Final     Comment:     ADA Screening Guidelines:  5.7-6.4%  Consistent with prediabetes  >or=6.5%  Consistent with diabetes  High levels of fetal hemoglobin interfere with the HbA1C  assay. Heterozygous hemoglobin variants (HbS, HgC, etc)do  not significantly interfere with this assay.   However, presence of multiple variants may affect accuracy.     11/16/2017 5.0 4.0 - 5.6 % Final     Comment:     According to ADA guidelines, hemoglobin A1c <7.0% represents  optimal control in non-pregnant diabetic patients. Different  metrics may apply to specific patient populations.   Standards of Medical Care in Diabetes-2016.  For the purpose of screening for the presence of diabetes:  <5.7%     Consistent with the absence of diabetes  5.7-6.4%  Consistent with increasing risk for diabetes   (prediabetes)  >or=6.5%  Consistent with diabetes  Currently, no consensus exists for use of hemoglobin A1c  for diagnosis of diabetes for children.  This Hemoglobin A1c assay has significant interference with fetal   hemoglobin   (HbF). The results are invalid for patients with abnormal amounts of   HbF,   including those with known Hereditary Persistence   of Fetal Hemoglobin. Heterozygous hemoglobin variants (HbAS, HbAC,   HbAD, HbAE, HbA2) do not significantly interfere with this assay;   however, presence of multiple variants in a sample may impact the %   interference.       Scheduled Meds:   enoxaparin  40 mg Subcutaneous Daily    piperacillin-tazobactam (ZOSYN) IVPB  4.5 g Intravenous Q8H    vancomycin  (VANCOCIN) IVPB  1,000 mg Intravenous Q12H     Continuous Infusions:  As Needed: acetaminophen, acetaminophen, influenza, melatonin, ondansetron, oxyCODONE, sodium chloride 0.9%, Pharmacy to dose Vancomycin consult **AND** vancomycin - pharmacy to dose    Significant Imaging:   No new imaging today

## 2019-12-31 NOTE — ASSESSMENT & PLAN NOTE
-Pt s/p I&D with bone biopsy, erythema appears to be improving  -Cultures pending, ABX per ID, awaiting ID final recs  -Foot dressed with betadine, 4x4, kerlix, and ACE and placed in [employer] shoe. Pt to keep dressings clean, dry, and intact until post op visit.  -Pt PWB to heel only, pt has crutches at bedside per PT  -Pt to follow up with me within 1 week of D/C.

## 2019-12-31 NOTE — ASSESSMENT & PLAN NOTE
12/27 consulted podiatry for help in management  12/28 DPM on board.  MRI rear foot ordered  12/29 abnormal MRI rear foot, see above  12/30 F/U cultures and ID inputs/recs.  Explained to pt possible poc and possible IV abx for home

## 2019-12-31 NOTE — PT/OT/SLP EVAL
Physical Therapy Evaluation and Discharge Note    Patient Name:  Alessandro Cerna   MRN:  [id number]    Recommendations:     Discharge Recommendations:  home   Discharge Equipment Recommendations: crutches, axillary   Barriers to discharge: None    Assessment:     Alessandro Cerna is a 64 y.o. male admitted with a medical diagnosis of Cellulitis of left foot. .  At this time, patient is functioning at their prior level of function and does not require further acute PT services.     Recent Surgery: Procedure(s) (LRB):  IRRIGATION AND DEBRIDEMENT, LOWER EXTREMITY Bone biopsy heel (Left) 1 Day Post-Op    Plan:     During this hospitalization, patient does not require further acute PT services.  Please re-consult if situation changes.      Subjective     Chief Complaint: none voiced  Patient/Family Comments/goals: go home  Pain/Comfort:  · Pain Rating 1: other (see comments)(did not rate on scale)  · Location - Side 1: Left  · Location - Orientation 1: generalized  · Location 1: foot  · Pain Addressed 1: Reposition, Distraction    Patients cultural, spiritual, Church conflicts given the current situation:      Living Environment:  Lives with spouse H no concerns  Prior to admission, patients level of function was independent.  Equipment used at home: none.  DME owned (not currently used): none.  Upon discharge, patient will have assistance from family.    Objective:     Communicated with primary nurse prior to session.  Patient found up in chair with telemetry, peripheral IV upon PT entry to room.    General Precautions: Standard, fall   Orthopedic Precautions:LLE partial weight bearing   Braces: N/A     Exams:  · RLE ROM: WFL  · RLE Strength: WFL  · LLE ROM: WFL except foot and ankle  · LLE Strength: WFL except foot and ankle     Functional Mobility:  · Bed Mobility:     · Supine to Sit: modified independence  · Sit to Supine: modified independence  · Transfers:     · Sit to Stand:  modified independence  with axillary crutches  · Gait: 150 ft with Mod I axillary crutches  · Balance: fair + with axillary crutches    AM-PAC 6 CLICK MOBILITY  Total Score:22       Therapeutic Activities and Exercises:   na    AM-PAC 6 CLICK MOBILITY  Total Score:22     Patient left up in chair with all lines intact, call button in reach and family present.    GOALS:   Multidisciplinary Problems     Physical Therapy Goals     Not on file          Multidisciplinary Problems (Resolved)        Problem: Physical Therapy Goal    Goal Priority Disciplines Outcome Goal Variances Interventions   Physical Therapy Goal   (Resolved)     PT, PT/OT Met     Description:  Goals to be met by: 12/31/2019     No PT goals established                          History:     Past Medical History:   Diagnosis Date    Chronic seasonal allergic rhinitis 11/16/2017    GERD (gastroesophageal reflux disease)     TB (pulmonary tuberculosis) 1982    Treated [facility] 9 mo therapy.       Past Surgical History:   Procedure Laterality Date    IRRIGATION AND DEBRIDEMENT OF LOWER EXTREMITY Left 12/30/2019    Procedure: IRRIGATION AND DEBRIDEMENT, LOWER EXTREMITY Bone biopsy heel;  Surgeon: Karo Marsh MD;  Location: [facility];  Service: Podiatry;  Laterality: Left;       Time Tracking:     PT Received On: 12/31/19  PT Start Time: 1015     PT Stop Time: 1040  PT Total Time (min): 25 min     Billable Minutes: Evaluation 10 and Gait Training 15      David Morales, PT  12/31/2019

## 2019-12-31 NOTE — PLAN OF CARE
Rounds completed on pt.  All questions addressed.  Bedside nurse to discuss d/c medications.  Discussed importance to attend all f/u appts and take medications as prescribed.  Verbalized understanding.       Follow up with Jamaal Cabrera, DO   The office will call you to set up appointment.  [address] [facility]   [place] [address]   [phone]    [id number] Established Patient Visit with Melissa Cobb DPM   Thursday Jan 9, 2020 10:30 AM   Arrive at check-in approximately 15 minutes before your scheduled appointment time. Bring all outside medical records and imaging, along with a list of your current medications and insurance card.  Suite    Parking: [address]   [address], [facility] [id number]  [place] [id number]   [phone]         12/31/19 1510   Final Note   Assessment Type Final Discharge Note   Anticipated Discharge Disposition Home   Hospital Follow Up  Appt(s) scheduled? Yes   Discharge plans and expectations educations in teach back method with documentation complete? Yes   Right Care Referral Info   Post Acute Recommendation No Care     Jasen Pimentel, RN,   [phone]

## 2019-12-31 NOTE — SUBJECTIVE & OBJECTIVE
Interval History: awake and alert, requesting to be discharge.   Appreciates ID rec's . Augmentin x 2 weeks.        Review of Systems   Constitutional: Negative for activity change, chills and fatigue.   Gastrointestinal: Negative for constipation, diarrhea and vomiting.   Musculoskeletal: Positive for arthralgias.   Neurological: Negative for dizziness and numbness.   Psychiatric/Behavioral: Negative for agitation.     Objective:     Vital Signs (Most Recent):  Temp: 97.2 °F (36.2 °C) (12/31/19 1253)  Pulse: 73 (12/31/19 1253)  Resp: 18 (12/31/19 1253)  BP: 115/77 (12/31/19 1253)  SpO2: (!) 92 % (12/31/19 1253) Vital Signs (24h Range):  Temp:  [97.2 °F (36.2 °C)-99.1 °F (37.3 °C)] 97.2 °F (36.2 °C)  Pulse:  [63-85] 73  Resp:  [16-18] 18  SpO2:  [92 %-98 %] 92 %  BP: (103-115)/(57-77) 115/77     Weight: 81.6 kg (179 lb 14.3 oz)  Body mass index is 28.18 kg/m².    Intake/Output Summary (Last 24 hours) at 12/31/2019 1404  Last data filed at 12/31/2019 0100  Gross per 24 hour   Intake 800 ml   Output 600 ml   Net 200 ml      Physical Exam   Constitutional: He appears well-developed and well-nourished.   HENT:   Head: Normocephalic and atraumatic.   Eyes: EOM are normal.   Neck: Neck supple.   Cardiovascular: Normal rate.   Pulmonary/Chest: Effort normal. No respiratory distress.   Abdominal: There is no guarding.   Musculoskeletal: Normal range of motion. He exhibits deformity (post op dressing on the left foot ankle heel area ).   Skin: Skin is dry.   Psychiatric: He has a normal mood and affect. His behavior is normal. Judgment and thought content normal.   Nursing note and vitals reviewed.      Significant Labs:   Recent Labs   Lab 12/29/19  0546 12/30/19  0457 12/31/19  0441   WBC 6.11 6.05 5.74   HGB 13.6* 13.6* 13.4*   HCT 40.4 41.1 40.1    289 318     Recent Labs   Lab 12/29/19  0546 12/30/19  0455 12/31/19  0441    140 140   K 4.2 3.9 4.1    104 102   CO2 29 25 24   BUN 10 11 13   CREATININE  1.0 1.0 1.0    95 96   CALCIUM 9.2 9.1 8.9   MG 2.2 2.2 2.2     Recent Labs   Lab 12/26/19  0857   ALKPHOS 86   ALT 13   AST 15   ALBUMIN 4.1   PROT 8.6*   BILITOT 1.6*      No results for input(s): CPK, CPKMB, MB, TROPONINI in the last 72 hours.  No results for input(s): POCTGLUCOSE in the last 168 hours.  Hemoglobin A1C   Date Value Ref Range Status   04/05/2019 5.1 4.0 - 5.6 % Final     Comment:     ADA Screening Guidelines:  5.7-6.4%  Consistent with prediabetes  >or=6.5%  Consistent with diabetes  High levels of fetal hemoglobin interfere with the HbA1C  assay. Heterozygous hemoglobin variants (HbS, HgC, etc)do  not significantly interfere with this assay.   However, presence of multiple variants may affect accuracy.     11/16/2017 5.0 4.0 - 5.6 % Final     Comment:     According to ADA guidelines, hemoglobin A1c <7.0% represents  optimal control in non-pregnant diabetic patients. Different  metrics may apply to specific patient populations.   Standards of Medical Care in Diabetes-2016.  For the purpose of screening for the presence of diabetes:  <5.7%     Consistent with the absence of diabetes  5.7-6.4%  Consistent with increasing risk for diabetes   (prediabetes)  >or=6.5%  Consistent with diabetes  Currently, no consensus exists for use of hemoglobin A1c  for diagnosis of diabetes for children.  This Hemoglobin A1c assay has significant interference with fetal   hemoglobin   (HbF). The results are invalid for patients with abnormal amounts of   HbF,   including those with known Hereditary Persistence   of Fetal Hemoglobin. Heterozygous hemoglobin variants (HbAS, HbAC,   HbAD, HbAE, HbA2) do not significantly interfere with this assay;   however, presence of multiple variants in a sample may impact the %   interference.       Scheduled Meds:   enoxaparin  40 mg Subcutaneous Daily    piperacillin-tazobactam (ZOSYN) IVPB  4.5 g Intravenous Q8H    vancomycin (VANCOCIN) IVPB  1,000 mg Intravenous Q12H      Continuous Infusions:  As Needed: acetaminophen, acetaminophen, influenza, melatonin, ondansetron, oxyCODONE, sodium chloride 0.9%, Pharmacy to dose Vancomycin consult **AND** vancomycin - pharmacy to dose    Significant Imaging:   No new imaging today

## 2019-12-31 NOTE — PROGRESS NOTES
[facility] Hospital Medicine  Progress Note    Patient Name: Alessanrdo Cerna  MRN: [id number]  Patient Class: IP- Inpatient   Admission Date: 12/26/2019  Length of Stay: 2 days  Attending Physician: Palak King*  Primary Care Provider: Jamaal Cabrera DO        Subjective:     Principal Problem:Cellulitis of left foot        HPI:  The patient is a 64 years old male pt with PMH significant for GERD on no medication, TB in 1982, S/P treatment, otherwise the pt is healthy.  The pt presented to ED with ongoing left ankle calcanuous edema and pain.  As per pt, he had plantar fascitis of the left foot, for which he was seen and evaluated by a (podiatrist) and had steroid injection at the medial part of the foot on 11/20, since then he had swelling and edema, pain in the left foot, he was seen by the MD who performed the injection and was told it is non infectiouis process.   The pt came to ED on 11/27 and was given 5 days worth of antibiotics Clindamycin (proably as pt recalls) and then was seen by his PCP who prescribed keflex for 5 days, he had some resolution but edema is back.  Pt also had MRI a month ago showing edema of the area of the fascia and possible the muscle.    The pt denies having medication at home, he is healthy otherwise.  Denies smokinh  He is a social drinker a drink or two a week.  Family hx non contributory, no DM that he knows of .    Pt to be placed in obs, start broad spectrum ABX and MRI of the left foot    Overview/Hospital Course:  12/27 pt doing ok, MRI no osteo, discussing with podiatry, and pt had pain in left tigh , no DVT on US  12/28 pt seen, he is clinically stable awaiting the rearfoot MRI to be completed, appreciate podiatry help in setting it up today. Cultures still NGTD. Will continue IV abx, awaiting MRI and recs by podiatry.  12/29 pt seen, he still feeling ok, no fever, the pt need to be evaluated by podiatry and ID for the possible fluid  collection/abscess/ start of osteo in th rear left foot  12/30 pt seen, he had I&D and left rear foot evacuation of the blood collection, cultures intra-op were collected. ID recommended continue vacn and zosyn for now. explaied to pt if we get a result of culture we might be able to zoom in on abx for home use and possible need for PICC line if ID recommend extended IV abx  12/32 appreciate ID rec-s discharge on Augment x 2 weeks    Interval History: awake and alert, requesting to be discharge.   Appreciates ID rec's . Augmentin x 2 weeks.        Review of Systems   Constitutional: Negative for activity change, chills and fatigue.   Gastrointestinal: Negative for constipation, diarrhea and vomiting.   Musculoskeletal: Positive for arthralgias.   Neurological: Negative for dizziness and numbness.   Psychiatric/Behavioral: Negative for agitation.     Objective:     Vital Signs (Most Recent):  Temp: 97.2 °F (36.2 °C) (12/31/19 1253)  Pulse: 73 (12/31/19 1253)  Resp: 18 (12/31/19 1253)  BP: 115/77 (12/31/19 1253)  SpO2: (!) 92 % (12/31/19 1253) Vital Signs (24h Range):  Temp:  [97.2 °F (36.2 °C)-99.1 °F (37.3 °C)] 97.2 °F (36.2 °C)  Pulse:  [63-85] 73  Resp:  [16-18] 18  SpO2:  [92 %-98 %] 92 %  BP: (103-115)/(57-77) 115/77     Weight: 81.6 kg (179 lb 14.3 oz)  Body mass index is 28.18 kg/m².    Intake/Output Summary (Last 24 hours) at 12/31/2019 1404  Last data filed at 12/31/2019 0100  Gross per 24 hour   Intake 800 ml   Output 600 ml   Net 200 ml      Physical Exam   Constitutional: He appears well-developed and well-nourished.   HENT:   Head: Normocephalic and atraumatic.   Eyes: EOM are normal.   Neck: Neck supple.   Cardiovascular: Normal rate.   Pulmonary/Chest: Effort normal. No respiratory distress.   Abdominal: There is no guarding.   Musculoskeletal: Normal range of motion. He exhibits deformity (post op dressing on the left foot ankle heel area ).   Skin: Skin is dry.   Psychiatric: He has a normal mood and  affect. His behavior is normal. Judgment and thought content normal.   Nursing note and vitals reviewed.      Significant Labs:   Recent Labs   Lab 12/29/19  0546 12/30/19  0457 12/31/19  0441   WBC 6.11 6.05 5.74   HGB 13.6* 13.6* 13.4*   HCT 40.4 41.1 40.1    289 318     Recent Labs   Lab 12/29/19  0546 12/30/19  0455 12/31/19  0441    140 140   K 4.2 3.9 4.1    104 102   CO2 29 25 24   BUN 10 11 13   CREATININE 1.0 1.0 1.0    95 96   CALCIUM 9.2 9.1 8.9   MG 2.2 2.2 2.2     Recent Labs   Lab 12/26/19  0857   ALKPHOS 86   ALT 13   AST 15   ALBUMIN 4.1   PROT 8.6*   BILITOT 1.6*      No results for input(s): CPK, CPKMB, MB, TROPONINI in the last 72 hours.  No results for input(s): POCTGLUCOSE in the last 168 hours.  Hemoglobin A1C   Date Value Ref Range Status   04/05/2019 5.1 4.0 - 5.6 % Final     Comment:     ADA Screening Guidelines:  5.7-6.4%  Consistent with prediabetes  >or=6.5%  Consistent with diabetes  High levels of fetal hemoglobin interfere with the HbA1C  assay. Heterozygous hemoglobin variants (HbS, HgC, etc)do  not significantly interfere with this assay.   However, presence of multiple variants may affect accuracy.     11/16/2017 5.0 4.0 - 5.6 % Final     Comment:     According to ADA guidelines, hemoglobin A1c <7.0% represents  optimal control in non-pregnant diabetic patients. Different  metrics may apply to specific patient populations.   Standards of Medical Care in Diabetes-2016.  For the purpose of screening for the presence of diabetes:  <5.7%     Consistent with the absence of diabetes  5.7-6.4%  Consistent with increasing risk for diabetes   (prediabetes)  >or=6.5%  Consistent with diabetes  Currently, no consensus exists for use of hemoglobin A1c  for diagnosis of diabetes for children.  This Hemoglobin A1c assay has significant interference with fetal   hemoglobin   (HbF). The results are invalid for patients with abnormal amounts of   HbF,   including those with  known Hereditary Persistence   of Fetal Hemoglobin. Heterozygous hemoglobin variants (HbAS, HbAC,   HbAD, HbAE, HbA2) do not significantly interfere with this assay;   however, presence of multiple variants in a sample may impact the %   interference.       Scheduled Meds:   enoxaparin  40 mg Subcutaneous Daily    piperacillin-tazobactam (ZOSYN) IVPB  4.5 g Intravenous Q8H    vancomycin (VANCOCIN) IVPB  1,000 mg Intravenous Q12H     Continuous Infusions:  As Needed: acetaminophen, acetaminophen, influenza, melatonin, ondansetron, oxyCODONE, sodium chloride 0.9%, Pharmacy to dose Vancomycin consult **AND** vancomycin - pharmacy to dose    Significant Imaging:   No new imaging today      Assessment/Plan:      * Cellulitis of left foot  Possible from the plantar fascitis vs from the injection.  Pt had couple of rounds of oral abx, not clearing the infection/edema totally.    Will place on iv vanc and zosyn.  Will order MRI. Although he had one a month ago, but no resolution of the symptoms with abx.  Blood cultures ordered in ED, doubt goof results given the abx recent use  Might consider ID consult if needed or podiatry consult    12/27 no osteo on mri  Discuss with DPM  12/28 awaiting MRI rear foot.  Continue abx,  Neg cultures so far  12/29 MRI of rear foot concerning for early osteo,   Consulting ID, F/U with DPM  12/30 S/P intra-op culture collected.  Continue current vanc/zosyn- switch to Augmentin x 2 weeks per ID rec's   Appreciate DPM, ID inputs    Soft tissue infection  12/27 consulted podiatry for help in management  12/28 DPM on board.  MRI rear foot ordered  12/29 abnormal MRI rear foot, see above  12/30 F/U cultures and ID inputs/recs.  Explained to pt possible poc and possible IV abx for home     GERD (gastroesophageal reflux disease)  Not on meds at home  monitor      Edema of left foot  See above   12/30 S/P evacuation of bloody collection by podiatry    Pain in limb  Secondary to faciitis vs  inflammation vs infection.  Symptomatic tx.  tx underlying cause  12/27 no DVT on Us  12/28 improving slowly  12/29 controlled  12/30 post op pain controlled      VTE Risk Mitigation (From admission, onward)         Ordered     enoxaparin injection 40 mg  Daily      12/26/19 1432     IP VTE HIGH RISK PATIENT  Once      12/26/19 1627                      Palak LAITH King MD  Department of Hospital Medicine   [facility]

## 2019-12-31 NOTE — PROGRESS NOTES
[facility]  Podiatry  Progress Note    Patient Name: Alessandro Cerna  MRN: [id number]  Admission Date: 12/26/2019  Hospital Length of Stay: 2 days  Attending Physician: Palak King*  Primary Care Provider: Jamaal Cabrera DO     Subjective:     Interval History:   Pt seen at bedside s/p I&D with bone biopsy POD1. Pt denies pain. Has crutches at bedside and is in a DARCO shoe. No pedal complaints.       Scheduled Meds:   enoxaparin  40 mg Subcutaneous Daily    piperacillin-tazobactam (ZOSYN) IVPB  4.5 g Intravenous Q8H    vancomycin (VANCOCIN) IVPB  1,000 mg Intravenous Q12H     Continuous Infusions:  PRN Meds:acetaminophen, acetaminophen, influenza, melatonin, ondansetron, oxyCODONE, sodium chloride 0.9%, Pharmacy to dose Vancomycin consult **AND** vancomycin - pharmacy to dose    Review of Systems   Constitutional: Negative for appetite change, chills, diaphoresis and fever.   Respiratory: Negative for shortness of breath.    Cardiovascular: Positive for leg swelling.   Gastrointestinal: Negative for nausea and vomiting.   Musculoskeletal: Negative for arthralgias, back pain, gait problem, joint swelling and myalgias.   Skin: Positive for color change. Negative for pallor, rash and wound.   Neurological: Negative for numbness.     Objective:     Vital Signs (Most Recent):  Temp: 97.2 °F (36.2 °C) (12/31/19 1253)  Pulse: 73 (12/31/19 1253)  Resp: 18 (12/31/19 1253)  BP: 115/77 (12/31/19 1253)  SpO2: (!) 92 % (12/31/19 1253) Vital Signs (24h Range):  Temp:  [97.1 °F (36.2 °C)-99.1 °F (37.3 °C)] 97.2 °F (36.2 °C)  Pulse:  [56-85] 73  Resp:  [14-20] 18  SpO2:  [92 %-100 %] 92 %  BP: (103-142)/(57-88) 115/77     Weight: 81.6 kg (179 lb 14.3 oz)  Body mass index is 28.18 kg/m².    Foot Exam    Left Foot/Ankle      Inspection and Palpation  Left foot ecchymosis: Lateral reafoot.  Tenderness: calcaneus tenderness   Swelling: (Lateral heel)  Skin Exam: cellulitis, abnormal color and  erythema; no drainage, no maceration and no ulcer     Neurovascular  Dorsalis pedis: 2+  Posterior tibial: 2+  Saphenous nerve sensation: normal  Tibial nerve sensation: normal  Superficial peroneal nerve sensation: normal  Deep peroneal nerve sensation: normal  Sural nerve sensation: normal            Laboratory:  A1C: No results for input(s): HGBA1C in the last 4320 hours.  CBC:   Recent Labs   Lab 12/31/19 0441   WBC 5.74   RBC 4.36*   HGB 13.4*   HCT 40.1      MCV 92   MCH 30.7   MCHC 33.4     CMP:   Recent Labs   Lab 12/26/19  0857  12/31/19 0441   *   < > 96   CALCIUM 9.5   < > 8.9   ALBUMIN 4.1  --   --    PROT 8.6*  --   --       < > 140   K 3.6   < > 4.1   CO2 23   < > 24      < > 102   BUN 9   < > 13   CREATININE 1.0   < > 1.0   ALKPHOS 86  --   --    ALT 13  --   --    AST 15  --   --    BILITOT 1.6*  --   --     < > = values in this interval not displayed.     CRP:   Recent Labs   Lab 12/31/19 0441   CRP 28.0*     ESR:   Recent Labs   Lab 12/31/19 0441   SEDRATE 53*     Wound Cultures:   Recent Labs   Lab 12/30/19 1310   LABAERO No significant isolate to date  No growth     Microbiology Results (last 7 days)     Procedure Component Value Units Date/Time    AFB Culture & Smear [[id number]] Collected:  12/30/19 1310    Order Status:  Completed Specimen:  Incision site from Foot, Left Updated:  12/31/19 1306     AFB CULTURE STAIN No acid fast bacilli seen.    Narrative:       Culture    AFB Culture & Smear [[id number]] Collected:  12/30/19 1310    Order Status:  Completed Specimen:  Incision site from Foot, Left Updated:  12/31/19 1306     AFB CULTURE STAIN No acid fast bacilli seen.    Narrative:       Bone biopsy    Aerobic culture [[id number]] Collected:  12/30/19 1310    Order Status:  Completed Specimen:  Incision site from Foot, Left Updated:  12/31/19 1126     Aerobic Bacterial Culture No significant isolate to date    Narrative:       Culture    Fungus culture  [[id number]] Collected:  12/30/19 1310    Order Status:  Completed Specimen:  Incision site from Foot, Left Updated:  12/31/19 1020     Fungus (Mycology) Culture Culture in progress    Narrative:       Bone biopsy    Fungus culture [[id number]] Collected:  12/30/19 1310    Order Status:  Completed Specimen:  Incision site from Foot, Left Updated:  12/31/19 1020     Fungus (Mycology) Culture Culture in progress    Narrative:       Culture    Culture, Anaerobe [[id number]] Collected:  12/30/19 1310    Order Status:  Completed Specimen:  Incision site from Foot, Left Updated:  12/31/19 0703     Anaerobic Culture Culture in progress    Narrative:       culture    Culture, Anaerobe [[id number]] Collected:  12/30/19 1310    Order Status:  Completed Specimen:  Incision site from Foot, Left Updated:  12/31/19 0703     Anaerobic Culture Culture in progress    Narrative:       Bone biopsy    Aerobic culture [[id number]] Collected:  12/30/19 1310    Order Status:  Completed Specimen:  Incision site from Foot, Left Updated:  12/31/19 0658     Aerobic Bacterial Culture No growth    Narrative:       Bone biopsy    Gram stain [[id number]] Collected:  12/30/19 1310    Order Status:  Completed Specimen:  Incision site from Foot, Left Updated:  12/30/19 2011     Gram Stain Result No WBC's      No organisms seen    Narrative:       Bone biopsy    Gram stain [[id number]] Collected:  12/30/19 1310    Order Status:  Completed Specimen:  Incision site from Foot, Left Updated:  12/30/19 1933     Gram Stain Result Rare WBC's      Rare Gram positive cocci    Narrative:       Culture    Blood Culture #2 **CANNOT BE ORDERED STAT** [[id number]] Collected:  12/26/19 0909    Order Status:  Completed Specimen:  Blood from Peripheral, Forearm, Right Updated:  12/30/19 1412     Blood Culture, Routine No Growth to date      No Growth to date      No Growth to date      No Growth to date      No Growth to date    Blood Culture #1 **CANNOT BE ORDERED STAT**  [[id number]] Collected:  12/26/19 0857    Order Status:  Completed Specimen:  Blood from Peripheral, Antecubital, Right Updated:  12/30/19 1412     Blood Culture, Routine No Growth to date      No Growth to date      No Growth to date      No Growth to date      No Growth to date        Specimen (12h ago, onward)    None          Diagnostic Results:  I have reviewed all pertinent imaging results/findings within the past 24 hours.   Imaging Results          X-Ray Foot Complete Left (Final result)  Result time 12/26/19 09:26:22    Final result by Tari Montes MD (12/26/19 09:26:22)                 Impression:      Mild soft tissue swelling overlying the lateral malleolus, no acute process seen      Electronically signed by: Tari Montes MD  Date:    12/26/2019  Time:    09:26             Narrative:    EXAMINATION:  XR FOOT COMPLETE 3 VIEW LEFT    CLINICAL HISTORY:  .  Pain in left foot    TECHNIQUE:  AP, lateral and oblique views of the left foot were performed.    COMPARISON:  11/27/2019    FINDINGS:  The alignment and mineralization appear normal.  No fracture seen, no osseous lesions.  No advanced degenerative change.  Soft tissue swelling overlying the lateral malleolus.                                  Clinical Findings:    Localized erythema that seems to be resolving, sutures intact and skin is well coapted.     12/31 12/28 12/27          Assessment/Plan:     * Cellulitis of left foot  -Pt s/p I&D with bone biopsy, erythema appears to be improving  -Cultures pending, ABX per ID, awaiting ID final recs  -Foot dressed with betadine, 4x4, kerlix, and ACE and placed in DARCO shoe. Pt to keep dressings clean, dry, and intact until post op visit.  -Pt PWB to heel only, pt has crutches at bedside per PT  -Pt to follow up with me within 1 week of D/C.           Soft tissue infection  As above        Karo Marsh MD  Podiatry  [facility]

## 2019-12-31 NOTE — ASSESSMENT & PLAN NOTE
Possible from the plantar fascitis vs from the injection.  Pt had couple of rounds of oral abx, not clearing the infection/edema totally.    Will place on iv vanc and zosyn.  Will order MRI. Although he had one a month ago, but no resolution of the symptoms with abx.  Blood cultures ordered in ED, doubt goof results given the abx recent use  Might consider ID consult if needed or podiatry consult    12/27 no osteo on mri  Discuss with DPM  12/28 awaiting MRI rear foot.  Continue abx,  Neg cultures so far  12/29 MRI of rear foot concerning for early osteo,   Consulting ID, F/U with DPM  12/30 S/P intra-op culture collected.  Continue current vanc/zosyn- switch to Augmentin x 2 weeks per ID rec's   Appreciate DPM, ID inputs

## 2019-12-31 NOTE — DISCHARGE SUMMARY
[facility] Hospital Medicine  Discharge Summary      Patient Name: Alessandro Cerna  MRN: [id number]  Admission Date: 12/26/2019  Hospital Length of Stay: 2 days  Discharge Date and Time: 12/31/2019  4:54 PM  Attending Physician: Palak King*   Discharging Provider: Palak King MD  Primary Care Provider: Jamaal Cabrera DO      HPI:   The patient is a 64 years old male pt with PMH significant for GERD on no medication, TB in 1982, S/P treatment, otherwise the pt is healthy.  The pt presented to ED with ongoing left ankle calcanuous edema and pain.  As per pt, he had plantar fascitis of the left foot, for which he was seen and evaluated by a (podiatrist) and had steroid injection at the medial part of the foot on 11/20, since then he had swelling and edema, pain in the left foot, he was seen by the MD who performed the injection and was told it is non infectiouis process.   The pt came to ED on 11/27 and was given 5 days worth of antibiotics Clindamycin (proably as pt recalls) and then was seen by his PCP who prescribed keflex for 5 days, he had some resolution but edema is back.  Pt also had MRI a month ago showing edema of the area of the fascia and possible the muscle.    The pt denies having medication at home, he is healthy otherwise.  Denies smokinh  He is a social drinker a drink or two a week.  Family hx non contributory, no DM that he knows of .    Pt to be placed in obs, start broad spectrum ABX and MRI of the left foot    Procedure(s) (LRB):  IRRIGATION AND DEBRIDEMENT, LOWER EXTREMITY Bone biopsy heel (Left)      Hospital Course:   12/27 pt doing ok, MRI no osteo, discussing with podiatry, and pt had pain in left tigh , no DVT on US  12/28 pt seen, he is clinically stable awaiting the rearfoot MRI to be completed, appreciate podiatry help in setting it up today. Cultures still NGTD. Will continue IV abx, awaiting MRI and recs by podiatry.  12/29 pt seen, he  still feeling ok, no fever, the pt need to be evaluated by podiatry and ID for the possible fluid collection/abscess/ start of osteo in th rear left foot  12/30 pt seen, he had I&D and left rear foot evacuation of the blood collection, cultures intra-op were collected. ID recommended continue vacn and zosyn for now. explaied to pt if we get a result of culture we might be able to zoom in on abx for home use and possible need for PICC line if ID recommend extended IV abx  12/32 appreciate ID rec-s discharge on Augment x 2 weeks     Consults:   Consults (From admission, onward)        Status Ordering Provider     Inpatient consult to Infectious Diseases  Once     Provider:  Payton Granda MD    Completed ANG APARICIO     Inpatient consult to Podiatry  Once     Provider:  (Not yet assigned)    Completed ANG APARICIO     Pharmacy to dose Vancomycin consult  Once     Provider:  (Not yet assigned)    Acknowledged ANG APARICIO          * Cellulitis of left foot  Possible from the plantar fascitis vs from the injection.  Pt had couple of rounds of oral abx, not clearing the infection/edema totally.    Will place on iv vanc and zosyn.  Will order MRI. Although he had one a month ago, but no resolution of the symptoms with abx.  Blood cultures ordered in ED, doubt goof results given the abx recent use  Might consider ID consult if needed or podiatry consult    12/27 no osteo on mri  Discuss with DPM  12/28 awaiting MRI rear foot.  Continue abx,  Neg cultures so far  12/29 MRI of rear foot concerning for early osteo,   Consulting ID, F/U with DPM  12/30 S/P intra-op culture collected.  Continue current vanc/zosyn- switch to Augmentin x 2 weeks per ID rec's   Appreciate DPM, ID inputs    Soft tissue infection  12/27 consulted podiatry for help in management  12/28 DPM on board.  MRI rear foot ordered  12/29 abnormal MRI rear foot, see above  12/30 F/U cultures and ID inputs/recs.  Explained to pt possible poc and  possible IV abx for home     GERD (gastroesophageal reflux disease)  Not on meds at home  monitor      Pain in limb  Secondary to faciitis vs inflammation vs infection.  Symptomatic tx.  tx underlying cause  12/27 no DVT on Us  12/28 improving slowly  12/29 controlled  12/30 post op pain controlled      Final Active Diagnoses:    Diagnosis Date Noted POA    PRINCIPAL PROBLEM:  Cellulitis of left foot [L03.116] 12/05/2019 Yes    Edema of left foot [R60.0] 12/26/2019 Yes    GERD (gastroesophageal reflux disease) [K21.9] 12/26/2019 Yes    Soft tissue infection [L08.9] 12/26/2019 Yes    Pain in limb [M79.609] 09/24/2015 Yes      Problems Resolved During this Admission:       Discharged Condition: stable    Disposition:     Follow Up:    Patient Instructions:   No discharge procedures on file.    Significant Diagnostic Studies:     Pending Diagnostic Studies:     Procedure Component Value Units Date/Time    MRI Foot (Hindfoot) Left With Contrast [[id number]]     Order Status:  Sent Lab Status:  No result     Specimen to Pathology, Surgery Orthopedics [[id number]] Collected:  12/30/19 1310    Order Status:  Sent Lab Status:  In process Updated:  12/30/19 1606         Medications:  Reconciled Home Medications:      Medication List      START taking these medications    amoxicillin-clavulanate 875-125mg 875-125 mg per tablet  Commonly known as:  AUGMENTIN  Take 1 tablet by mouth 2 (two) times daily. for 14 days        CHANGE how you take these medications    * ibuprofen 600 MG tablet  Commonly known as:  ADVIL,MOTRIN  Take 1 tablet (600 mg total) by mouth every 8 (eight) hours as needed for Pain.  What changed:    · when to take this  · reasons to take this     * ibuprofen 600 MG tablet  Commonly known as:  ADVIL,MOTRIN  Take 1 tablet (600 mg total) by mouth 3 (three) times daily.  What changed:  You were already taking a medication with the same name, and this prescription was added. Make sure you understand how and  when to take each.         * This list has 2 medication(s) that are the same as other medications prescribed for you. Read the directions carefully, and ask your doctor or other care provider to review them with you.            CONTINUE taking these medications    diclofenac sodium 1 % Gel  Commonly known as:  VOLTAREN  Apply 2 g topically 4 (four) times daily.     tadalafil 20 MG Tab  Commonly known as:  CIALIS  Take 1 tablet (20 mg total) by mouth every other day. Take every other day as needed        STOP taking these medications    cephALEXin 500 MG capsule  Commonly known as:  KEFLEX            Indwelling Lines/Drains at time of discharge:   Lines/Drains/Airways     None                 Time spent on the discharge of patient: 35 minutes  Patient was seen and examined on the date of discharge and determined to be suitable for discharge.         Palak King MD  Department of Hospital Medicine  [facility]

## 2019-12-31 NOTE — PROGRESS NOTES
[facility] Hospital Medicine  Progress Note    Patient Name: Alessandro Cerna  MRN: [id number]  Patient Class: IP- Inpatient   Admission Date: 12/26/2019  Length of Stay: 1 days  Attending Physician: Rolo Patel DO  Primary Care Provider: Jamaal Cabrera DO        Subjective:     Principal Problem:Cellulitis of left foot        HPI:  The patient is a 64 years old male pt with PMH significant for GERD on no medication, TB in 1982, S/P treatment, otherwise the pt is healthy.  The pt presented to ED with ongoing left ankle calcanuous edema and pain.  As per pt, he had plantar fascitis of the left foot, for which he was seen and evaluated by a (podiatrist) and had steroid injection at the medial part of the foot on 11/20, since then he had swelling and edema, pain in the left foot, he was seen by the MD who performed the injection and was told it is non infectiouis process.   The pt came to ED on 11/27 and was given 5 days worth of antibiotics Clindamycin (proably as pt recalls) and then was seen by his PCP who prescribed keflex for 5 days, he had some resolution but edema is back.  Pt also had MRI a month ago showing edema of the area of the fascia and possible the muscle.    The pt denies having medication at home, he is healthy otherwise.  Denies smokinh  He is a social drinker a drink or two a week.  Family hx non contributory, no DM that he knows of .    Pt to be placed in obs, start broad spectrum ABX and MRI of the left foot    Overview/Hospital Course:  12/27 pt doing ok, MRI no osteo, discussing with podiatry, and pt had pain in left tigh , no DVT on US  12/28 pt seen, he is clinically stable awaiting the rearfoot MRI to be completed, appreciate podiatry help in setting it up today. Cultures still NGTD. Will continue IV abx, awaiting MRI and recs by podiatry.  12/29 pt seen, he still feeling ok, no fever, the pt need to be evaluated by podiatry and ID for the possible fluid  collection/abscess/ start of osteo in th rear left foot  12/30 pt seen, he had I&D and left rear foot evacuation of the blood collection, cultures intra-op were collected. ID recommended continue vacn and zosyn for now. explaied to pt if we get a result of culture we might be able to zoom in on abx for home use and possible need for PICC line if ID recommend extended IV abx    Interval History: DPM did evacuate the bloody collection in the left rear ankle area    Review of Systems   Constitutional: Negative for activity change, chills and fatigue.   Gastrointestinal: Negative for constipation, diarrhea and vomiting.   Musculoskeletal: Positive for arthralgias (left foot ankle-controlled).   Neurological: Negative for dizziness and numbness.   Psychiatric/Behavioral: Negative for agitation. The patient is nervous/anxious.      Objective:     Vital Signs (Most Recent):  Temp: 97.1 °F (36.2 °C) (12/30/19 1345)  Pulse: 64 (12/30/19 1345)  Resp: 20 (12/30/19 1345)  BP: (!) 142/74 (12/30/19 1345)  SpO2: 100 % (12/30/19 1345) Vital Signs (24h Range):  Temp:  [96.2 °F (35.7 °C)-98.1 °F (36.7 °C)] 97.1 °F (36.2 °C)  Pulse:  [55-78] 64  Resp:  [14-20] 20  SpO2:  [94 %-100 %] 100 %  BP: (121-142)/(72-88) 142/74     Weight: 76.8 kg (169 lb 5 oz)  Body mass index is 26.52 kg/m².    Intake/Output Summary (Last 24 hours) at 12/30/2019 1816  Last data filed at 12/30/2019 1700  Gross per 24 hour   Intake 1125 ml   Output 850 ml   Net 275 ml      Physical Exam   Constitutional: He appears well-developed and well-nourished.   HENT:   Head: Normocephalic and atraumatic.   Eyes: EOM are normal.   Neck: Neck supple.   Cardiovascular: Normal rate.   Pulmonary/Chest: Effort normal. No respiratory distress.   Abdominal: There is no guarding.   Musculoskeletal: Normal range of motion. He exhibits deformity (post op dressing on the left foot ankle heel area ).   Skin: Skin is dry.   Psychiatric: He has a normal mood and affect. His behavior  is normal. Judgment and thought content normal.   Nursing note and vitals reviewed.      Significant Labs:   Recent Labs   Lab 12/28/19  0508 12/29/19  0546 12/30/19  0457   WBC 7.42 6.11 6.05   HGB 13.4* 13.6* 13.6*   HCT 40.2 40.4 41.1    277 289     Recent Labs   Lab 12/28/19  0508 12/29/19  0546 12/30/19  0455    140 140   K 5.4* 4.2 3.9    102 104   CO2 27 29 25   BUN 9 10 11   CREATININE 1.0 1.0 1.0    102 95   CALCIUM 9.2 9.2 9.1   MG 2.2 2.2 2.2     Recent Labs   Lab 12/26/19  0857   ALKPHOS 86   ALT 13   AST 15   ALBUMIN 4.1   PROT 8.6*   BILITOT 1.6*      No results for input(s): CPK, CPKMB, MB, TROPONINI in the last 72 hours.  No results for input(s): POCTGLUCOSE in the last 168 hours.  Hemoglobin A1C   Date Value Ref Range Status   04/05/2019 5.1 4.0 - 5.6 % Final     Comment:     ADA Screening Guidelines:  5.7-6.4%  Consistent with prediabetes  >or=6.5%  Consistent with diabetes  High levels of fetal hemoglobin interfere with the HbA1C  assay. Heterozygous hemoglobin variants (HbS, HgC, etc)do  not significantly interfere with this assay.   However, presence of multiple variants may affect accuracy.     11/16/2017 5.0 4.0 - 5.6 % Final     Comment:     According to ADA guidelines, hemoglobin A1c <7.0% represents  optimal control in non-pregnant diabetic patients. Different  metrics may apply to specific patient populations.   Standards of Medical Care in Diabetes-2016.  For the purpose of screening for the presence of diabetes:  <5.7%     Consistent with the absence of diabetes  5.7-6.4%  Consistent with increasing risk for diabetes   (prediabetes)  >or=6.5%  Consistent with diabetes  Currently, no consensus exists for use of hemoglobin A1c  for diagnosis of diabetes for children.  This Hemoglobin A1c assay has significant interference with fetal   hemoglobin   (HbF). The results are invalid for patients with abnormal amounts of   HbF,   including those with known Hereditary  Persistence   of Fetal Hemoglobin. Heterozygous hemoglobin variants (HbAS, HbAC,   HbAD, HbAE, HbA2) do not significantly interfere with this assay;   however, presence of multiple variants in a sample may impact the %   interference.       Scheduled Meds:   enoxaparin  40 mg Subcutaneous Daily    piperacillin-tazobactam (ZOSYN) IVPB  4.5 g Intravenous Q8H    vancomycin (VANCOCIN) IVPB  1,000 mg Intravenous Q12H     Continuous Infusions:  As Needed: acetaminophen, acetaminophen, influenza, melatonin, ondansetron, oxyCODONE, sodium chloride 0.9%, Pharmacy to dose Vancomycin consult **AND** vancomycin - pharmacy to dose    Significant Imaging:   No new imaging today      Assessment/Plan:      * Cellulitis of left foot  Possible from the plantar fascitis vs from the injection.  Pt had couple of rounds of oral abx, not clearing the infection/edema totally.    Will place on iv vanc and zosyn.  Will order MRI. Although he had one a month ago, but no resolution of the symptoms with abx.  Blood cultures ordered in ED, doubt goof results given the abx recent use  Might consider ID consult if needed or podiatry consult    12/27 no osteo on mri  Discuss with DPM  12/28 awaiting MRI rear foot.  Continue abx,  Neg cultures so far  12/29 MRI of rear foot concerning for early osteo,   Consulting ID, F/U with DPM  12/30 S/P intra-op culture collected.  Continue current vanc/zosyn  Appreciate DPM, ID inputs    Pain in limb  Secondary to faciitis vs inflammation vs infection.  Symptomatic tx.  tx underlying cause  12/27 no DVT on Us  12/28 improving slowly  12/29 controlled  12/30 post op pain controlled    Edema of left foot  See above   12/30 S/P evacuation of bloody collection by podiatry    GERD (gastroesophageal reflux disease)  Not on meds at home  monitor      Soft tissue infection  12/27 consulted podiatry for help in management  12/28 DPM on board.  MRI rear foot ordered  12/29 abnormal MRI rear foot, see above  12/30 F/U  cultures and ID inputs/recs.  Explained to pt possible poc and possible IV abx for home       VTE Risk Mitigation (From admission, onward)         Ordered     enoxaparin injection 40 mg  Daily      12/26/19 1432     IP VTE HIGH RISK PATIENT  Once      12/26/19 1627                      Rolo Patel DO  Department of Hospital Medicine   [facility]

## 2019-12-31 NOTE — PLAN OF CARE
Problem: Physical Therapy Goal  Goal: Physical Therapy Goal  Description  Goals to be met by: 12/31/2019     No PT goals established         Outcome: Met   Patient Mod I with axillary crutches on all surfaces PWB on ball / forefoot LLE.  No acute skilled PT goals established  Will DC PT service

## 2019-12-31 NOTE — ASSESSMENT & PLAN NOTE
Secondary to faciitis vs inflammation vs infection.  Symptomatic tx.  tx underlying cause  12/27 no DVT on Us  12/28 improving slowly  12/29 controlled  12/30 post op pain controlled

## 2019-12-31 NOTE — ASSESSMENT & PLAN NOTE
Possible from the plantar fascitis vs from the injection.  Pt had couple of rounds of oral abx, not clearing the infection/edema totally.    Will place on iv vanc and zosyn.  Will order MRI. Although he had one a month ago, but no resolution of the symptoms with abx.  Blood cultures ordered in ED, doubt goof results given the abx recent use  Might consider ID consult if needed or podiatry consult    12/27 no osteo on mri  Discuss with DPM  12/28 awaiting MRI rear foot.  Continue abx,  Neg cultures so far  12/29 MRI of rear foot concerning for early osteo,   Consulting ID, F/U with DPM  12/30 S/P intra-op culture collected.  Continue current vanc/zosyn  Appreciate DPM, ID inputs

## 2019-12-31 NOTE — PLAN OF CARE
Discussed with pt. Fall precautions and preventions.Call light and personal items are placed within pt.s reach.Pt. Instructed to call for assistance before attempting to get out of the bed.Pt. Verbalizes understanding.

## 2020-01-01 NOTE — PLAN OF CARE
Patient is doing well today. Edema and pain in LLE have significantly subsided. Patient is afebrile and without leukocytosis. Cultures are remaining negative. No suggestion of osteo from imaging or surgical report. Recommend patient complete a total of 2 week course of antibiotics from surgery date on 12/30/19. He can be transitioned to PO Augmentin to complete the remainder of the course.

## 2020-01-03 LAB
BACTERIA SPEC AEROBE CULT: ABNORMAL
BACTERIA SPEC ANAEROBE CULT: NORMAL
BACTERIA SPEC ANAEROBE CULT: NORMAL

## 2020-01-05 LAB — BACTERIA SPEC AEROBE CULT: ABNORMAL

## 2020-01-08 ENCOUNTER — OFFICE VISIT (OUTPATIENT)
Dept: PODIATRY | Facility: CLINIC | Age: 65
End: 2020-01-08
Payer: COMMERCIAL

## 2020-01-08 VITALS
HEIGHT: 67 IN | SYSTOLIC BLOOD PRESSURE: 139 MMHG | DIASTOLIC BLOOD PRESSURE: 79 MMHG | WEIGHT: 179 LBS | BODY MASS INDEX: 28.09 KG/M2 | HEART RATE: 79 BPM

## 2020-01-08 DIAGNOSIS — L08.9 LEFT FOOT INFECTION: ICD-10-CM

## 2020-01-08 DIAGNOSIS — Z09 FOLLOW-UP EXAMINATION FOLLOWING SURGERY: Primary | ICD-10-CM

## 2020-01-08 LAB
COMMENT: NORMAL
FINAL PATHOLOGIC DIAGNOSIS: NORMAL
GROSS: NORMAL

## 2020-01-08 PROCEDURE — 99213 PR OFFICE/OUTPT VISIT, EST, LEVL III, 20-29 MIN: ICD-10-PCS | Mod: S$GLB,,, | Performed by: STUDENT IN AN ORGANIZED HEALTH CARE EDUCATION/TRAINING PROGRAM

## 2020-01-08 PROCEDURE — 99999 PR PBB SHADOW E&M-EST. PATIENT-LVL III: ICD-10-PCS | Mod: PBBFAC,,, | Performed by: STUDENT IN AN ORGANIZED HEALTH CARE EDUCATION/TRAINING PROGRAM

## 2020-01-08 PROCEDURE — 99999 PR PBB SHADOW E&M-EST. PATIENT-LVL III: CPT | Mod: PBBFAC,,, | Performed by: STUDENT IN AN ORGANIZED HEALTH CARE EDUCATION/TRAINING PROGRAM

## 2020-01-08 PROCEDURE — 3008F BODY MASS INDEX DOCD: CPT | Mod: CPTII,S$GLB,, | Performed by: STUDENT IN AN ORGANIZED HEALTH CARE EDUCATION/TRAINING PROGRAM

## 2020-01-08 PROCEDURE — 3008F PR BODY MASS INDEX (BMI) DOCUMENTED: ICD-10-PCS | Mod: CPTII,S$GLB,, | Performed by: STUDENT IN AN ORGANIZED HEALTH CARE EDUCATION/TRAINING PROGRAM

## 2020-01-08 PROCEDURE — 99213 OFFICE O/P EST LOW 20 MIN: CPT | Mod: S$GLB,,, | Performed by: STUDENT IN AN ORGANIZED HEALTH CARE EDUCATION/TRAINING PROGRAM

## 2020-01-08 NOTE — PATIENT INSTRUCTIONS
-Can shower, don't soak the foot.   -Can transition to supportive tennis shoe  -Use ACE wrap for swelling.   -Can use triple antibiotic ointment like Neosporin or Polysporin (found at [facility]) with bandage if wound drains.   -Ok to walk on foot as tolerated

## 2020-01-08 NOTE — PROGRESS NOTES
Subjective:      Patient ID: Alessandro Cerna is a 64 y.o. male.    Chief Complaint: Follow-up (from hospital left foot side of ankle )    Pt is a 63 y/o male  has a past medical history of Chronic seasonal allergic rhinitis, GERD (gastroesophageal reflux disease), and TB (pulmonary tuberculosis).   Presents as a follow up for left heel infection. Pt is s/p I&D 12/20/19 of left lateral heel for abscess. States he has missed one or two doses of antibiotics. Other than that, states he is still taking his antibiotics as prescribed. States he is still using crutches because he has mild heel pain. No other pedal complaints. Denies signs of infection. Denies nausea, vomiting, fever, chills, SOB, CP.    Review of Systems   Constitution: Negative for chills, decreased appetite, diaphoresis and fever.   Cardiovascular: Positive for leg swelling. Negative for claudication.   Respiratory: Negative for shortness of breath.    Skin: Negative for color change, dry skin, flushing, itching, nail changes, poor wound healing, rash and suspicious lesions.   Musculoskeletal: Negative for joint pain, joint swelling and myalgias.   Gastrointestinal: Negative for nausea and vomiting.   Neurological: Negative for loss of balance, numbness and paresthesias.           Objective:      Physical Exam   Constitutional: He is oriented to person, place, and time. He appears well-developed and well-nourished. No distress.   Cardiovascular: Intact distal pulses.   Pulses:       Dorsalis pedis pulses are 2+ on the right side, and 2+ on the left side.        Posterior tibial pulses are 2+ on the right side, and 2+ on the left side.   Dorsalis pedis and posterior tibial pulses are within normal limits. Skin temperature is within normal limits. Toes are cool to touch and feet are warm proximally. Hair growth is within normal limits. Skin is normotrophic and without hyperpigmentation. No edema noted.   Musculoskeletal: He exhibits deformity. He  exhibits no edema or tenderness.        Right foot: There is decreased range of motion and deformity.        Left foot: There is decreased range of motion and deformity.   Adequate joint range of motion without pain, limitation, nor crepitation to bilateral feet and ankle joints. Muscle strength is 5/5 in all groups bilaterally.      Bunions noted, sammy. Reducible hammer toes noted 2-5, sammy   Feet:   Right Foot:   Skin Integrity: Negative for ulcer, blister, skin breakdown, erythema, warmth, callus or dry skin.   Left Foot:   Skin Integrity: Negative for ulcer, blister, skin breakdown, erythema, warmth, callus or dry skin.   Neurological: He is alert and oriented to person, place, and time. No sensory deficit.   Light touch within normal limits bilaterally.       Skin: Skin is warm and dry. Capillary refill takes less than 2 seconds. No bruising, no ecchymosis, no laceration, no lesion, no petechiae and no rash noted. He is not diaphoretic. No erythema. No pallor.   Skin is warm, dry, and supple, bilaterally, no acute SOI noted, bilaterally, appears stable.     Nails 1-5 are well trimmed and normotrophic, sammy.        Sutures intact and skin well coapted, no periwound erythema noted, no streaking or drainage. No further signs of abscess noted. Surgical incision seems well healed.      Psychiatric: He has a normal mood and affect. His behavior is normal. Judgment and thought content normal.   Nursing note and vitals reviewed.            Assessment:       Encounter Diagnoses   Name Primary?    Follow-up examination following surgery Yes    Left foot infection          Plan:       Alessandro was seen today for follow-up.    Diagnoses and all orders for this visit:    Follow-up examination following surgery    Left foot infection      I counseled the patient on his conditions, their implications and medical management.  Sutures removed today without incident, pt tolerated procedure well.   No wound noted, protective  bandage applied. Wound care instructions dispensed in AVS  Can transition to supportive tennis shoe  Continue antibiotics  Continue follow up with PCP for management of antibiotics  Pathology results pending, microbiology with pseudomonas fluorescens and staph aureus  Can follow up in 1 week.

## 2020-01-09 ENCOUNTER — OFFICE VISIT (OUTPATIENT)
Dept: FAMILY MEDICINE | Facility: CLINIC | Age: 65
End: 2020-01-09
Payer: COMMERCIAL

## 2020-01-09 VITALS
HEART RATE: 87 BPM | WEIGHT: 172.19 LBS | HEIGHT: 67 IN | SYSTOLIC BLOOD PRESSURE: 102 MMHG | OXYGEN SATURATION: 96 % | BODY MASS INDEX: 27.02 KG/M2 | DIASTOLIC BLOOD PRESSURE: 70 MMHG | TEMPERATURE: 99 F

## 2020-01-09 DIAGNOSIS — A49.01 STAPH AUREUS INFECTION: ICD-10-CM

## 2020-01-09 DIAGNOSIS — Z00.00 VISIT FOR WELL MAN HEALTH CHECK: Primary | ICD-10-CM

## 2020-01-09 DIAGNOSIS — Z91.09 ENVIRONMENTAL ALLERGIES: ICD-10-CM

## 2020-01-09 DIAGNOSIS — Z91.199 LACK OF FOLLOW-UP AFTER HOSPITALIZATION: ICD-10-CM

## 2020-01-09 DIAGNOSIS — Z12.5 SCREENING PSA (PROSTATE SPECIFIC ANTIGEN): ICD-10-CM

## 2020-01-09 DIAGNOSIS — R79.82 ELEVATED C-REACTIVE PROTEIN (CRP): ICD-10-CM

## 2020-01-09 DIAGNOSIS — B90.9: ICD-10-CM

## 2020-01-09 PROCEDURE — 99999 PR PBB SHADOW E&M-EST. PATIENT-LVL IV: ICD-10-PCS | Mod: PBBFAC,,, | Performed by: FAMILY MEDICINE

## 2020-01-09 PROCEDURE — 99999 PR PBB SHADOW E&M-EST. PATIENT-LVL IV: CPT | Mod: PBBFAC,,, | Performed by: FAMILY MEDICINE

## 2020-01-09 PROCEDURE — 99396 PREV VISIT EST AGE 40-64: CPT | Mod: S$GLB,,, | Performed by: FAMILY MEDICINE

## 2020-01-09 PROCEDURE — 99396 PR PREVENTIVE VISIT,EST,40-64: ICD-10-PCS | Mod: S$GLB,,, | Performed by: FAMILY MEDICINE

## 2020-01-09 RX ORDER — DOXYCYCLINE HYCLATE 100 MG
100 TABLET ORAL 2 TIMES DAILY
Qty: 20 TABLET | Refills: 0 | Status: SHIPPED | OUTPATIENT
Start: 2020-01-09 | End: 2020-01-19

## 2020-01-09 RX ORDER — CETIRIZINE HYDROCHLORIDE 10 MG/1
10 TABLET ORAL NIGHTLY
Qty: 90 TABLET | Refills: 3 | Status: SHIPPED | OUTPATIENT
Start: 2020-01-09 | End: 2021-06-10

## 2020-01-09 NOTE — Clinical Note
Hi, I have added on doxy based on aerobic cultures. He was sent home on augmentin. Culture showed STAPHYLOCOCCUS AUREUS resistant to PCN. Also showed PSEUDOMONAS FLUORESCENS GROUP Moderate Non-viable for susceptibility.Please let me know if you have any other recommendations. Jamaal

## 2020-01-09 NOTE — PATIENT INSTRUCTIONS
?Avoid tobacco  ?Be physically active  ?Maintain a healthy weight  ?Eat a diet rich in fruits, vegetables, and whole grains, and low in saturated/trans fat  ?Limit alcohol consumption  ?Protect against sexually transmitted infections  ?Avoid excess sun  RTC as directed during the visit, as needed or in 1 year for a physical with labs prior. Call one month prior to the physical to schedule apt and labs.     Labs in 6 weeks with ESR/CRP    Reviewed aerobic cultures; sent message to ID. Will add on doxy x 10 days. Will update you if they want a different plan.

## 2020-01-09 NOTE — PROGRESS NOTES
Subjective:       Patient ID: Alessandro Cerna is a 64 y.o. male.    Chief Complaint: Hospital Follow Up    Alessandro Cerna is a 64 y.o. male who presents today for a physical and hospital follow up.     Recently hospitalized.  Had injection for suspected plantar fasciitis.  This was believed to have gotten infected.  Had multiple rounds of antibiotics.  Returned to clinic to see me and it appeared that erythema had resolved.  Workup was done and procal was negative.  CRP was elevated.  Low clinical suspicion at that time for infection due to resolving symptoms but symptoms then worsened over the next 5 days.  He was admitted to the hospital for IV antibiotics.  Repeat MRI showed not only inflammation but infection and he underwent surgery.  Podiatry saw him while inpatient and had follow-up with him yesterday.     Diet/Exercise: he has lost weight, he thinks due to his recent illness. He has an upset stomach due to all of his antibiotics. He has been drinking water.     Labs: ordered for 6 weeks from now.     C-scope: 2015, repeat in 10 years    PMHx: reviewed in EMR and updated  Meds: reviewed in EMR and updated  Shx: reviewed in EMR and updated  FMHx: reviewed in EMR and updated  Social: patient has a cleaning company. He lives with his wife at home. Has a dog at home. No smokers at home. He has 3 kids; they lives nearby. 2 grandchildren (19 and 12).       Review of Systems   Constitutional: Negative for chills and fever.   Gastrointestinal: Negative for constipation, diarrhea, nausea and vomiting.   Genitourinary: Negative for difficulty urinating.   Skin: Positive for wound. Negative for rash.   Neurological: Negative for dizziness, light-headedness and headaches.         Health Maintenance Due   Topic Date Due    TETANUS VACCINE  10/25/2015     Immunization History   Administered Date(s) Administered    Pneumococcal Polysaccharide - 23 Valent 06/11/2014    Td (ADULT) 10/25/2005       Objective:      Vitals:    01/09/20 1042   BP: 102/70   Pulse: 87   Temp: 98.5 °F (36.9 °C)        Physical Exam   Constitutional: He is oriented to person, place, and time. He appears well-developed and well-nourished.   HENT:   Head: Normocephalic and atraumatic.   TM: appear normal BL  Nasal congestion noted   Cobblestoning without exudate noted  No adenopathy  Full ROM of neck     Eyes: Conjunctivae are normal.   Neck: Normal range of motion. Neck supple.   Cardiovascular: Normal rate and regular rhythm.   Pulmonary/Chest: Effort normal and breath sounds normal.   Abdominal: Soft. There is no tenderness.   Musculoskeletal: He exhibits deformity. He exhibits no edema.   Foot exam deferred (left). Evaluated by podiatry yesterday   Neurological: He is alert and oriented to person, place, and time.   Skin: Skin is warm.   Psychiatric: He has a normal mood and affect. His behavior is normal. Judgment and thought content normal.   Nursing note and vitals reviewed.              Assessment:       1. Visit for [facility] health check    2. Lack of follow-up after hospitalization    3. Screening PSA (prostate specific antigen)    4. Environmental allergies    5. Elevated C-reactive protein (CRP)    6. Staph aureus infection        Plan:       Labs in 6 weeks with ESR/CRP    Reviewed aerobic cultures; sent message to ID via epic. Will add on doxy x 10 days. Will update patient if they want a different plan.       Visit for [facility] health check  ?Avoid tobacco  ?Be physically active  ?Maintain a healthy weight  ?Eat a diet rich in fruits, vegetables, and whole grains, and low in saturated/trans fat  ?Limit alcohol consumption  ?Protect against sexually transmitted infections  ?Avoid excess sun  RTC as directed during the visit, as needed or in 1 year for a physical with labs prior. Call one month prior to the physical to schedule apt and labs.   -     CBC auto differential; Future; Expected date: 01/09/2020  -     Comprehensive  metabolic panel; Future; Expected date: 01/09/2020  -     Hemoglobin A1c; Future; Expected date: 01/09/2020  -     Lipid panel; Future; Expected date: 01/09/2020  -     TSH; Future; Expected date: 01/09/2020  -     Vitamin D; Future; Expected date: 01/09/2020  -     PSA, Screening; Future; Expected date: 01/09/2020    Lack of follow-up after hospitalization    Screening PSA (prostate specific antigen)  -     PSA, Screening; Future; Expected date: 01/09/2020    Environmental allergies  -     cetirizine (ZYRTEC) 10 MG tablet; Take 1 tablet (10 mg total) by mouth every evening.  Dispense: 90 tablet; Refill: 3    Elevated C-reactive protein (CRP)  -     Sedimentation rate; Future; Expected date: 01/09/2020  -     C-reactive protein; Future; Expected date: 01/09/2020    Staph aureus infection  -     doxycycline (VIBRA-TABS) 100 MG tablet; Take 1 tablet (100 mg total) by mouth 2 (two) times daily. for 10 days  Dispense: 20 tablet; Refill: 0      Warning signs discussed, patient to call with any further issues or worsening of symptoms.

## 2020-01-15 ENCOUNTER — OFFICE VISIT (OUTPATIENT)
Dept: PODIATRY | Facility: CLINIC | Age: 65
End: 2020-01-15
Payer: COMMERCIAL

## 2020-01-15 VITALS
HEART RATE: 85 BPM | HEIGHT: 67 IN | WEIGHT: 172 LBS | BODY MASS INDEX: 27 KG/M2 | DIASTOLIC BLOOD PRESSURE: 76 MMHG | SYSTOLIC BLOOD PRESSURE: 112 MMHG

## 2020-01-15 DIAGNOSIS — M24.573 EQUINUS CONTRACTURE OF ANKLE: ICD-10-CM

## 2020-01-15 DIAGNOSIS — M72.2 PLANTAR FASCIITIS: Primary | ICD-10-CM

## 2020-01-15 PROCEDURE — 99214 OFFICE O/P EST MOD 30 MIN: CPT | Mod: S$GLB,,, | Performed by: STUDENT IN AN ORGANIZED HEALTH CARE EDUCATION/TRAINING PROGRAM

## 2020-01-15 PROCEDURE — 99999 PR PBB SHADOW E&M-EST. PATIENT-LVL III: ICD-10-PCS | Mod: PBBFAC,,, | Performed by: STUDENT IN AN ORGANIZED HEALTH CARE EDUCATION/TRAINING PROGRAM

## 2020-01-15 PROCEDURE — 99214 PR OFFICE/OUTPT VISIT, EST, LEVL IV, 30-39 MIN: ICD-10-PCS | Mod: S$GLB,,, | Performed by: STUDENT IN AN ORGANIZED HEALTH CARE EDUCATION/TRAINING PROGRAM

## 2020-01-15 PROCEDURE — 3008F PR BODY MASS INDEX (BMI) DOCUMENTED: ICD-10-PCS | Mod: CPTII,S$GLB,, | Performed by: STUDENT IN AN ORGANIZED HEALTH CARE EDUCATION/TRAINING PROGRAM

## 2020-01-15 PROCEDURE — 99999 PR PBB SHADOW E&M-EST. PATIENT-LVL III: CPT | Mod: PBBFAC,,, | Performed by: STUDENT IN AN ORGANIZED HEALTH CARE EDUCATION/TRAINING PROGRAM

## 2020-01-15 PROCEDURE — 3008F BODY MASS INDEX DOCD: CPT | Mod: CPTII,S$GLB,, | Performed by: STUDENT IN AN ORGANIZED HEALTH CARE EDUCATION/TRAINING PROGRAM

## 2020-01-15 NOTE — PROGRESS NOTES
Subjective:      Patient ID: Alessandro Cerna is a 64 y.o. male.    Chief Complaint: Post-op Evaluation (2wk)    Pt is a 65 y/o male  has a past medical history of Chronic seasonal allergic rhinitis, GERD (gastroesophageal reflux disease), and TB (pulmonary tuberculosis).   Presents as a follow up for left heel infection. Pt is s/p I&D 12/20/19 of left lateral heel for abscess. States he has missed one or two doses of antibiotics. Other than that, states he is still taking his antibiotics as prescribed. States he is still using crutches because he has mild heel pain. No other pedal complaints. Denies signs of infection. Denies nausea, vomiting, fever, chills, SOB, CP.    1/15/20: Pt seen today, states he is almost done with his antibiotics. Relates to mild pain, states he is hesitant to walk on it. No other pedal complaints.     Review of Systems   Constitution: Negative for chills, decreased appetite, diaphoresis and fever.   Cardiovascular: Positive for leg swelling. Negative for claudication.   Respiratory: Negative for shortness of breath.    Skin: Negative for color change, dry skin, flushing, itching, nail changes, poor wound healing, rash and suspicious lesions.   Musculoskeletal: Negative for joint pain, joint swelling and myalgias.   Gastrointestinal: Negative for nausea and vomiting.   Neurological: Negative for loss of balance, numbness and paresthesias.           Objective:      Physical Exam   Constitutional: He is oriented to person, place, and time. He appears well-developed and well-nourished. No distress.   Cardiovascular: Intact distal pulses.   Pulses:       Dorsalis pedis pulses are 2+ on the right side, and 2+ on the left side.        Posterior tibial pulses are 2+ on the right side, and 2+ on the left side.   Dorsalis pedis and posterior tibial pulses are within normal limits. Skin temperature is within normal limits. Toes are cool to touch and feet are warm proximally. Hair growth is  within normal limits. Skin is normotrophic and without hyperpigmentation. No edema noted.   Musculoskeletal: He exhibits deformity. He exhibits no edema or tenderness.        Right foot: There is decreased range of motion and deformity.        Left foot: There is decreased range of motion and deformity.        Feet:    Adequate joint range of motion without pain, limitation, nor crepitation to bilateral feet and ankle joints. Muscle strength is 5/5 in all groups bilaterally.      Bunions noted, sammy. Reducible hammer toes noted 2-5, sammy   Feet:   Right Foot:   Skin Integrity: Negative for ulcer, blister, skin breakdown, erythema, warmth, callus or dry skin.   Left Foot:   Skin Integrity: Negative for ulcer, blister, skin breakdown, erythema, warmth, callus or dry skin.   Neurological: He is alert and oriented to person, place, and time. No sensory deficit.   Light touch within normal limits bilaterally.       Skin: Skin is warm and dry. Capillary refill takes less than 2 seconds. No bruising, no ecchymosis, no laceration, no lesion, no petechiae and no rash noted. He is not diaphoretic. No erythema. No pallor.   Skin is warm, dry, and supple, bilaterally, no acute SOI noted, bilaterally, appears stable.     Nails 1-5 are well trimmed and normotrophic, sammy.        Skin is healed, no open wound or signs of infection noted, skin appears stable, sammy     Psychiatric: He has a normal mood and affect. His behavior is normal. Judgment and thought content normal.   Nursing note and vitals reviewed.            Assessment:       Encounter Diagnoses   Name Primary?    Plantar fasciitis Yes    Equinus contracture of ankle          Plan:       Alessandro was seen today for post-op evaluation.    Diagnoses and all orders for this visit:    Plantar fasciitis  -     Ambulatory referral to Physical Therapy    Equinus contracture of ankle      I counseled the patient on his conditions, their implications and medical  management.  Pathology results reviewed with patient today, no signs of acute osteomyelitis noted.   Complete antibiotics as prescribed  Pt can transition to normal shoe, discontinue crutches  Pt to f/u with physical therapy for stretching/strengthening exercises and for plantar fasciitis   Recommend supportive tennis shoe with rigid arch support, small heel and wide toe box to accommodate foot type, consider Whipple Beast  RICE therapy, OTC NSAIDs PRN pain  I discussed with the  patient  signs and symptoms of infection including redness, drainage, purulence, odor, pain, elevated BS, streaking, fever, chills, etc . Patient is to seek medical attention (ER or urgent care) if these symptoms occur    Pt to f/u in one month for plantar fasciitis or sooner PRN

## 2020-01-15 NOTE — LETTER
January 15, 2020      Gideon Carlton MD  [address]  [address]  [place] [address]           [facility]  [address], [address]  [place] [address]  Phone: [phone]  Fax: [phone]          Patient: Alessandro Cerna   MR Number: [id number]   YOB: 1955   Date of Visit: 1/15/2020       Dear Dr. Gideon Carlton:    Thank you for referring Alessandro Cerna to me for evaluation. Attached you will find relevant portions of my assessment and plan of care.    If you have questions, please do not hesitate to call me. I look forward to following Alessandro Cerna along with you.    Sincerely,    Karo Mora, DWIGHT    Enclosure  CC:  No Recipients    If you would like to receive this communication electronically, please contact [email or web address] or [phone] to request more information on [employer] Link access.    For providers and/or their staff who would like to refer a patient to [facility], please contact us through our one-stop-shop provider referral line, [facility], at [phone].    If you feel you have received this communication in error or would no longer like to receive these types of communications, please e-mail [email or web address]

## 2020-01-29 ENCOUNTER — CLINICAL SUPPORT (OUTPATIENT)
Dept: REHABILITATION | Facility: HOSPITAL | Age: 65
End: 2020-01-29
Attending: STUDENT IN AN ORGANIZED HEALTH CARE EDUCATION/TRAINING PROGRAM
Payer: COMMERCIAL

## 2020-01-29 DIAGNOSIS — M79.672 FOOT PAIN, LEFT: ICD-10-CM

## 2020-01-29 DIAGNOSIS — R26.9 ABNORMAL GAIT: ICD-10-CM

## 2020-01-29 PROCEDURE — 97161 PT EVAL LOW COMPLEX 20 MIN: CPT | Mod: PO

## 2020-01-29 NOTE — PLAN OF CARE
[facility]  Physical Therapy Initial Evaluation    Name: Alessandro Cerna  Clinic Number: [id number]    Therapy Diagnosis:   Encounter Diagnoses   Name Primary?    Abnormal gait     Foot pain, left      Physician: Karo Mora DPM    Physician Orders: PT Eval and Treat  Medical Diagnosis from Referral: Plantar fasciitis (M72.2)  Evaluation Date: 1/29/2020  Authorization Period Expiration: 01/14/2021  Plan of Care Expiration: 03/27/2020  Visit # / Visits authorized: 1/ 1    Time In: 4:05 pm  Time Out: 5:05 pm  Total Billable Time: 60 minutes    Precautions: Standard    Subjective   Date of onset: November 2019  History of current condition - Alessandro reports: he went to see the doctor for pain in L foot during which he was dx with plantar fasciitis and received a shot which patient then states became infected.   Foot pain initially began earlier in 2019 but was exacerbated by the infection in November of 2019.  Pain was noted initially in heel but has now spread to entire foot. Denies any falls.  Patient reports he has been taking a lot of medication lately which he states has affected his bowel/bladder and caused about a 6lb weight loss. Other systemic screen is negative. Patient reports pain in morning when first bearing weight on L LE. Does not have a night splint. Patient reports he had a surgery      Medical History:   Past Medical History:   Diagnosis Date    Chronic seasonal allergic rhinitis 11/16/2017    GERD (gastroesophageal reflux disease)     TB (pulmonary tuberculosis) 1982    Treated [facility] 9 mo therapy.       Surgical History:   Alessandro Cerna  has a past surgical history that includes Irrigation and debridement of lower extremity (Left, 12/30/2019).    Medications:   Alessandro has a current medication list which includes the following prescription(s): cetirizine, diclofenac sodium, and tadalafil.    Allergies:   Review of patient's allergies indicates:  No  Known Allergies     Imaging, MRI studies, xray: December 2019    Prior Therapy: Yes, a long time ago but nothing for current complaints.   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   Prior Level of Function: Pt independent with all ADLs, job responsibilities and participating in regular physical activity  Current Level of Function: Pt independent with all ADLs, job responsibilities and participating in regular physical activity     Pain:  Current 7/10, worst 10/10, best 2/10   Location: left feet   Description: Sharp, tight, and pulling  Aggravating Factors: Walking  Easing Factors: pain medication    Pts goals: play soccer (used to perform a year ago but has stopped since pain began), improve stair navigation and walking distances with less pain    Objective     Observation:   R shoulder higher than L in standing; decreased lumbar curve. Bandage over L lateral ankle/foot surgical site stating it is for comfort as it rubs against his boot and hurts while at work.  In standing without footwear, he is observed to have increased pronation and as a result a decreased medial longutidinal arch on L when compared to R; bilateral bunions    Palpation:  TTP with moderate pressure over Plantar fascia origin on calcaneus and plantar aspect of 5th metatarsal; over lateral calf bilaterally    Flexibility:   Mild tightness in L gastroc compared to R with passive stretching    Gait:  Decreased heel strike on L;antalgic gait.  Gait deficits present more when ambulating without footwear.      Range of Motion: (measured in degrees)      Right AROM/PROM Left AROM/PROM Normal   Dorsiflexion with knee straight 10/10 10/10 0-20   Dorsiflexion with knee bent 20/25 20/25 0-20   Plantarflexion 45/45 40/40 0-50   Inversion 30/30 30/35 0-35   Eversion 15/15 15/20 0-15        MMT/Strength:     Right Left   Knee     Flexion 5/5  5/5   Extension 5/5 5/5   Ankle     Dorsiflexion 5/5 5/5 with Pain over distal metatarsals   Plantarflexion (single leg) 4/5 3/5 with Pain   Inversion 5/5 5/5 with pain over lateral foot   Eversion 5/5 5/5     Special Tests:  Knee to wall 3.5 cm bilaterally    Joint Mobility:   Pain with P/A 5th metatarsal glide on L  Pain with A/P talocrural glide on L    Edema:   Figure 8 - Right: 53.7 cm; Left: 55.6 cm    Balance:  SLS - R: 10 seconds; L: <10 seconds with pain  NBOS EC: 30 seconds   Tandem: 30 seconds bilaterally    CMS Impairment/Limitation/Restriction for FOTO Foot Survey    Therapist reviewed FOTO scores for Alessandro Cerna on 1/29/2020.   FOTO documents entered into [employer] - see Media section.    Limitation Score: 51%  Category: Mobility    Current : CK = at least 40% but < 60% impaired, limited or restricted  Goal: CJ = at least 20% but < 40% impaired, limited or restricted            TREATMENT     Home Exercises and Patient Education Provided    Education provided:   Patient was educated on initial evaluation findings and expectations as well as future PT services, procedures, and expectations for optimal compliance with therapy.     Written Home Exercises Provided: HEP will be provided at first treatment visit.     Assessment   Alessandro is a 64 y.o. male referred to outpatient Physical Therapy with a medical diagnosis of Plantar fasciitis. Pt presents with decreased ROM, strength, flexibility,  TTP with moderate palpation, and poor posture causing increased pain and decreased participation with work, recreational and houseold duties.       Pt prognosis is Good.   Pt will benefit from skilled outpatient Physical Therapy to address the deficits stated above and in the chart below, provide pt/family education, and to maximize pt's level of independence to return to PLOF.     Plan of care discussed with patient: Yes  Pt's spiritual, cultural and educational needs considered and patient is agreeable to the  plan of care and goals as stated below:     Anticipated Barriers for therapy: none    Medical Necessity is demonstrated by the following  History  Co-morbidities and personal factors that may impact the plan of care Co-morbidities:   none    Personal Factors:   no deficits     low   Examination  Body Structures and Functions, activity limitations and participation restrictions that may impact the plan of care Body Regions:   lower extremities    Body Systems:    gross coordinated movement  balance  gait  transfers  motor control    Participation Restrictions:   Unable to participate in soccer; difficulty ambulating short and long distances due to pain    Activity limitations:   Learning and applying knowledge  no deficits    General Tasks and Commands  no deficits    Communication  no deficits    Mobility  lifting and carrying objects  walking    Self care  looking after one's health    Domestic Life  shopping  cooking  doing house work (cleaning house, washing dishes, laundry)  assisting others    Interactions/Relationships  no deficits    Life Areas  no deficits    Community and Social Life  no deficits         low   Clinical Presentation stable and uncomplicated low   Decision Making/ Complexity Score: low     Goals:    In 3 weeks,   Goal Status   1. Patient will be independent with HEP to promote improved therapy outcomes.  STG    2.  Patient will improve L PF MMT to 3+/5 as evidenced by ability to perform SL heel raise with <5/10 pain to demonstrate increased functional mobility.   STG    3. Patient will walk for 30 minutes with <3/10 pain for improved functional mobility and performance of work/household duties.  STG        In 6 weeks,  Goal Status   4. Patient will be independent with progressed HEP to self manage symptoms.  LTG    5. Patient will improve SLS on L to 30 seconds without pain to improve ambulation and decrease fall risk.  LTG    6. Patient will perform SL heel raise with <2/10 pain to return to  PLOF.  LTG    7. Patient will run for 30 minutes with <3/10 pain to return to playing soccer.  LTG        Plan   Plan of care Certification: 1/29/2020 to 03/27/2020.    Outpatient Physical Therapy 2 times weekly for 12 visits to include the following interventions: Cardiovascular, Closed Chain Strengthening, Core Stabilization, Flexibility (83487): improve muscle ROM, flexibility and  function, Home Exercise and Stretching, Patient Education, Plyometrics, Postural Awareness and  Training, Postural Stabilization, ROM Exercises (81924) : Passive or active activities to increase joint ROM, Strengthening  (01537): improve muscle strength and function., Therapeutic Exercise (88897): improve muscle strength, ROM, flexibility and muscle function., Gait Training (43645) : Improve overall gait function including stair climbing, Cross Friction Massage, Manual Stretching (20140): passive or active stretching to improve muscle length and function., Strain/Counter-Strain, Manual Traction, Myofascial Release , Peripheral Joint Mobilization, Soft Tissue Mobs (69011): increase ROM, tissue length, joint mechanics and modulate pain., Spine Mobilization  (98256): increase ROM, tissue length, joint mechanics and modulate pain., Massage (59374):, Combo E-Stim/Ultrasound, Cryotherapy (75324: Application of cold to decrease local swelling and decrease pain., Heat - 45475:  Application of heat to increase local circulation and decrease pain., Hi-Volt E-Stim  (): Application of electrical stimulation to modulate pain., IFC E-Stim (): Application of electrical stimulation to modulate pain., Mechanical Traction (21851), Micro-Current, Premodulated E-Stim  (): Application of electrical stimulation to modulate pain., TENs E-Stim  (): Application of electrical stimulation to modulate pain., Ultrasound (32022): increase local circulation, improve tissue healing time and modulate pain., Whirlpool (37885): increase  local tissue circulation, improve elasticity of tissues, increased blood flow for improved muscle strength, ROM, flexiblity, and function., NMES E-stim ():  Application of electrical stimulation for motor learning and control., Iontophoresis (69069), NMR (76041): re-education of movement, balance, coordination, kinesthetic sense, posture and proprioception, Self Care & Home Management (92396) - Self-care/home management training (e.g., activities of daily living [ADL] and compensatory training, meal preparation, safety procedures, and instructions in use of assistive technology devices/adaptive equipment), direct one-on-one contact (15 minutes), Therapeutic Activity (51739):  Use of dynamic activities to improve functional performance. Dry needling.     Tracie Jara, PT

## 2020-01-31 ENCOUNTER — LAB VISIT (OUTPATIENT)
Dept: LAB | Facility: HOSPITAL | Age: 65
End: 2020-01-31
Attending: FAMILY MEDICINE
Payer: COMMERCIAL

## 2020-01-31 ENCOUNTER — OFFICE VISIT (OUTPATIENT)
Dept: FAMILY MEDICINE | Facility: CLINIC | Age: 65
End: 2020-01-31
Payer: COMMERCIAL

## 2020-01-31 VITALS
OXYGEN SATURATION: 97 % | DIASTOLIC BLOOD PRESSURE: 80 MMHG | HEART RATE: 70 BPM | TEMPERATURE: 98 F | WEIGHT: 177.25 LBS | SYSTOLIC BLOOD PRESSURE: 120 MMHG | BODY MASS INDEX: 27.82 KG/M2 | HEIGHT: 67 IN

## 2020-01-31 DIAGNOSIS — L53.9 ERYTHEMA: ICD-10-CM

## 2020-01-31 DIAGNOSIS — K92.1 HEMATOCHEZIA: ICD-10-CM

## 2020-01-31 DIAGNOSIS — L02.818 CUTANEOUS ABSCESS OF OTHER SITE: ICD-10-CM

## 2020-01-31 DIAGNOSIS — L02.612 ABSCESS OF HEEL, LEFT: Primary | ICD-10-CM

## 2020-01-31 DIAGNOSIS — L02.612 ABSCESS OF HEEL, LEFT: ICD-10-CM

## 2020-01-31 LAB
ANION GAP SERPL CALC-SCNC: 5 MMOL/L (ref 8–16)
BASOPHILS # BLD AUTO: 0.05 K/UL (ref 0–0.2)
BASOPHILS NFR BLD: 0.9 % (ref 0–1.9)
BUN SERPL-MCNC: 12 MG/DL (ref 8–23)
CALCIUM SERPL-MCNC: 8.9 MG/DL (ref 8.7–10.5)
CHLORIDE SERPL-SCNC: 103 MMOL/L (ref 95–110)
CO2 SERPL-SCNC: 29 MMOL/L (ref 23–29)
CREAT SERPL-MCNC: 1.2 MG/DL (ref 0.5–1.4)
DIFFERENTIAL METHOD: ABNORMAL
EOSINOPHIL # BLD AUTO: 0.1 K/UL (ref 0–0.5)
EOSINOPHIL NFR BLD: 2.1 % (ref 0–8)
ERYTHROCYTE [DISTWIDTH] IN BLOOD BY AUTOMATED COUNT: 13.7 % (ref 11.5–14.5)
EST. GFR  (AFRICAN AMERICAN): >60 ML/MIN/1.73 M^2
EST. GFR  (NON AFRICAN AMERICAN): >60 ML/MIN/1.73 M^2
FUNGUS SPEC CULT: NORMAL
FUNGUS SPEC CULT: NORMAL
GLUCOSE SERPL-MCNC: 104 MG/DL (ref 70–110)
HCT VFR BLD AUTO: 38.8 % (ref 40–54)
HGB BLD-MCNC: 12.5 G/DL (ref 14–18)
IMM GRANULOCYTES # BLD AUTO: 0.01 K/UL (ref 0–0.04)
IMM GRANULOCYTES NFR BLD AUTO: 0.2 % (ref 0–0.5)
LYMPHOCYTES # BLD AUTO: 2.6 K/UL (ref 1–4.8)
LYMPHOCYTES NFR BLD: 44.3 % (ref 18–48)
MCH RBC QN AUTO: 31 PG (ref 27–31)
MCHC RBC AUTO-ENTMCNC: 32.2 G/DL (ref 32–36)
MCV RBC AUTO: 96 FL (ref 82–98)
MONOCYTES # BLD AUTO: 0.5 K/UL (ref 0.3–1)
MONOCYTES NFR BLD: 9 % (ref 4–15)
NEUTROPHILS # BLD AUTO: 2.5 K/UL (ref 1.8–7.7)
NEUTROPHILS NFR BLD: 43.5 % (ref 38–73)
NRBC BLD-RTO: 0 /100 WBC
PLATELET # BLD AUTO: 223 K/UL (ref 150–350)
PMV BLD AUTO: 9.8 FL (ref 9.2–12.9)
POTASSIUM SERPL-SCNC: 4.4 MMOL/L (ref 3.5–5.1)
RBC # BLD AUTO: 4.03 M/UL (ref 4.6–6.2)
SODIUM SERPL-SCNC: 137 MMOL/L (ref 136–145)
WBC # BLD AUTO: 5.75 K/UL (ref 3.9–12.7)

## 2020-01-31 PROCEDURE — 3008F PR BODY MASS INDEX (BMI) DOCUMENTED: ICD-10-PCS | Mod: CPTII,S$GLB,, | Performed by: FAMILY MEDICINE

## 2020-01-31 PROCEDURE — 3008F BODY MASS INDEX DOCD: CPT | Mod: CPTII,S$GLB,, | Performed by: FAMILY MEDICINE

## 2020-01-31 PROCEDURE — 99214 OFFICE O/P EST MOD 30 MIN: CPT | Mod: S$GLB,,, | Performed by: FAMILY MEDICINE

## 2020-01-31 PROCEDURE — 99999 PR PBB SHADOW E&M-EST. PATIENT-LVL V: CPT | Mod: PBBFAC,,, | Performed by: FAMILY MEDICINE

## 2020-01-31 PROCEDURE — 99214 PR OFFICE/OUTPT VISIT, EST, LEVL IV, 30-39 MIN: ICD-10-PCS | Mod: S$GLB,,, | Performed by: FAMILY MEDICINE

## 2020-01-31 PROCEDURE — 36415 COLL VENOUS BLD VENIPUNCTURE: CPT | Mod: PO

## 2020-01-31 PROCEDURE — 80048 BASIC METABOLIC PNL TOTAL CA: CPT

## 2020-01-31 PROCEDURE — 99999 PR PBB SHADOW E&M-EST. PATIENT-LVL V: ICD-10-PCS | Mod: PBBFAC,,, | Performed by: FAMILY MEDICINE

## 2020-01-31 PROCEDURE — 85025 COMPLETE CBC W/AUTO DIFF WBC: CPT

## 2020-01-31 NOTE — PROGRESS NOTES
Subjective:       Patient ID: Alessandro Cerna is a 64 y.o. male.    Chief Complaint: Foot Swelling and Abdominal Pain    Alessandro is a 64 y.o. male who presents today with foot pain. History of recent left heel infection. Pt is s/p I&D 12/20/19 of left lateral heel for abscess. He recently finished his abx. The swelling and redness of the foot never went away. He is having pain of the right foot still. The pain feels different this his post surgical pain. The erythema is similar to how it was prior to his recent surgery.     He has been having increased BM. He has RLQ pain. He has possibly noticed blood in his stool as well. He noticed red when he wipes. He is not having diarrhea. He is not having black stool. He has a colonoscopy in 2015.     Review of Systems   Constitutional: Negative for chills and fever.   Gastrointestinal: Positive for abdominal pain and blood in stool. Negative for anal bleeding, diarrhea, nausea, rectal pain and vomiting.   Genitourinary: Negative for difficulty urinating and dysuria.   Musculoskeletal: Positive for gait problem and joint swelling.   Skin: Positive for color change and wound. Negative for rash.   Neurological: Negative for dizziness, light-headedness and numbness.             Objective:     Vitals:    01/31/20 1506   BP: 120/80   Pulse: 70   Temp: 98.4 °F (36.9 °C)        Physical Exam   Constitutional: He appears well-developed and well-nourished. No distress.   HENT:   Head: Normocephalic and atraumatic.   Cardiovascular: Normal rate and regular rhythm.   Pulmonary/Chest: Effort normal and breath sounds normal.   Abdominal: Soft. He exhibits no distension. There is no tenderness. There is no guarding.   Genitourinary: Rectum normal. Rectal exam shows no external hemorrhoid, no internal hemorrhoid, no fissure and anal tone normal.   Genitourinary Comments: Chaperone: Anne Marie   Musculoskeletal: He exhibits edema, tenderness and deformity.   See pictures below    Neurological: He is alert.   Skin: He is not diaphoretic.   Psychiatric: He has a normal mood and affect. His behavior is normal. Judgment and thought content normal.   Nursing note and vitals reviewed.                  Assessment:       1. Abscess of heel, left    2. Cutaneous abscess of other site    3. Erythema    4. Hematochezia        Plan:         Concern for incomplete treatment of infection  Repeat MRI  Will send note to podiatry  May need ID involvement as well.    Refer to GI; will need colonoscopy given recurrent episodes, age, and time since last normal colonoscopy (5 years)    Abscess of heel, left  -     MRI Foot ([place]) Left W W/O Contrast; Future; Expected date: 01/31/2020  -     MRI Foot ([place]) Left W W/O Contrast; Future; Expected date: 01/31/2020  -     Basic metabolic panel; Future; Expected date: 01/31/2020    Cutaneous abscess of other site  -     MRI Foot ([place]) Left W W/O Contrast; Future; Expected date: 01/31/2020  -     MRI Foot ([place]) Left W W/O Contrast; Future; Expected date: 01/31/2020  -     Basic metabolic panel; Future; Expected date: 01/31/2020    Erythema  -     MRI Foot ([place]) Left W W/O Contrast; Future; Expected date: 01/31/2020  -     MRI Foot ([place]) Left W W/O Contrast; Future; Expected date: 01/31/2020  -     Basic metabolic panel; Future; Expected date: 01/31/2020    Hematochezia  -     CBC auto differential; Future; Expected date: 01/31/2020  -     Cancel: Ambulatory referral to Gastroenterology  -     Ambulatory referral to Gastroenterology          Warning signs discussed, patient to call with any further issues or worsening of symptoms.

## 2020-02-01 ENCOUNTER — HOSPITAL ENCOUNTER (OUTPATIENT)
Dept: RADIOLOGY | Facility: HOSPITAL | Age: 65
Discharge: HOME | End: 2020-02-01
Attending: FAMILY MEDICINE
Payer: COMMERCIAL

## 2020-02-01 DIAGNOSIS — L02.612 ABSCESS OF HEEL, LEFT: ICD-10-CM

## 2020-02-01 DIAGNOSIS — L02.818 CUTANEOUS ABSCESS OF OTHER SITE: ICD-10-CM

## 2020-02-01 DIAGNOSIS — L53.9 ERYTHEMA: ICD-10-CM

## 2020-02-01 PROCEDURE — 25500020 PHARM REV CODE 255: Performed by: FAMILY MEDICINE

## 2020-02-01 PROCEDURE — 73720 MRI FOOT (HINDFOOT) LEFT W W/O CONTRAST: ICD-10-PCS | Mod: 26,LT,, | Performed by: RADIOLOGY

## 2020-02-01 PROCEDURE — 73720 MRI LWR EXTREMITY W/O&W/DYE: CPT | Mod: TC,LT

## 2020-02-01 PROCEDURE — 73720 MRI LWR EXTREMITY W/O&W/DYE: CPT | Mod: 26,76,LT, | Performed by: RADIOLOGY

## 2020-02-01 PROCEDURE — 73720 MRI FOOT (FOREFOOT) LEFT W W/O CONTRAST: ICD-10-PCS | Mod: 26,76,LT, | Performed by: RADIOLOGY

## 2020-02-01 PROCEDURE — 73720 MRI LWR EXTREMITY W/O&W/DYE: CPT | Mod: 26,LT,, | Performed by: RADIOLOGY

## 2020-02-01 PROCEDURE — A9585 GADOBUTROL INJECTION: HCPCS | Performed by: FAMILY MEDICINE

## 2020-02-01 RX ORDER — GADOBUTROL 604.72 MG/ML
8 INJECTION INTRAVENOUS
Status: COMPLETED | OUTPATIENT
Start: 2020-02-01 | End: 2020-02-01

## 2020-02-01 RX ADMIN — GADOBUTROL 8 ML: 604.72 INJECTION INTRAVENOUS at 03:02

## 2020-02-03 ENCOUNTER — TELEPHONE (OUTPATIENT)
Dept: FAMILY MEDICINE | Facility: CLINIC | Age: 65
End: 2020-02-03

## 2020-02-03 DIAGNOSIS — L02.612 ABSCESS OF HEEL, LEFT: ICD-10-CM

## 2020-02-03 DIAGNOSIS — L02.818 CUTANEOUS ABSCESS OF OTHER SITE: Primary | ICD-10-CM

## 2020-02-03 RX ORDER — LEVOFLOXACIN 750 MG/1
750 TABLET ORAL DAILY
Qty: 10 TABLET | Refills: 0 | Status: SHIPPED | OUTPATIENT
Start: 2020-02-03 | End: 2020-02-13

## 2020-02-03 NOTE — TELEPHONE ENCOUNTER
Please call patient. MRI shows decrease in abscess but that its still there. Less then 1/2 the size of previous. I have sent him in a different abx. I have also message his ID doctor and foot doctor. I am waiting for a message back. They will help determine next steps.

## 2020-02-03 NOTE — TELEPHONE ENCOUNTER
----- Message from Tiffanie Crawford sent at 2/3/2020  3:43 PM CST -----  Contact: pt  Pt called to get MRI results and can be reached at [phone]    Thanks,  Tiffanie Crawford

## 2020-02-03 NOTE — TELEPHONE ENCOUNTER
I spoke with patient and informed him of MRI results.  MRI shows decrease in abscess but that its still there. Less then 1/2 the size of previous. Patient advised that a message was sent to ID doctor and  Foot doctor. Awaiting a response.Patient  is requesting a call from  to discuss side effects of levaquin. Patient advised that a message would be sent to . Patient voiced understating.

## 2020-02-04 NOTE — TELEPHONE ENCOUNTER
"Was unable to discuss with ID doctor and podiatrist patient has seen  Podiatrist was out sick, discussed imaging and results with Dr. Moe. He agreed that there was an abscess present, will have patient be seen on Wednesday possible repeat procedure this week.    Discussed with ID on call, they reviewed the case with me in detail. Discussed that given nature of infection, oral abx may have limited benefit.     Discussed all of this with patient. He is declining levaquin due to "side effects he read." Discussed that he has already been on clinda, keflex, augmentin, and doxy. Discussed micro results showing pseudomonas. Advised him if he doesn't want to take medication, to go to the ED immediately for any worsening symptoms. He was already called by podiatrist office and is aware of scheduled apt on Wednesday.     Will fwd message to podiatry as an FYI.     May need ID f/u after likely upcoming procedure.   "

## 2020-02-05 ENCOUNTER — PATIENT MESSAGE (OUTPATIENT)
Dept: PODIATRY | Facility: CLINIC | Age: 65
End: 2020-02-05

## 2020-02-05 ENCOUNTER — TELEPHONE (OUTPATIENT)
Dept: FAMILY MEDICINE | Facility: CLINIC | Age: 65
End: 2020-02-05

## 2020-02-05 NOTE — TELEPHONE ENCOUNTER
I spoke with patient who state that his appointment with his foot doctor was canceled. Patient would like to know if he should start antibiotic , patient state that he doesn't have a follow up appointment until next week. Patient advised that a message would be sent to . Patient voiced understanding.

## 2020-02-05 NOTE — TELEPHONE ENCOUNTER
----- Message from Pita Tse sent at 2/5/2020  1:00 PM CST -----  Contact: Self [phone]  Patient is calling to talk to nurse in regards to questions on the antibiotic he is taken.

## 2020-02-07 ENCOUNTER — HOSPITAL ENCOUNTER (OUTPATIENT)
Dept: WOUND CARE | Facility: HOSPITAL | Age: 65
Discharge: HOME | End: 2020-02-07
Attending: STUDENT IN AN ORGANIZED HEALTH CARE EDUCATION/TRAINING PROGRAM
Payer: COMMERCIAL

## 2020-02-07 VITALS
SYSTOLIC BLOOD PRESSURE: 141 MMHG | BODY MASS INDEX: 27.78 KG/M2 | TEMPERATURE: 97 F | HEIGHT: 67 IN | HEART RATE: 67 BPM | WEIGHT: 177 LBS | DIASTOLIC BLOOD PRESSURE: 85 MMHG

## 2020-02-07 DIAGNOSIS — R60.0 EDEMA OF LEFT FOOT: Primary | ICD-10-CM

## 2020-02-07 PROCEDURE — 99214 OFFICE O/P EST MOD 30 MIN: CPT | Mod: 27 | Performed by: STUDENT IN AN ORGANIZED HEALTH CARE EDUCATION/TRAINING PROGRAM

## 2020-02-07 PROCEDURE — 99211 OFF/OP EST MAY X REQ PHY/QHP: CPT

## 2020-02-07 NOTE — PROGRESS NOTES
Subjective:       Patient ID: Alessandro Cerna is a 64 y.o. male.    Chief Complaint: Wound Consult    02/07/2020: 65 yo male presents to clinic today c/oswelling and pain to left foot. Patient referred to clinic by Dr. Cabrera. MRI to left foot on 02/01 showed fluid collection to left heel. No open wound noted at this time. Patient to have possible I&D of left heel per Dr. Mora next week. Dr. Mora discussed surgical procedure with patient, benefits & risks discussed, and consent signed. Patient to follow up in podiatry clinic s/p I&D. Pt states he has been feeling well overall, denies N/V/F/C. States has been taking antibiotics as prescribed.       Review of Systems   Constitutional: Negative for activity change, chills, diaphoresis and fever.   HENT: Negative for congestion and hearing loss.    Respiratory: Negative for cough and shortness of breath.    Cardiovascular: Negative for leg swelling.   Gastrointestinal: Negative for nausea and vomiting.   Musculoskeletal: Positive for gait problem.   Skin: Positive for wound. Negative for color change, pallor and rash.   Neurological: Positive for numbness. Negative for tremors, speech difficulty and weakness.   Psychiatric/Behavioral: Negative for agitation, confusion and suicidal ideas. The patient is not nervous/anxious.        Objective:      Physical Exam   Constitutional: He is oriented to person, place, and time. He appears well-developed and well-nourished. No distress.   Cardiovascular: Intact distal pulses.   Pulses:       Dorsalis pedis pulses are 2+ on the right side, and 2+ on the left side.        Posterior tibial pulses are 2+ on the right side, and 2+ on the left side.   DP and PT pulses palpable, sammy. Skin temp within normal limits, cool distally and warm proximally. Skin is normotrophic and hair growth is within normal limits. No edema or varicosities noted sammy.    Musculoskeletal: Normal range of motion. He exhibits edema and tenderness.  He exhibits no deformity.   POP to insertion of plantar fascia on medial tuberosity of calcaneus.     Equinus noted, reduced with knee flexion, sammy.    Feet:   Right Foot:   Protective Sensation: 10 sites tested. 10 sites sensed.   Skin Integrity: Negative for ulcer, blister, skin breakdown, erythema, warmth, callus or dry skin.   Left Foot:   Protective Sensation: 10 sites tested. 10 sites sensed.   Skin Integrity: Negative for ulcer, blister, skin breakdown, erythema, warmth, callus or dry skin.   Neurological: He is alert and oriented to person, place, and time. No sensory deficit.   No POP noted, sammy   Skin: Skin is warm and dry. Capillary refill takes less than 2 seconds. No abrasion, no bruising, no burn, no ecchymosis, no laceration, no lesion, no petechiae and no rash noted. He is not diaphoretic. No cyanosis or erythema. No pallor. Nails show no clubbing.   Skin is warm and dry with no open wounds noted, sammy. No acute SOI noted, skin appears to be stable.     Left foot with mild edema noted to lateral heel. No obvious fluctuance. No open wounds or acute signs of infection. Mild POP noted.    Psychiatric: He has a normal mood and affect. His behavior is normal. Judgment and thought content normal.       Assessment:       1. Edema of left foot           Wound 02/07/20 1201 Abscess Heel #1 (Active)   02/07/20 1201    Pre-existing:    Primary Wound Type: Abscess   Side: Left   Orientation:    Location: Heel   Wound/PI Number (optional): #1   Ankle-Brachial Index:    Pulses: palpable dp/pt pulses    Removal Indication and Assessment:    Wound Outcome:    (Retired) Wound Type:    (Retired) Wound Length (cm):    (Retired) Wound Width (cm):    (Retired) Depth (cm):    Wound Description (Comments):    Removal Indications:    Dressing Appearance Open to air 2/7/2020 12:00 PM   Drainage Amount None 2/7/2020 12:00 PM   Appearance Epithelialization;Dry 2/7/2020 12:00 PM   Tissue loss description Not applicable 2/7/2020  12:00 PM   Dressing Applied;Island/border 2/7/2020 12:00 PM           Plan:              Long discussion with patient regarding the procedure in detail. Patient understands all risks, potential complications, and alternatives, including, but not limited to those listed on the consent form. Risks include but are not limited to recurrence, infection, delayed wound healing, wound dehiscence, scar, poor cosmesis, neuropathic changes/nerve pain/pain, syndromes (RSD), numbness, infection, bleeding, hematoma, gangrenous changes, further surgery, loss of function, healing in a poor position, new or different ulceration or pre ulcerative callus, difficulty walking, gait changes, limb loss, blood clots of leg, lung, heart, brain, and death. All questions were answered. No guarantees given or implied as to outcome. Informed verbal and written consent was obtained. Consent forms read, signed, witnessed. Patient for surgery      Shoe inspection. General Foot Education. Patient instructed on proper foot hygeine. We discussed wearing proper shoe gear, daily foot inspections, never walking without protective shoe gear, never putting sharp instruments to feet. Patient reminded of the importance of good nutrition, weight loss if needed to help alleviate foot pressure/pain     Discussed general foot care:  Wear comfortable, proper fitting shoes. Wash feet daily. Dry well. After drying, apply moisturizer to feet (no lotion to webspaces). Inspect feet daily for skin breaks, blisters, swelling, or redness. Wear cotton socks (preferably white)  Change socks every day. Do NOT walk barefoot. Do NOT use heating pads or warm/hot water soaks. I discussed with the  patient  signs and symptoms of infection including redness, drainage, purulence, odor, pain, elevated BS, streaking, fever, chills, etc . Patient is to seek medical attention (ER or urgent care) if these symptoms occur          Follow up if symptoms worsen or fail to improve.

## 2020-02-10 ENCOUNTER — TELEPHONE (OUTPATIENT)
Dept: PODIATRY | Facility: CLINIC | Age: 65
End: 2020-02-10

## 2020-02-10 NOTE — TELEPHONE ENCOUNTER
----- Message from Cheyanne Bashir sent at 2/10/2020 12:46 PM CST -----  Contact: Patient   Patient called to reschedule surgery on 2/13/20 with Dr. Mora. Was not able to sent a message.    Please call [phone] to further discuss.

## 2020-02-11 ENCOUNTER — TELEPHONE (OUTPATIENT)
Dept: PODIATRY | Facility: CLINIC | Age: 65
End: 2020-02-11

## 2020-02-11 ENCOUNTER — TELEPHONE (OUTPATIENT)
Dept: FAMILY MEDICINE | Facility: CLINIC | Age: 65
End: 2020-02-11

## 2020-02-11 NOTE — TELEPHONE ENCOUNTER
Spoke with pt and advised that his surgery is on 2/212020 and he would like to cancel his appt tomorrow and also sent a message to pre admit department to douglas pt appt from 2/12/2020 to next week, per pt request. Also jaron pt post-op dressing change for 3/3/2020 at 2:40pm

## 2020-02-11 NOTE — TELEPHONE ENCOUNTER
----- Message from Anitra STEFANIParth Early sent at 2/11/2020  9:27 AM CST -----  Contact: [phone] self   Karo Mora's Pt  Pt states that he is returning your call. Pt is requesting a call back between 12:30 pm - 1 pm due to him being at work. Please advise

## 2020-02-11 NOTE — TELEPHONE ENCOUNTER
Spoke to pt over the phone. Pt is requesting changing date of his surgery because he has family coming in town. Plan for surgery next week, will schedule tomorrow AM. Pt to keep all appointments and continue antibiotics.

## 2020-02-11 NOTE — TELEPHONE ENCOUNTER
----- Message from Pita Tse sent at 2/11/2020 12:43 PM CST -----  Contact: Self [phone] please call after 3:30   Patient Returning Your Phone Call

## 2020-02-11 NOTE — TELEPHONE ENCOUNTER
Mr. Cerna is a 64 year old male who has possible recurrent soft tissue infection of the foot.    He was seen and evaluated by podiatry.     They are asking for preoperative clearance.     He is a Class I Risk with 3.9 % 30-day risk of death, MI, or cardiac arrest    In addition to his recent infection, his biggest current risk factor is his anemia; he has an evaluation with his external GI pending. This should not prohibit him from his necessary foot surgery but I do recommend post operative monitoring of his anemia.     He is to avoid all medications day of surgery.     Further abx management to be per podiatry and ID and based on operative cultures

## 2020-02-12 ENCOUNTER — PATIENT OUTREACH (OUTPATIENT)
Dept: ADMINISTRATIVE | Facility: OTHER | Age: 65
End: 2020-02-12

## 2020-02-13 ENCOUNTER — OFFICE VISIT (OUTPATIENT)
Dept: INFECTIOUS DISEASES | Facility: CLINIC | Age: 65
End: 2020-02-13
Payer: COMMERCIAL

## 2020-02-13 VITALS
SYSTOLIC BLOOD PRESSURE: 130 MMHG | HEIGHT: 67 IN | HEART RATE: 72 BPM | DIASTOLIC BLOOD PRESSURE: 83 MMHG | TEMPERATURE: 98 F | BODY MASS INDEX: 27.57 KG/M2 | WEIGHT: 175.69 LBS

## 2020-02-13 DIAGNOSIS — L02.612 ABSCESS OF HEEL, LEFT: ICD-10-CM

## 2020-02-13 DIAGNOSIS — L02.818 CUTANEOUS ABSCESS OF OTHER SITE: ICD-10-CM

## 2020-02-13 PROCEDURE — 99999 PR PBB SHADOW E&M-EST. PATIENT-LVL III: ICD-10-PCS | Mod: PBBFAC,,, | Performed by: INTERNAL MEDICINE

## 2020-02-13 PROCEDURE — 3008F BODY MASS INDEX DOCD: CPT | Mod: CPTII,S$GLB,, | Performed by: INTERNAL MEDICINE

## 2020-02-13 PROCEDURE — 99204 PR OFFICE/OUTPT VISIT, NEW, LEVL IV, 45-59 MIN: ICD-10-PCS | Mod: S$GLB,,, | Performed by: INTERNAL MEDICINE

## 2020-02-13 PROCEDURE — 99999 PR PBB SHADOW E&M-EST. PATIENT-LVL III: CPT | Mod: PBBFAC,,, | Performed by: INTERNAL MEDICINE

## 2020-02-13 PROCEDURE — 3008F PR BODY MASS INDEX (BMI) DOCUMENTED: ICD-10-PCS | Mod: CPTII,S$GLB,, | Performed by: INTERNAL MEDICINE

## 2020-02-13 PROCEDURE — 99204 OFFICE O/P NEW MOD 45 MIN: CPT | Mod: S$GLB,,, | Performed by: INTERNAL MEDICINE

## 2020-02-13 NOTE — PROGRESS NOTES
Subjective:      Patient ID: Alessandro Cerna is a 64 y.o. male.    Chief Complaint:No chief complaint on file.    History of Present Illness    63 y/o male with no significant past medical history.  He is referred to me for management of infection of his left foot.  Symptoms started in November of 2019. He went to see a podiatrist for plantar fasciitis.  He was given an injection to his foot on November 20.  About 2-3 days afterwards, he states that his foot became swollen.  He was seen in the ER on November 27.  He was discharged from the ER on keflex with follow up with a different podiatrist.  He was seen by podiatry around December 5 and the keflex was extended.  He continued to have pain and was admitted in December for evaluation.  MRI showed findings concerning for abscess.  He underwent I&D of his left heel on December 30.  Cultures were positive for MSSA and Pseudomonas sp.  He was discharged on augmentin prior to the cultures finalizing.  He symptoms did not substantially improve. He is now completing a course of levofloxacin.      Review of Systems   Constitution: Positive for malaise/fatigue. Negative for chills, decreased appetite, fever, night sweats, weight gain and weight loss.   HENT: Negative for congestion, ear pain, hearing loss, hoarse voice, sore throat and tinnitus.    Eyes: Negative for blurred vision, redness and visual disturbance.   Cardiovascular: Negative for chest pain, leg swelling and palpitations.   Respiratory: Negative for cough, hemoptysis, shortness of breath and sputum production.    Hematologic/Lymphatic: Negative for adenopathy. Does not bruise/bleed easily.   Skin: Negative for dry skin, itching, rash and suspicious lesions.   Musculoskeletal: Positive for back pain and joint pain. Negative for myalgias and neck pain.   Gastrointestinal: Positive for abdominal pain and diarrhea. Negative for constipation, heartburn, nausea and vomiting.   Genitourinary: Negative for  dysuria, flank pain, frequency, hematuria, hesitancy and urgency.   Neurological: Positive for headaches. Negative for dizziness, numbness, paresthesias and weakness.   Psychiatric/Behavioral: Negative for depression and memory loss. The patient does not have insomnia and is not nervous/anxious.      Objective:   Physical Exam   Constitutional: He is oriented to person, place, and time. He appears well-developed and well-nourished. No distress.   HENT:   Head: Normocephalic and atraumatic.   Right Ear: External ear normal.   Left Ear: External ear normal.   Nose: Nose normal.   Mouth/Throat: Oropharynx is clear and moist. No oropharyngeal exudate.   Eyes: Pupils are equal, round, and reactive to light. Conjunctivae and EOM are normal. Right eye exhibits no discharge. Left eye exhibits no discharge. No scleral icterus.   Neck: Normal range of motion. Neck supple. No JVD present. No tracheal deviation present. No thyromegaly present.   Cardiovascular: Normal rate, regular rhythm, normal heart sounds and intact distal pulses. Exam reveals no gallop and no friction rub.   No murmur heard.  Pulmonary/Chest: Effort normal and breath sounds normal. No stridor. No respiratory distress. He has no wheezes. He has no rales. He exhibits no tenderness.   Abdominal: Soft. Bowel sounds are normal. He exhibits no distension and no mass. There is no tenderness. There is no rebound and no guarding.   Musculoskeletal: Normal range of motion. He exhibits no edema or tenderness.   Slight induration to lateral aspect of left heel.   Lymphadenopathy:     He has no cervical adenopathy.   Neurological: He is alert and oriented to person, place, and time. He has normal reflexes. He displays normal reflexes. No cranial nerve deficit. He exhibits normal muscle tone. Coordination normal.   Skin: Skin is warm. No rash noted. He is not diaphoretic. No erythema. No pallor.   Psychiatric: He has a normal mood and affect. His behavior is normal.  Judgment and thought content normal.   Nursing note and vitals reviewed.    Assessment:       1. Cutaneous abscess of other site    2. Abscess of heel, left        65 y/o male with no significant past medical history.  He is referred to me for management of infection of his left foot.  Symptoms started in November of 2019. He went to see a podiatrist for plantar fasciitis.  He was given an injection to his foot on November 20.  About 2-3 days afterwards, he states that his foot became swollen.  He was seen in the ER on November 27.  He was discharged from the ER on keflex with follow up with a different podiatrist.  He was seen by podiatry around December 5 and the keflex was extended.  He continued to have pain and was admitted in December for evaluation.  MRI showed findings concerning for abscess.  He underwent I&D of his left heel on December 30.  Cultures were positive for MSSA and Pseudomonas sp.  He was discharged on augmentin prior to the cultures finalizing.  He symptoms did not substantially improve. He is now completing a course of levofloxacin.     His most recent MRI shows improvement in the abscess.  Overall he feels the swelling is better.  I believe he should be treated with IV antibiotics.  I will arrange for a midline or a picc line.  I will plan to start him on cefepime 2 grams IV q 8 hours for at least 2 weeks. Will check cbc, cmp, crp once a week.  Might extend it to 4 weeks if improvement is not complete.  I don't think he will need surgery but I will leave that up to his podiatrist.  Plan:       Cutaneous abscess of other site  -     IV cefepime    Abscess of heel, left  -     IV cefepime

## 2020-02-13 NOTE — LETTER
February 13, 2020      Jamala Cabrera, DO  [address]  [place] [address]           [facility]  [address]  [facility] [address]  Phone: [phone]  Fax: [phone]          Patient: Alessandro Cerna   MR Number: [id number]   YOB: 1955   Date of Visit: 2/13/2020       Dear Dr. Jamaal Cabrera:    Thank you for referring Alessandro Cerna to me for evaluation. Attached you will find relevant portions of my assessment and plan of care.    If you have questions, please do not hesitate to call me. I look forward to following Alessandro Cerna along with you.    Sincerely,    Quinton Skinner MD    Enclosure  CC:  No Recipients    If you would like to receive this communication electronically, please contact [email or web address] or [phone] to request more information on [facility] access.    For providers and/or their staff who would like to refer a patient to [facility], please contact us through our one-stop-shop provider referral line, [facility], [place], at [phone].    If you feel you have received this communication in error or would no longer like to receive these types of communications, please e-mail [email or web address]

## 2020-02-18 ENCOUNTER — TELEPHONE (OUTPATIENT)
Dept: PODIATRY | Facility: CLINIC | Age: 65
End: 2020-02-18

## 2020-02-19 ENCOUNTER — HOSPITAL ENCOUNTER (OUTPATIENT)
Dept: RADIOLOGY | Facility: HOSPITAL | Age: 65
Discharge: HOME | End: 2020-02-19
Attending: STUDENT IN AN ORGANIZED HEALTH CARE EDUCATION/TRAINING PROGRAM
Payer: COMMERCIAL

## 2020-02-19 ENCOUNTER — OFFICE VISIT (OUTPATIENT)
Dept: PODIATRY | Facility: CLINIC | Age: 65
End: 2020-02-19
Payer: COMMERCIAL

## 2020-02-19 VITALS
WEIGHT: 175 LBS | BODY MASS INDEX: 27.47 KG/M2 | DIASTOLIC BLOOD PRESSURE: 76 MMHG | HEIGHT: 67 IN | HEART RATE: 70 BPM | SYSTOLIC BLOOD PRESSURE: 126 MMHG

## 2020-02-19 DIAGNOSIS — L02.91 ABSCESS: ICD-10-CM

## 2020-02-19 DIAGNOSIS — M72.2 PLANTAR FASCIITIS: ICD-10-CM

## 2020-02-19 DIAGNOSIS — L53.9 ERYTHEMA: ICD-10-CM

## 2020-02-19 DIAGNOSIS — M79.672 LEFT FOOT PAIN: Primary | ICD-10-CM

## 2020-02-19 DIAGNOSIS — M79.672 LEFT FOOT PAIN: ICD-10-CM

## 2020-02-19 PROCEDURE — 99999 PR PBB SHADOW E&M-EST. PATIENT-LVL III: ICD-10-PCS | Mod: PBBFAC,,, | Performed by: STUDENT IN AN ORGANIZED HEALTH CARE EDUCATION/TRAINING PROGRAM

## 2020-02-19 PROCEDURE — 99999 PR PBB SHADOW E&M-EST. PATIENT-LVL III: CPT | Mod: PBBFAC,,, | Performed by: STUDENT IN AN ORGANIZED HEALTH CARE EDUCATION/TRAINING PROGRAM

## 2020-02-19 PROCEDURE — 99214 OFFICE O/P EST MOD 30 MIN: CPT | Mod: S$GLB,,, | Performed by: STUDENT IN AN ORGANIZED HEALTH CARE EDUCATION/TRAINING PROGRAM

## 2020-02-19 PROCEDURE — 99214 PR OFFICE/OUTPT VISIT, EST, LEVL IV, 30-39 MIN: ICD-10-PCS | Mod: S$GLB,,, | Performed by: STUDENT IN AN ORGANIZED HEALTH CARE EDUCATION/TRAINING PROGRAM

## 2020-02-19 PROCEDURE — 3008F PR BODY MASS INDEX (BMI) DOCUMENTED: ICD-10-PCS | Mod: CPTII,S$GLB,, | Performed by: STUDENT IN AN ORGANIZED HEALTH CARE EDUCATION/TRAINING PROGRAM

## 2020-02-19 PROCEDURE — 3008F BODY MASS INDEX DOCD: CPT | Mod: CPTII,S$GLB,, | Performed by: STUDENT IN AN ORGANIZED HEALTH CARE EDUCATION/TRAINING PROGRAM

## 2020-02-20 ENCOUNTER — TELEPHONE (OUTPATIENT)
Dept: PODIATRY | Facility: HOSPITAL | Age: 65
End: 2020-02-20

## 2020-02-20 ENCOUNTER — HOSPITAL ENCOUNTER (OUTPATIENT)
Dept: PREADMISSION TESTING | Facility: HOSPITAL | Age: 65
Discharge: HOME | End: 2020-02-20
Attending: STUDENT IN AN ORGANIZED HEALTH CARE EDUCATION/TRAINING PROGRAM

## 2020-02-20 ENCOUNTER — TELEPHONE (OUTPATIENT)
Dept: PODIATRY | Facility: CLINIC | Age: 65
End: 2020-02-20

## 2020-02-20 NOTE — PROGRESS NOTES
Subjective:      Patient ID: Alessandro Cerna is a 64 y.o. male.    Chief Complaint: Follow-up (left foot )    Pt is a 63 y/o male  has a past medical history of Chronic seasonal allergic rhinitis, GERD (gastroesophageal reflux disease), and TB (pulmonary tuberculosis).   Presents as a follow up for left heel infection. Pt is s/p I&D 12/20/19 of left lateral heel for abscess. States he has missed one or two doses of antibiotics. Other than that, states he is still taking his antibiotics as prescribed. States he is still using crutches because he has mild heel pain. No other pedal complaints. Denies signs of infection. Denies nausea, vomiting, fever, chills, SOB, CP.    1/15/20: Pt seen today, states he is almost done with his antibiotics. Relates to mild pain, states he is hesitant to walk on it. No other pedal complaints.    2/19/20: Pt seen today, states he is on ABX per ID, relates to mild increased swelling to lateral left heel. Denies pain. No other pedal complaints.      Review of Systems   Constitution: Negative for chills, decreased appetite, diaphoresis and fever.   Cardiovascular: Positive for leg swelling. Negative for claudication.   Respiratory: Negative for shortness of breath.    Skin: Negative for color change, dry skin, flushing, itching, nail changes, poor wound healing, rash and suspicious lesions.   Musculoskeletal: Negative for joint pain, joint swelling and myalgias.   Gastrointestinal: Negative for nausea and vomiting.   Neurological: Negative for loss of balance, numbness and paresthesias.           Objective:      Physical Exam   Constitutional: He is oriented to person, place, and time. He appears well-developed and well-nourished. No distress.   Cardiovascular: Intact distal pulses.   Pulses:       Dorsalis pedis pulses are 2+ on the right side, and 2+ on the left side.        Posterior tibial pulses are 2+ on the right side, and 2+ on the left side.   Dorsalis pedis and posterior  tibial pulses are within normal limits. Skin temperature is within normal limits. Toes are cool to touch and feet are warm proximally. Hair growth is within normal limits. Skin is normotrophic and without hyperpigmentation. No edema noted.   Musculoskeletal: He exhibits deformity. He exhibits no edema or tenderness.        Right foot: There is decreased range of motion and deformity.        Left foot: There is decreased range of motion and deformity.        Feet:    Adequate joint range of motion without pain, limitation, nor crepitation to bilateral feet and ankle joints. Muscle strength is 5/5 in all groups bilaterally.      Bunions noted, sammy. Reducible hammer toes noted 2-5, sammy   Feet:   Right Foot:   Skin Integrity: Negative for ulcer, blister, skin breakdown, erythema, warmth, callus or dry skin.   Left Foot:   Skin Integrity: Negative for ulcer, blister, skin breakdown, erythema, warmth, callus or dry skin.   Neurological: He is alert and oriented to person, place, and time. No sensory deficit.   Light touch within normal limits bilaterally.       Skin: Skin is warm and dry. Capillary refill takes less than 2 seconds. No bruising, no ecchymosis, no laceration, no lesion, no petechiae and no rash noted. He is not diaphoretic. No erythema. No pallor.   Skin is warm, dry, and supple, bilaterally, no acute SOI noted, bilaterally, appears stable.     Nails 1-5 are well trimmed and normotrophic, sammy.        Mild edema noted to left lateral heel, does not appear to be acutely infected at this time    Psychiatric: He has a normal mood and affect. His behavior is normal. Judgment and thought content normal.   Nursing note and vitals reviewed.            Assessment:       Encounter Diagnoses   Name Primary?    Left foot pain Yes    Abscess     Plantar fasciitis     Erythema          Plan:       Alessandro was seen today for follow-up.    Diagnoses and all orders for this visit:    Left foot pain  -     X-Ray Ankle  Complete Left; Future    Abscess    Plantar fasciitis    Erythema  -     MRI Ankle With Contrast Left; Future      I counseled the patient on his conditions, their implications and medical management.    MRI reviewed with pt    Discussed with pt risks vs benefits of I&D, MRI with fluid collection and pt with increased swelling, recommend I&D at this time with new deep cultures, pt in agreement.    Pt relates to mild ankle pain, ankle xray ordered    Long discussion with patient regarding the procedure in detail. Patient understands all risks, potential complications, and alternatives, including, but not limited to those listed on the consent form. Risks include but are not limited to recurrence, infection, delayed wound healing, wound dehiscence, scar, poor cosmesis, neuropathic changes/nerve pain/pain, syndromes (RSD), numbness, infection, bleeding, hematoma, gangrenous changes, further surgery, loss of function, healing in a poor position, new or different ulceration or pre ulcerative callus, difficulty walking, gait changes, limb loss, blood clots of leg, lung, heart, brain, and death. All questions were answered. No guarantees given or implied as to outcome. Informed verbal and written consent was obtained. Consent forms read, signed, witnessed. Patient for surgery.     Shoe inspection. General Foot Education. Patient instructed on proper foot hygeine. We discussed wearing proper shoe gear, daily foot inspections, never walking without protective shoe gear, never putting sharp instruments to feet. Patient reminded of the importance of good nutrition, weight loss if needed to help alleviate foot pressure/pain      Discussed general foot care:  Wear comfortable, proper fitting shoes. Wash feet daily. Dry well. After drying, apply moisturizer to feet (no lotion to webspaces). Inspect feet daily for skin breaks, blisters, swelling, or redness. Wear cotton socks (preferably white)  Change socks every day. Do NOT walk  barefoot. Do NOT use heating pads or warm/hot water soaks. I discussed with the  patient  signs and symptoms of infection including redness, drainage, purulence, odor, pain, elevated BS, streaking, fever, chills, etc . Patient is to seek medical attention (ER or urgent care) if these symptoms occur

## 2020-02-20 NOTE — TELEPHONE ENCOUNTER
Call pt to try and jaron MRI for today, he stated he does not have time and will call me back later today. I advised you would like this done before surgery pt hung the phone up

## 2020-02-20 NOTE — DISCHARGE INSTRUCTIONS
Your surgery is scheduled for ***.    Please report to Procedure Check In Room on the 2nd FLOOR at *** a.m.          INSTRUCTIONS IMPORTANT!!!  ¨ Do not eat or drink after 12 midnight-including water. OK to brush teeth, no   gum, candy or mints!    ¨ Take only these medicines with a small swallow of water-morning of surgery.        ____  Proceed to [facility] on *** for additional testing.        ____  Do not wear makeup, including mascara.  ____  No powder, lotions or creams to surgical area.  ____  Please remove all jewelry, including piercings and leave at home.  ____  No money or valuables needed. Please leave at home.  ____  Please bring any documents given by your doctor.  ____  If going home the same day, arrange for a ride home. You will not be able to             drive if Anesthesia was used.  ____  Children under 18 years require a parent / guardian present the entire time             they are in surgery / recovery.  ____  Wear loose fitting clothing. Allow for dressings, bandages.  ____  Stop Aspirin, Ibuprofen, Motrin and Aleve at least 3-5 days before surgery, unless otherwise instructed by your doctor, or the nurse.   You MAY use Tylenol/acetaminophen until day of surgery.  ____  Wash the surgical area with Hibiclens the night before surgery, and again the             morning of surgery.  Be sure to rinse hibiclens off completely (if instructed by   nurse).  ____  If you take diabetic medication, do not take am of surgery unless instructed by Doctor.  ____  Call MD for temperature above 101 degrees or any other signs of infection such as Urinary (bladder) infection, Upper respiratory infection, skin boils, etc.  ____ Stop taking any Fish Oil supplement or any Vitamins that contain Vitamin E at least 5 days prior to surgery.  ____ Do Not wear your contact lenses the day of your procedure.  You may wear your glasses.      ____Do not shave surgical site for 3 days prior to surgery.  ____  Practice Good hand washing before, during, and after procedure.      I have read or had read and explained to me, and understand the above information.  Additional comments or instructions:  For additional questions call [phone]      ANESTHESIA SIDE EFFECTS  -For the first 24 hours after surgery:  Do not drive, use heavy equipment, make important decisions, or drink alcohol  -It is normal to feel sleepy for several hours.  Rest until you are more awake.  -Have someone stay with you, if needed.  They can watch for problems and help keep you safe.  -Some possible post anesthesia side effects include: nausea and vomiting, sore throat and hoarseness, sleepiness, and dizziness.        Pre-Op Bathing Instructions    Before surgery, you can play an important role in your own health.    Because skin is not sterile, we need to be sure that your skin is as free of germs as possible. By following the instructions below, you can reduce the number of germs on your skin before surgery.    IMPORTANT: You will need to shower with a special soap called Hibiclens*, available at any pharmacy.  If you are allergic to Chlorhexidine (the antiseptic in Hibiclens), use an antibacterial soap such as Dial Soap for your preoperative shower.  You will shower with Hibiclens both the night before your surgery and the morning of your surgery.  Do not use Hibiclens on the head, face or genitals to avoid injury to those areas.    STEP #1: THE NIGHT BEFORE YOUR SURGERY     1. Do not shave the area of your body where your surgery will be performed.  2. Shower and wash your hair and body as usual with your normal soap and shampoo.  3. Rinse your hair and body thoroughly after you shower to remove all soap residue.  4. With your hand, apply one packet of Hibiclens soap to the surgical site.   5. Wash the site gently for five (5) minutes. Do not scrub your skin too hard.   6. Do not wash with your regular soap after Hibiclens is used.  7. Rinse your  body thoroughly.  8. Pat yourself dry with a clean, soft towel.  9. Do not use lotion, cream, or powder.  10. Wear clean clothes.    STEP #2: THE MORNING OF YOUR SURGERY     1. Repeat Step #1.    * Not to be used by people allergic to Chlorhexidine.        Abscess, Incision and Drainage  An abscess is an area of skin where bacteria have caused fluid (pus) to form. Bacteria normally live on the skin and dont cause harm. But sometimes bacteria enter the skin through a hair root, or cut or scrape in the skin. If bacteria become trapped under the skin, an abscess can form. An abscess can be caused by an ingrown hair, puncture wound, or insect bite. It can also be caused by a blocked oil gland, pimple, or cyst. Abscesses often occur on skin that is hairy or exposed to friction and sweat. An abscess near a hair root is called a boil.  At first, an abscess is red, raised, firm, and sore to the touch. The area can also feel warm. Then the area will then collect pus.  In some cases, an abscess will be cut and the pus drained out. This is known as incision and drainage, or I and D. It is also sometimes called lancing. A baby may need to stay in the hospital overnight for this procedure. After the procedure, your child may be given antibiotics to help cure the infection. The abscess will likely drain for several days before it dries up. It can take several weeks to heal.  Home care  Your healthcare provider may prescribe an oral or topical antibiotic. Pain medicine may also be prescribed. Follow all instructions when using these medicines with your child.  General care  Follow-up care  Follow up with your healthcare provider, or as advised.  Special note to parents  Take care to prevent the infection from spreading. Wash your hands with soap and warm water before and after caring for the abscess. Make sure you or other family members don't touch the abscess. Contact your healthcare provider if other family members have  symptoms.  When to seek medical advice  Call your healthcare provider right away if any of these occur:  · Fever of 100.4°F (38°C) or higher, or as directed by your child's healthcare provider  · The abscess gets bigger  · The abscess comes back  · Redness and swelling get worse  · Pain doesnt go away, or gets worse. In babies, pain may show up as fussing that cant be soothed.  · Foul-smelling fluid leaking from the area  · Red streaks in the skin around the area  · Reaction to the medicine  Date Last Reviewed: 12/1/2016  © 3386-6637 [employer]. [address] [place] [address], [place] [address]. All rights reserved. This information is not intended as a substitute for professional medical care. Always follow your healthcare professional's instructions.        Types of Anesthesia  Your anesthesiologist is a key member of your surgical team. He or she gives you anesthetics (medications to keep you comfortable and decrease your awareness of surgery) and monitors your condition to keep you safe during surgery. You will have 1 of 3 kinds of anesthesia during your surgery.  Monitored anesthesia care (MAC)  · Often used for surgery that is short or not too invasive.  · Sedatives (medicines to relax you) are given through an IV (intravenous) line.  · The area around the surgical site is usually numbed with a local anesthetic.  · You may choose to remain awake or sleep lightly.  Regional anesthesia (sometimes called spinal epidural or laura block)  · Often used for surgery on the arms, legs, and abdomen. It is also used during childbirth.  · A specific region of your body is numbed by injecting anesthetic near nerves, near your spine, or near the operative site.  · You may also be given sedatives through an IV line to relax you.  · With regional anesthesia, you may choose to remain awake or sleep lightly.  General anesthesia  · Often used for extensive surgery, such as on the heart, brain, or abdominal  operation.  · Also used when the patient wants to be totally asleep.  · May be given as a gas that you breathe and as medicines that are injected through an IV line.  · Because you are asleep, you feel no pain and remember nothing of the surgery.  The risks and complications of anesthesia depend on your overall health. If you are healthy, the risks are low. The risks are higher for patients with heart or lung problems. Your anesthesiologist or nurse anesthetist will discuss the risks with you.   Date Last Reviewed: 12/1/2016  © 6268-1593 [employer]. [address] [place] [address]. All rights reserved. This information is not intended as a substitute for professional medical care. Always follow your healthcare professional's instructions.

## 2020-02-21 NOTE — TELEPHONE ENCOUNTER
Spoke to pt on the phone, states he is in [place] because his brother is sick and he needs to cancel surgery tomorrow.     Advised pt that he should complete ankle imaging studies before planned procedure since his ankle pain has been worsening recently over the past several days and he has noticed increased swelling, this will assist in surgical planning.     Continue antibiotic as prescribed.

## 2020-02-24 ENCOUNTER — LAB VISIT (OUTPATIENT)
Dept: LAB | Facility: HOSPITAL | Age: 65
End: 2020-02-24
Attending: INTERNAL MEDICINE
Payer: COMMERCIAL

## 2020-02-24 DIAGNOSIS — M86.172 ACUTE OSTEOMYELITIS OF LEFT ANKLE OR FOOT: Primary | ICD-10-CM

## 2020-02-24 LAB
ALBUMIN SERPL BCP-MCNC: 4.1 G/DL (ref 3.5–5.2)
ALP SERPL-CCNC: 94 U/L (ref 55–135)
ALT SERPL W/O P-5'-P-CCNC: 25 U/L (ref 10–44)
ANION GAP SERPL CALC-SCNC: 6 MMOL/L (ref 8–16)
AST SERPL-CCNC: 25 U/L (ref 10–40)
BASOPHILS # BLD AUTO: 0.03 K/UL (ref 0–0.2)
BASOPHILS NFR BLD: 0.6 % (ref 0–1.9)
BILIRUB SERPL-MCNC: 1 MG/DL (ref 0.1–1)
BUN SERPL-MCNC: 13 MG/DL (ref 8–23)
CALCIUM SERPL-MCNC: 8.9 MG/DL (ref 8.7–10.5)
CHLORIDE SERPL-SCNC: 105 MMOL/L (ref 95–110)
CO2 SERPL-SCNC: 27 MMOL/L (ref 23–29)
CREAT SERPL-MCNC: 0.9 MG/DL (ref 0.5–1.4)
CRP SERPL-MCNC: 1.4 MG/L (ref 0–8.2)
DIFFERENTIAL METHOD: ABNORMAL
EOSINOPHIL # BLD AUTO: 0 K/UL (ref 0–0.5)
EOSINOPHIL NFR BLD: 0.8 % (ref 0–8)
ERYTHROCYTE [DISTWIDTH] IN BLOOD BY AUTOMATED COUNT: 14.1 % (ref 11.5–14.5)
EST. GFR  (AFRICAN AMERICAN): >60 ML/MIN/1.73 M^2
EST. GFR  (NON AFRICAN AMERICAN): >60 ML/MIN/1.73 M^2
GLUCOSE SERPL-MCNC: 97 MG/DL (ref 70–110)
HCT VFR BLD AUTO: 44.2 % (ref 40–54)
HGB BLD-MCNC: 13.5 G/DL (ref 14–18)
IMM GRANULOCYTES # BLD AUTO: 0.01 K/UL (ref 0–0.04)
IMM GRANULOCYTES NFR BLD AUTO: 0.2 % (ref 0–0.5)
LYMPHOCYTES # BLD AUTO: 1.7 K/UL (ref 1–4.8)
LYMPHOCYTES NFR BLD: 35.3 % (ref 18–48)
MCH RBC QN AUTO: 30 PG (ref 27–31)
MCHC RBC AUTO-ENTMCNC: 30.5 G/DL (ref 32–36)
MCV RBC AUTO: 98 FL (ref 82–98)
MONOCYTES # BLD AUTO: 0.4 K/UL (ref 0.3–1)
MONOCYTES NFR BLD: 7.6 % (ref 4–15)
NEUTROPHILS # BLD AUTO: 2.6 K/UL (ref 1.8–7.7)
NEUTROPHILS NFR BLD: 55.5 % (ref 38–73)
NRBC BLD-RTO: 0 /100 WBC
PLATELET # BLD AUTO: 243 K/UL (ref 150–350)
PMV BLD AUTO: 9.6 FL (ref 9.2–12.9)
POTASSIUM SERPL-SCNC: 4.2 MMOL/L (ref 3.5–5.1)
PROT SERPL-MCNC: 7.7 G/DL (ref 6–8.4)
RBC # BLD AUTO: 4.5 M/UL (ref 4.6–6.2)
SODIUM SERPL-SCNC: 138 MMOL/L (ref 136–145)
WBC # BLD AUTO: 4.73 K/UL (ref 3.9–12.7)

## 2020-02-24 PROCEDURE — 80053 COMPREHEN METABOLIC PANEL: CPT

## 2020-02-24 PROCEDURE — 85025 COMPLETE CBC W/AUTO DIFF WBC: CPT

## 2020-02-24 PROCEDURE — 86140 C-REACTIVE PROTEIN: CPT

## 2020-02-26 ENCOUNTER — TELEPHONE (OUTPATIENT)
Dept: INFECTIOUS DISEASES | Facility: CLINIC | Age: 65
End: 2020-02-26

## 2020-02-26 ENCOUNTER — PATIENT MESSAGE (OUTPATIENT)
Dept: INFECTIOUS DISEASES | Facility: CLINIC | Age: 65
End: 2020-02-26

## 2020-02-26 NOTE — TELEPHONE ENCOUNTER
----- Message from Jaydaadan Block sent at 2/24/2020  9:03 PM CST -----  Regarding: Lab Client Services  Good Afternoon,    My name is Jayda Block I work in the Lab Client Services. We had a problem with some lab work on this patient. If someone from your office could call us at [phone] or ext. [phone] that would be great. Anyone in my department can help.  Thank you

## 2020-02-27 ENCOUNTER — TELEPHONE (OUTPATIENT)
Dept: INFECTIOUS DISEASES | Facility: CLINIC | Age: 65
End: 2020-02-27

## 2020-02-27 ENCOUNTER — PATIENT OUTREACH (OUTPATIENT)
Dept: ADMINISTRATIVE | Facility: OTHER | Age: 65
End: 2020-02-27

## 2020-02-28 ENCOUNTER — OFFICE VISIT (OUTPATIENT)
Dept: INFECTIOUS DISEASES | Facility: CLINIC | Age: 65
End: 2020-02-28
Payer: COMMERCIAL

## 2020-02-28 VITALS
HEART RATE: 86 BPM | BODY MASS INDEX: 28.82 KG/M2 | WEIGHT: 183.63 LBS | SYSTOLIC BLOOD PRESSURE: 130 MMHG | HEIGHT: 67 IN | TEMPERATURE: 98 F | DIASTOLIC BLOOD PRESSURE: 70 MMHG

## 2020-02-28 DIAGNOSIS — L02.612 ABSCESS OF HEEL, LEFT: ICD-10-CM

## 2020-02-28 DIAGNOSIS — L02.818 CUTANEOUS ABSCESS OF OTHER SITE: Primary | ICD-10-CM

## 2020-02-28 PROCEDURE — 99214 PR OFFICE/OUTPT VISIT, EST, LEVL IV, 30-39 MIN: ICD-10-PCS | Mod: S$GLB,,, | Performed by: INTERNAL MEDICINE

## 2020-02-28 PROCEDURE — 99214 OFFICE O/P EST MOD 30 MIN: CPT | Mod: S$GLB,,, | Performed by: INTERNAL MEDICINE

## 2020-02-28 PROCEDURE — 3008F BODY MASS INDEX DOCD: CPT | Mod: CPTII,S$GLB,, | Performed by: INTERNAL MEDICINE

## 2020-02-28 PROCEDURE — 99999 PR PBB SHADOW E&M-EST. PATIENT-LVL III: CPT | Mod: PBBFAC,,, | Performed by: INTERNAL MEDICINE

## 2020-02-28 PROCEDURE — 99999 PR PBB SHADOW E&M-EST. PATIENT-LVL III: ICD-10-PCS | Mod: PBBFAC,,, | Performed by: INTERNAL MEDICINE

## 2020-02-28 PROCEDURE — 3008F PR BODY MASS INDEX (BMI) DOCUMENTED: ICD-10-PCS | Mod: CPTII,S$GLB,, | Performed by: INTERNAL MEDICINE

## 2020-02-28 NOTE — PROGRESS NOTES
Subjective:      Patient ID: Alessandro Cerna is a 64 y.o. male.    Chief Complaint:No chief complaint on file.    History of Present Illness    63 y/o male with no significant past medical history.  He is referred to me for management of infection of his left foot.  Symptoms started in November of 2019. He went to see a podiatrist for plantar fasciitis.  He was given an injection to his foot on November 20.  About 2-3 days afterwards, he states that his foot became swollen.  He was seen in the ER on November 27.  He was discharged from the ER on keflex with follow up with a different podiatrist.  He was seen by podiatry around December 5 and the keflex was extended.  He continued to have pain and was admitted in December for evaluation.  MRI showed findings concerning for abscess.  He underwent I&D of his left heel on December 30.  Cultures were positive for MSSA and Pseudomonas sp.  He was discharged on augmentin prior to the cultures finalizing.  He symptoms did not substantially improve. He was placed on levofloxacin by his PCP.      Review of Systems   Constitution: Positive for malaise/fatigue. Negative for chills, decreased appetite, fever, night sweats, weight gain and weight loss.   HENT: Negative for congestion, ear pain, hearing loss, hoarse voice, sore throat and tinnitus.    Eyes: Negative for blurred vision, redness and visual disturbance.   Cardiovascular: Negative for chest pain, leg swelling and palpitations.   Respiratory: Negative for cough, hemoptysis, shortness of breath and sputum production.    Hematologic/Lymphatic: Negative for adenopathy. Does not bruise/bleed easily.   Skin: Negative for dry skin, itching, rash and suspicious lesions.   Musculoskeletal: Positive for neck pain. Negative for back pain, joint pain and myalgias.   Gastrointestinal: Positive for diarrhea and heartburn. Negative for abdominal pain, constipation, nausea and vomiting.   Genitourinary: Negative for dysuria,  flank pain, frequency, hematuria, hesitancy and urgency.   Neurological: Positive for headaches and numbness. Negative for dizziness, paresthesias and weakness.   Psychiatric/Behavioral: Negative for depression and memory loss. The patient does not have insomnia and is not nervous/anxious.      Objective:   Physical Exam   Constitutional: He is oriented to person, place, and time. He appears well-developed and well-nourished. No distress.   HENT:   Head: Normocephalic and atraumatic.   Right Ear: External ear normal.   Left Ear: External ear normal.   Nose: Nose normal.   Mouth/Throat: Oropharynx is clear and moist. No oropharyngeal exudate.   Eyes: Pupils are equal, round, and reactive to light. Conjunctivae and EOM are normal. Right eye exhibits no discharge. Left eye exhibits no discharge. No scleral icterus.   Neck: Normal range of motion. Neck supple. No JVD present. No tracheal deviation present. No thyromegaly present.   Cardiovascular: Normal rate, regular rhythm, normal heart sounds and intact distal pulses. Exam reveals no gallop and no friction rub.   No murmur heard.  Pulmonary/Chest: Effort normal and breath sounds normal. No stridor. No respiratory distress. He has no wheezes. He has no rales. He exhibits no tenderness.   Abdominal: Soft. Bowel sounds are normal. He exhibits no distension and no mass. There is no tenderness. There is no rebound and no guarding.   Musculoskeletal: Normal range of motion. He exhibits no edema or tenderness.   Lymphadenopathy:     He has no cervical adenopathy.   Neurological: He is alert and oriented to person, place, and time. He has normal reflexes. He displays normal reflexes. No cranial nerve deficit. He exhibits normal muscle tone. Coordination normal.   Skin: Skin is warm. No rash noted. He is not diaphoretic. No erythema. No pallor.   Psychiatric: He has a normal mood and affect. His behavior is normal. Judgment and thought content normal.   Nursing note and  vitals reviewed.    Assessment:       1. Cutaneous abscess of other site    2. Abscess of heel, left        63 y/o male with a history of abscess of his left foot heel area.  Cultures were positive for MSSA and Pseudomonas sp.  He is here today for follow up.  He appears to have responded to levofloxacin followed by cefepime for 2 weeks.  His foot appears to have healed.  Will schedule his picc line to be removed.  Plan:       Cutaneous abscess of other site  -     Nursing communication; Future; Expected date: 02/28/2020    Abscess of heel, left  -     Nursing communication; Future; Expected date: 02/28/2020

## 2020-03-02 ENCOUNTER — INFUSION (OUTPATIENT)
Dept: INFECTIOUS DISEASES | Facility: HOSPITAL | Age: 65
End: 2020-03-02
Attending: INTERNAL MEDICINE
Payer: COMMERCIAL

## 2020-03-02 LAB
ACID FAST MOD KINY STN SPEC: NORMAL
ACID FAST MOD KINY STN SPEC: NORMAL
MYCOBACTERIUM SPEC QL CULT: NORMAL
MYCOBACTERIUM SPEC QL CULT: NORMAL

## 2020-03-02 NOTE — PROGRESS NOTES
Midline removed from left upper arm.  Catheter tip visualized and intact. Pressure dressing applied with vaseline occlusive dressing , 2x2 gauze and secured with coban.  No redness, ecchymosis, edema, swelling, or drainage noted at site. Pt. instructed to leave dressing in place for 24 hours. Verbalized understanding and left in NAD.

## 2020-03-04 ENCOUNTER — PATIENT OUTREACH (OUTPATIENT)
Dept: ADMINISTRATIVE | Facility: OTHER | Age: 65
End: 2020-03-04

## 2020-03-04 ENCOUNTER — OFFICE VISIT (OUTPATIENT)
Dept: PODIATRY | Facility: CLINIC | Age: 65
End: 2020-03-04
Payer: COMMERCIAL

## 2020-03-04 VITALS
HEIGHT: 67 IN | HEART RATE: 73 BPM | DIASTOLIC BLOOD PRESSURE: 68 MMHG | WEIGHT: 183 LBS | BODY MASS INDEX: 28.72 KG/M2 | SYSTOLIC BLOOD PRESSURE: 119 MMHG

## 2020-03-04 DIAGNOSIS — B35.3 TINEA PEDIS OF BOTH FEET: ICD-10-CM

## 2020-03-04 DIAGNOSIS — M79.672 LEFT FOOT PAIN: ICD-10-CM

## 2020-03-04 DIAGNOSIS — M62.469 GASTROCNEMIUS EQUINUS, UNSPECIFIED LATERALITY: ICD-10-CM

## 2020-03-04 DIAGNOSIS — M72.2 PLANTAR FASCIITIS: Primary | ICD-10-CM

## 2020-03-04 DIAGNOSIS — B35.1 ONYCHOMYCOSIS DUE TO DERMATOPHYTE: ICD-10-CM

## 2020-03-04 PROCEDURE — 99999 PR PBB SHADOW E&M-EST. PATIENT-LVL III: ICD-10-PCS | Mod: PBBFAC,,, | Performed by: STUDENT IN AN ORGANIZED HEALTH CARE EDUCATION/TRAINING PROGRAM

## 2020-03-04 PROCEDURE — 99999 PR PBB SHADOW E&M-EST. PATIENT-LVL III: CPT | Mod: PBBFAC,,, | Performed by: STUDENT IN AN ORGANIZED HEALTH CARE EDUCATION/TRAINING PROGRAM

## 2020-03-04 PROCEDURE — 3008F BODY MASS INDEX DOCD: CPT | Mod: CPTII,S$GLB,, | Performed by: STUDENT IN AN ORGANIZED HEALTH CARE EDUCATION/TRAINING PROGRAM

## 2020-03-04 PROCEDURE — 99214 PR OFFICE/OUTPT VISIT, EST, LEVL IV, 30-39 MIN: ICD-10-PCS | Mod: S$GLB,,, | Performed by: STUDENT IN AN ORGANIZED HEALTH CARE EDUCATION/TRAINING PROGRAM

## 2020-03-04 PROCEDURE — 99214 OFFICE O/P EST MOD 30 MIN: CPT | Mod: S$GLB,,, | Performed by: STUDENT IN AN ORGANIZED HEALTH CARE EDUCATION/TRAINING PROGRAM

## 2020-03-04 PROCEDURE — 3008F PR BODY MASS INDEX (BMI) DOCUMENTED: ICD-10-PCS | Mod: CPTII,S$GLB,, | Performed by: STUDENT IN AN ORGANIZED HEALTH CARE EDUCATION/TRAINING PROGRAM

## 2020-03-04 RX ORDER — CICLOPIROX 80 MG/ML
SOLUTION TOPICAL NIGHTLY
Qty: 1 BOTTLE | Refills: 0 | Status: SHIPPED | OUTPATIENT
Start: 2020-03-04 | End: 2020-12-18

## 2020-03-04 RX ORDER — MELOXICAM 7.5 MG/1
7.5 TABLET ORAL DAILY
Qty: 30 TABLET | Refills: 0 | Status: SHIPPED | OUTPATIENT
Start: 2020-03-04 | End: 2020-12-18

## 2020-03-04 RX ORDER — CLOTRIMAZOLE AND BETAMETHASONE DIPROPIONATE 10; .64 MG/G; MG/G
CREAM TOPICAL 2 TIMES DAILY
Qty: 1 TUBE | Refills: 0 | Status: SHIPPED | OUTPATIENT
Start: 2020-03-04 | End: 2021-06-10

## 2020-03-04 NOTE — PROGRESS NOTES
Subjective:      Patient ID: Alessandro Cerna is a 64 y.o. male.    Chief Complaint: Follow-up (Left)    Pt is a 63 y/o male  has a past medical history of Chronic seasonal allergic rhinitis, GERD (gastroesophageal reflux disease), and TB (pulmonary tuberculosis).   Presents as a follow up for left heel infection. Pt is s/p I&D 12/20/19 of left lateral heel for abscess. States he has missed one or two doses of antibiotics. Other than that, states he is still taking his antibiotics as prescribed. States he is still using crutches because he has mild heel pain. No other pedal complaints. Denies signs of infection. Denies nausea, vomiting, fever, chills, SOB, CP.    1/15/20: Pt seen today, states he is almost done with his antibiotics. Relates to mild pain, states he is hesitant to walk on it. No other pedal complaints.    2/19/20: Pt seen today, states he is on ABX per ID, relates to mild increased swelling to lateral left heel. Denies pain. No other pedal complaints.      3/4/20: Pt states finished antibiotics and all swelling and signs of infection are resolved. Denies further symptoms to lateral heel. No other pedal complaints.     Review of Systems   Constitution: Negative for chills, decreased appetite, diaphoresis and fever.   Cardiovascular: Negative for claudication and leg swelling.   Respiratory: Negative for shortness of breath.    Skin: Negative for color change, dry skin, flushing, itching, nail changes, poor wound healing, rash and suspicious lesions.   Musculoskeletal: Negative for joint pain, joint swelling and myalgias.   Gastrointestinal: Negative for nausea and vomiting.   Neurological: Negative for loss of balance, numbness and paresthesias.           Objective:      Physical Exam   Constitutional: He is oriented to person, place, and time. He appears well-developed and well-nourished. No distress.   Cardiovascular: Intact distal pulses.   Pulses:       Dorsalis pedis pulses are 2+ on the  right side, and 2+ on the left side.        Posterior tibial pulses are 2+ on the right side, and 2+ on the left side.   Dorsalis pedis and posterior tibial pulses are within normal limits. Skin temperature is within normal limits. Toes are cool to touch and feet are warm proximally. Hair growth is within normal limits. Skin is normotrophic and without hyperpigmentation. No edema noted.   Musculoskeletal: He exhibits deformity. He exhibits no edema or tenderness.        Right foot: There is decreased range of motion and deformity.        Left foot: There is decreased range of motion and deformity.        Feet:    Adequate joint range of motion without pain, limitation, nor crepitation to bilateral feet and ankle joints. Muscle strength is 5/5 in all groups bilaterally.      Bunions noted, sammy. Reducible hammer toes noted 2-5, sammy   Feet:   Right Foot:   Skin Integrity: Negative for ulcer, blister, skin breakdown, erythema, warmth, callus or dry skin.   Left Foot:   Skin Integrity: Negative for ulcer, blister, skin breakdown, erythema, warmth, callus or dry skin.   Neurological: He is alert and oriented to person, place, and time. No sensory deficit.   Light touch within normal limits bilaterally.       Skin: Skin is warm and dry. Capillary refill takes less than 2 seconds. No bruising, no ecchymosis, no laceration, no lesion, no petechiae and no rash noted. He is not diaphoretic. No erythema. No pallor.   Skin is warm, dry, and supple, bilaterally, no acute SOI noted, bilaterally, appears stable.     Nails 1-5 are discolored and mildly dystrophic, sammy.     Skin is very dry in moccasin patter to sammy foot.     No edema noted to heel, previous infection appears to be resolved, no SOI noted. No bogginess, fluctuance or crepitus. Appears stable.    Psychiatric: He has a normal mood and affect. His behavior is normal. Judgment and thought content normal.   Nursing note and vitals reviewed.            Assessment:        Encounter Diagnoses   Name Primary?    Plantar fasciitis Yes    Left foot pain     Gastrocnemius equinus, unspecified laterality     Tinea pedis of both feet     Onychomycosis due to dermatophyte          Plan:       Alessandro was seen today for follow-up.    Diagnoses and all orders for this visit:    Plantar fasciitis    Left foot pain    Gastrocnemius equinus, unspecified laterality    Tinea pedis of both feet    Onychomycosis due to dermatophyte    Other orders  -     ciclopirox (PENLAC) 8 % Soln; Apply topically nightly.  -     clotrimazole-betamethasone 1-0.05% (LOTRISONE) cream; Apply topically 2 (two) times daily.  -     meloxicam (MOBIC) 7.5 MG tablet; Take 1 tablet (7.5 mg total) by mouth once daily.      I counseled the patient on his conditions, their implications and medical management.    Plan to defer surgery for now, monitor for SOI. Infection appears resolved, appreciated ID recommendations and assistance.     For Plantar Fascitis:     1. Recommend supportive tennis shoes with rigid arch support, small heel and wide toe box to accomodate foot type, recommend stretching as demonstrated in clinic for equinus deformity.     2. RICE, pain medication PRN pain. Topical compound cream [facility]. Pt declines PT for now, will consider PT and custom orthotics in future if plantar fasciitis fails to resolve    Shoe inspection. General Foot Education. Patient instructed on proper foot hygeine. We discussed wearing proper shoe gear, daily foot inspections, never walking without protective shoe gear, never putting sharp instruments to feet. Patient reminded of the importance of good nutrition, weight loss if needed to help alleviate foot pressure/pain      Discussed general foot care:  Wear comfortable, proper fitting shoes. Wash feet daily. Dry well. After drying, apply moisturizer to feet (no lotion to webspaces). Inspect feet daily for skin breaks, blisters,  swelling, or redness. Wear cotton socks (preferably white)  Change socks every day. Do NOT walk barefoot. Do NOT use heating pads or warm/hot water soaks.     Discussed that fungus grows in warm and wet environments, pt to keep feet dry, change socks and shoes on a regular basis, topical antifungal script dispensed.     I discussed with the  patient  signs and symptoms of infection including redness, drainage, purulence, odor, pain, elevated BS, streaking, fever, chills, etc . Patient is to seek medical attention (ER or urgent care) if these symptoms occur       RTC in 1 mo, sooner PRN

## 2020-03-27 ENCOUNTER — DOCUMENTATION ONLY (OUTPATIENT)
Dept: REHABILITATION | Facility: HOSPITAL | Age: 65
End: 2020-03-27

## 2020-03-27 PROBLEM — M79.672 FOOT PAIN, LEFT: Status: RESOLVED | Noted: 2020-01-29 | Resolved: 2020-03-27

## 2020-03-27 PROBLEM — R26.9 ABNORMAL GAIT: Status: RESOLVED | Noted: 2020-01-29 | Resolved: 2020-03-27

## 2020-03-27 NOTE — PROGRESS NOTES
Outpatient Therapy Discharge Summary     Name: Alessandro Hernandez [facility] Number: [id number]    Therapy Diagnosis:        Encounter Diagnoses   Name Primary?    Abnormal gait      Foot pain, left        Physician: Karo Mora DPM    Physician Orders: PT Eval and Treat  Medical Diagnosis from Referral: Plantar fasciitis (M72.2)  Evaluation Date: 1/29/2020    Date of Last visit: 1/29/20  Total Visits Received: 1  Cancelled Visits: 1  No Show Visits: 0    Assessment    Goals: Unable to re-assess for d/c status due to non-visit.     In 3 weeks,    Goal Status   1. Patient will be independent with HEP to promote improved therapy outcomes.  STG     2.  Patient will improve L PF MMT to 3+/5 as evidenced by ability to perform SL heel raise with <5/10 pain to demonstrate increased functional mobility.   STG     3. Patient will walk for 30 minutes with <3/10 pain for improved functional mobility and performance of work/household duties.  STG           In 6 weeks,   Goal Status   4. Patient will be independent with progressed HEP to self manage symptoms.  LTG     5. Patient will improve SLS on L to 30 seconds without pain to improve ambulation and decrease fall risk.  LTG     6. Patient will perform SL heel raise with <2/10 pain to return to PLOF.  LTG     7. Patient will run for 30 minutes with <3/10 pain to return to playing soccer.  LTG            Discharge reason: Patient has not attended therapy since 1/29/20. Patient has not been compliant with POC.     Plan   This patient is discharged from Physical Therapy

## 2020-04-03 ENCOUNTER — LAB VISIT (OUTPATIENT)
Dept: LAB | Facility: HOSPITAL | Age: 65
End: 2020-04-03
Attending: FAMILY MEDICINE
Payer: COMMERCIAL

## 2020-04-03 DIAGNOSIS — Z91.89 10 YEAR RISK OF MI OR STROKE 7.5% OR GREATER: ICD-10-CM

## 2020-04-03 DIAGNOSIS — Z00.00 VISIT FOR WELL MAN HEALTH CHECK: ICD-10-CM

## 2020-04-03 DIAGNOSIS — E78.5 HYPERLIPIDEMIA, UNSPECIFIED HYPERLIPIDEMIA TYPE: Primary | ICD-10-CM

## 2020-04-03 DIAGNOSIS — R79.82 ELEVATED C-REACTIVE PROTEIN (CRP): ICD-10-CM

## 2020-04-03 DIAGNOSIS — Z12.5 SCREENING PSA (PROSTATE SPECIFIC ANTIGEN): ICD-10-CM

## 2020-04-03 DIAGNOSIS — E55.9 VITAMIN D DEFICIENCY: ICD-10-CM

## 2020-04-03 PROBLEM — E78.49 OTHER HYPERLIPIDEMIA: Status: ACTIVE | Noted: 2020-04-03

## 2020-04-03 LAB
25(OH)D3+25(OH)D2 SERPL-MCNC: 17 NG/ML (ref 30–96)
ALBUMIN SERPL BCP-MCNC: 4.1 G/DL (ref 3.5–5.2)
ALP SERPL-CCNC: 96 U/L (ref 55–135)
ALT SERPL W/O P-5'-P-CCNC: 18 U/L (ref 10–44)
ANION GAP SERPL CALC-SCNC: 7 MMOL/L (ref 8–16)
AST SERPL-CCNC: 24 U/L (ref 10–40)
BASOPHILS # BLD AUTO: 0.04 K/UL (ref 0–0.2)
BASOPHILS NFR BLD: 0.9 % (ref 0–1.9)
BILIRUB SERPL-MCNC: 1.4 MG/DL (ref 0.1–1)
BUN SERPL-MCNC: 14 MG/DL (ref 8–23)
CALCIUM SERPL-MCNC: 8.9 MG/DL (ref 8.7–10.5)
CHLORIDE SERPL-SCNC: 106 MMOL/L (ref 95–110)
CHOLEST SERPL-MCNC: 165 MG/DL (ref 120–199)
CHOLEST/HDLC SERPL: 4.9 {RATIO} (ref 2–5)
CO2 SERPL-SCNC: 27 MMOL/L (ref 23–29)
COMPLEXED PSA SERPL-MCNC: 1.4 NG/ML (ref 0–4)
CREAT SERPL-MCNC: 0.9 MG/DL (ref 0.5–1.4)
CRP SERPL-MCNC: 1.1 MG/L (ref 0–8.2)
DIFFERENTIAL METHOD: ABNORMAL
EOSINOPHIL # BLD AUTO: 0.1 K/UL (ref 0–0.5)
EOSINOPHIL NFR BLD: 1.8 % (ref 0–8)
ERYTHROCYTE [DISTWIDTH] IN BLOOD BY AUTOMATED COUNT: 13.4 % (ref 11.5–14.5)
ERYTHROCYTE [SEDIMENTATION RATE] IN BLOOD BY WESTERGREN METHOD: 3 MM/HR (ref 0–23)
EST. GFR  (AFRICAN AMERICAN): >60 ML/MIN/1.73 M^2
EST. GFR  (NON AFRICAN AMERICAN): >60 ML/MIN/1.73 M^2
ESTIMATED AVG GLUCOSE: 97 MG/DL (ref 68–131)
GLUCOSE SERPL-MCNC: 97 MG/DL (ref 70–110)
HBA1C MFR BLD HPLC: 5 % (ref 4–5.6)
HCT VFR BLD AUTO: 42.7 % (ref 40–54)
HDLC SERPL-MCNC: 34 MG/DL (ref 40–75)
HDLC SERPL: 20.6 % (ref 20–50)
HGB BLD-MCNC: 13.9 G/DL (ref 14–18)
IMM GRANULOCYTES # BLD AUTO: 0.01 K/UL (ref 0–0.04)
IMM GRANULOCYTES NFR BLD AUTO: 0.2 % (ref 0–0.5)
LDLC SERPL CALC-MCNC: 112.8 MG/DL (ref 63–159)
LYMPHOCYTES # BLD AUTO: 2.3 K/UL (ref 1–4.8)
LYMPHOCYTES NFR BLD: 50.8 % (ref 18–48)
MCH RBC QN AUTO: 31 PG (ref 27–31)
MCHC RBC AUTO-ENTMCNC: 32.6 G/DL (ref 32–36)
MCV RBC AUTO: 95 FL (ref 82–98)
MONOCYTES # BLD AUTO: 0.4 K/UL (ref 0.3–1)
MONOCYTES NFR BLD: 8.8 % (ref 4–15)
NEUTROPHILS # BLD AUTO: 1.7 K/UL (ref 1.8–7.7)
NEUTROPHILS NFR BLD: 37.5 % (ref 38–73)
NONHDLC SERPL-MCNC: 131 MG/DL
NRBC BLD-RTO: 0 /100 WBC
PLATELET # BLD AUTO: 232 K/UL (ref 150–350)
PMV BLD AUTO: 10.1 FL (ref 9.2–12.9)
POTASSIUM SERPL-SCNC: 4.5 MMOL/L (ref 3.5–5.1)
PROT SERPL-MCNC: 7.8 G/DL (ref 6–8.4)
RBC # BLD AUTO: 4.49 M/UL (ref 4.6–6.2)
SODIUM SERPL-SCNC: 140 MMOL/L (ref 136–145)
TRIGL SERPL-MCNC: 91 MG/DL (ref 30–150)
TSH SERPL DL<=0.005 MIU/L-ACNC: 1.63 UIU/ML (ref 0.4–4)
WBC # BLD AUTO: 4.53 K/UL (ref 3.9–12.7)

## 2020-04-03 PROCEDURE — 83036 HEMOGLOBIN GLYCOSYLATED A1C: CPT

## 2020-04-03 PROCEDURE — 84153 ASSAY OF PSA TOTAL: CPT

## 2020-04-03 PROCEDURE — 84443 ASSAY THYROID STIM HORMONE: CPT

## 2020-04-03 PROCEDURE — 86140 C-REACTIVE PROTEIN: CPT

## 2020-04-03 PROCEDURE — 85652 RBC SED RATE AUTOMATED: CPT

## 2020-04-03 PROCEDURE — 36415 COLL VENOUS BLD VENIPUNCTURE: CPT | Mod: PO

## 2020-04-03 PROCEDURE — 82306 VITAMIN D 25 HYDROXY: CPT

## 2020-04-03 PROCEDURE — 85025 COMPLETE CBC W/AUTO DIFF WBC: CPT

## 2020-04-03 PROCEDURE — 80053 COMPREHEN METABOLIC PANEL: CPT

## 2020-04-03 PROCEDURE — 80061 LIPID PANEL: CPT

## 2020-04-03 RX ORDER — ATORVASTATIN CALCIUM 10 MG/1
10 TABLET, FILM COATED ORAL NIGHTLY
Qty: 90 TABLET | Refills: 3 | Status: SHIPPED | OUTPATIENT
Start: 2020-04-03 | End: 2021-06-10 | Stop reason: SDUPTHER

## 2020-04-03 RX ORDER — ERGOCALCIFEROL 1.25 MG/1
50000 CAPSULE ORAL
Qty: 12 CAPSULE | Refills: 0 | Status: SHIPPED | OUTPATIENT
Start: 2020-04-03 | End: 2022-03-22 | Stop reason: SDUPTHER

## 2020-04-06 ENCOUNTER — PATIENT MESSAGE (OUTPATIENT)
Dept: FAMILY MEDICINE | Facility: CLINIC | Age: 65
End: 2020-04-06

## 2020-04-06 ENCOUNTER — TELEPHONE (OUTPATIENT)
Dept: FAMILY MEDICINE | Facility: CLINIC | Age: 65
End: 2020-04-06

## 2020-04-06 ENCOUNTER — PATIENT MESSAGE (OUTPATIENT)
Dept: INTERNAL MEDICINE | Facility: CLINIC | Age: 65
End: 2020-04-06

## 2020-04-06 NOTE — TELEPHONE ENCOUNTER
----- Message from Violetta England sent at 4/6/2020  3:50 PM CDT -----  Contact: self, [phone]  Patient would like to know why he was prescribed Lipitor 10 mg. Requests a call at 9:30 or 12:30 pm, message left on voicemail if he does not answer or a message on the portal please.

## 2020-04-07 ENCOUNTER — PATIENT OUTREACH (OUTPATIENT)
Dept: ADMINISTRATIVE | Facility: OTHER | Age: 65
End: 2020-04-07

## 2020-04-08 ENCOUNTER — PATIENT MESSAGE (OUTPATIENT)
Dept: FAMILY MEDICINE | Facility: CLINIC | Age: 65
End: 2020-04-08

## 2020-04-08 ENCOUNTER — TELEPHONE (OUTPATIENT)
Dept: FAMILY MEDICINE | Facility: CLINIC | Age: 65
End: 2020-04-08

## 2020-04-08 NOTE — TELEPHONE ENCOUNTER
----- Message from Pita Tse sent at 4/8/2020 10:05 AM CDT -----  Contact: Self [phone]  Patient is calling to talk to nurse in regards to see why the doctor prescribed  atorvastatin (LIPITOR) 10 MG tablet 90 tablet. Please advice

## 2020-11-09 ENCOUNTER — PATIENT OUTREACH (OUTPATIENT)
Dept: ADMINISTRATIVE | Facility: OTHER | Age: 65
End: 2020-11-09

## 2020-11-09 ENCOUNTER — TELEPHONE (OUTPATIENT)
Dept: OTOLARYNGOLOGY | Facility: CLINIC | Age: 65
End: 2020-11-09

## 2020-11-09 NOTE — TELEPHONE ENCOUNTER
Spoke with patient scheduled an appointment due to hearing loss on 11/11 at 1 PM with Audiologist and 1:40 with Dr Salomon. He was notified of the date and time of appointments

## 2020-11-09 NOTE — TELEPHONE ENCOUNTER
----- Message from Sarah Hunt sent at 11/9/2020 12:50 PM CST -----  Contact: [phone]/ PATIENT  CCTIMESENSITIVE         Name of Caller:     PATIENT   Reason for Visit/Symptoms:    HEARING LOSS  Best Contact Number or Confirm if Mychart Preferred: [phone]  Preferred Date/Time of Appointment: TODAY 11-9-20 OR TOMORROW 11-10-20  Interested in Virtual Visit: NO  Additional Information:NONE

## 2020-11-09 NOTE — PROGRESS NOTES
Health Maintenance Due   Topic Date Due    Shingles Vaccine (1 of 2) 06/01/2007    TETANUS VACCINE  10/25/2015    Influenza Vaccine (1) 08/01/2020     Updates were requested from care everywhere.  Chart was reviewed for overdue Proactive [facility] Encounters ([facility]) topics (CRS, Breast Cancer Screening, Eye exam)  Health Maintenance has been updated.  LINKS immunization registry triggered.  Immunizations were reconciled.

## 2020-11-11 ENCOUNTER — OFFICE VISIT (OUTPATIENT)
Dept: OTOLARYNGOLOGY | Facility: CLINIC | Age: 65
End: 2020-11-11
Payer: COMMERCIAL

## 2020-11-11 ENCOUNTER — CLINICAL SUPPORT (OUTPATIENT)
Dept: OTOLARYNGOLOGY | Facility: CLINIC | Age: 65
End: 2020-11-11
Payer: COMMERCIAL

## 2020-11-11 VITALS
BODY MASS INDEX: 27.21 KG/M2 | WEIGHT: 173.75 LBS | HEART RATE: 62 BPM | SYSTOLIC BLOOD PRESSURE: 122 MMHG | DIASTOLIC BLOOD PRESSURE: 77 MMHG

## 2020-11-11 DIAGNOSIS — H90.3 SENSORINEURAL HEARING LOSS, BILATERAL: Primary | ICD-10-CM

## 2020-11-11 DIAGNOSIS — H93.13 TINNITUS AURIUM, BILATERAL: ICD-10-CM

## 2020-11-11 DIAGNOSIS — H90.3 SENSORINEURAL HEARING LOSS (SNHL), BILATERAL: Primary | ICD-10-CM

## 2020-11-11 PROCEDURE — 99999 PR PBB SHADOW E&M-EST. PATIENT-LVL III: CPT | Mod: PBBFAC,,, | Performed by: OTOLARYNGOLOGY

## 2020-11-11 PROCEDURE — 99203 PR OFFICE/OUTPT VISIT, NEW, LEVL III, 30-44 MIN: ICD-10-PCS | Mod: S$GLB,,, | Performed by: OTOLARYNGOLOGY

## 2020-11-11 PROCEDURE — 92557 COMPREHENSIVE HEARING TEST: CPT | Mod: S$GLB,,, | Performed by: PHYSICIAN ASSISTANT

## 2020-11-11 PROCEDURE — 3008F BODY MASS INDEX DOCD: CPT | Mod: CPTII,S$GLB,, | Performed by: OTOLARYNGOLOGY

## 2020-11-11 PROCEDURE — 99203 OFFICE O/P NEW LOW 30 MIN: CPT | Mod: S$GLB,,, | Performed by: OTOLARYNGOLOGY

## 2020-11-11 PROCEDURE — 99999 PR PBB SHADOW E&M-EST. PATIENT-LVL III: ICD-10-PCS | Mod: PBBFAC,,, | Performed by: OTOLARYNGOLOGY

## 2020-11-11 PROCEDURE — 92557 PR COMPREHENSIVE HEARING TEST: ICD-10-PCS | Mod: S$GLB,,, | Performed by: PHYSICIAN ASSISTANT

## 2020-11-11 PROCEDURE — 92567 PR TYMPA2METRY: ICD-10-PCS | Mod: S$GLB,,, | Performed by: PHYSICIAN ASSISTANT

## 2020-11-11 PROCEDURE — 92567 TYMPANOMETRY: CPT | Mod: S$GLB,,, | Performed by: PHYSICIAN ASSISTANT

## 2020-11-11 PROCEDURE — 3008F PR BODY MASS INDEX (BMI) DOCUMENTED: ICD-10-PCS | Mod: CPTII,S$GLB,, | Performed by: OTOLARYNGOLOGY

## 2020-11-11 NOTE — PROGRESS NOTES
Alessandro Cerna was seen today in the clinic for an audiological evaluation.  His main complaints was noise in the ears.  He denied ear pain and dizziness.       Audiogram results revealed a moderate, high frequency sensorineural hearing loss bilaterally. Speech reception thresholds were noted at 20 dB in the right ear and 20 dB in the left ear.  Speech discrimination scores were 78% in the right ear and 80% in the left ear.  Tympanometry revealed Type A in the right ear and Type A in the left ear.    Recommendations:  1. Otologic evaluation  2. Annual audiogram  3. Noise protection when in noise  4. Hearing aid consultation when patient is ready

## 2020-11-11 NOTE — PROGRESS NOTES
Chief Complaint   Patient presents with    Hearing Loss     bilateral ears, started 1yr ago    Tinnitus   .     HPI:  Alessandro Cerna is a very pleasant 65 y.o. male here to see me today for the first time for evaluation of hearing loss.  He reports hearing loss that has been gradually progressing over the last 1 years.  He has noted any difference in hearing between the ears, with both ears being the worse hearing ear.  He has noted  tinnitus which is more prominent in the right ear.  He has not had any recent issues with ear pain or ear drainage.  He denies a family history of hearing loss, and has not had any previous otologic surgery.  He admits to any history of significant loud noise exposure.He denies issues with dizziness.      Past Medical History:   Diagnosis Date    Chronic seasonal allergic rhinitis 2017    GERD (gastroesophageal reflux disease)     TB (pulmonary tuberculosis) 1982    Treated [facility] 9 mo therapy.     Social History     Socioeconomic History    Marital status:      Spouse name: Not on file    Number of children: Not on file    Years of education: Not on file    Highest education level: Not on file   Occupational History    Not on file   Social Needs    Financial resource strain: Not on file    Food insecurity     Worry: Not on file     Inability: Not on file    Transportation needs     Medical: Not on file     Non-medical: Not on file   Tobacco Use    Smoking status: Former Smoker     Packs/day: 1.50     Years: 2.00     Pack years: 3.00     Types: Cigarettes     Quit date: 4/3/1977     Years since quittin.6    Smokeless tobacco: Never Used   Substance and Sexual Activity    Alcohol use: Yes     Alcohol/week: 3.0 standard drinks     Types: 3 Cans of beer per week     Frequency: 2-3 times a week     Comment: one drink/week    Drug use: No    Sexual activity: Yes     Partners: Female   Lifestyle    Physical activity     Days per week: Not on file      Minutes per session: Not on file    Stress: Not on file   Relationships    Social connections     Talks on phone: Not on file     Gets together: Not on file     Attends Christianity service: Not on file     Active member of club or organization: Not on file     Attends meetings of clubs or organizations: Not on file     Relationship status: Not on file   Other Topics Concern    Not on file   Social History Narrative    Mom had breast cancer, dad had prostate cancer    Social: patient has a cleaning company. He lives with his wife at home. Has a dog at home. No smokers at home. He has 3 kids; they lives nearby. 2 grandchildren (19 and 12).      Past Surgical History:   Procedure Laterality Date    IRRIGATION AND DEBRIDEMENT OF LOWER EXTREMITY Left 12/30/2019    Procedure: IRRIGATION AND DEBRIDEMENT, LOWER EXTREMITY Bone biopsy heel;  Surgeon: Karo Marsh MD;  [facility];  Service: Podiatry;  Laterality: Left;     Family History   Problem Relation Age of Onset    Breast cancer Mother     Cancer Mother     Prostate cancer Father     Cancer Father     No Known Problems Sister     No Known Problems Brother     No Known Problems Brother     No Known Problems Brother     Emphysema Neg Hx          Review of Systems  General: negative for chills, fever or weight loss  Psychological: negative for mood changes or depression  Ophthalmic: negative for blurry vision, photophobia or eye pain  ENT: see HPI  Respiratory: no cough, shortness of breath, or wheezing  Cardiovascular: no chest pain or dyspnea on exertion  Gastrointestinal: no abdominal pain, change in bowel habits, or black/ bloody stools  Musculoskeletal: negative for gait disturbance or muscular weakness  Neurological: no syncope or seizures; no ataxia  Dermatological: negative for puritis,  rash and jaundice  Hematologic/lymphatic: no easy bruising, no new lumps or bumps      Physical Exam:    Vitals:    11/11/20 1339   BP: 122/77   Pulse: 62        Constitutional: Well appearing / communicating without difficutly.  NAD.  Eyes: EOM I Bilaterally  Head/Face: Normocephalic.  Negative paranasal sinus pressure/tenderness.  Salivary glands WNL.  House Brackmann I Bilaterally.    Right Ear: Auricle normal appearance. External Auditory Canal within normal limits no lesions or masses,TM w/o masses/lesions/perforations. TM mobility noted.   Left Ear: Auricle normal appearance. External Auditory Canal within normal limits no lesions or masses,TM w/o masses/lesions/perforations. TM mobility noted.  Rinne Air conduction >bone conduction bilaterally, Fields midline.   Nose: No gross nasal septal deviation. Inferior Turbinates 3+ bilaterally. No septal perforation. No masses/lesions. External nasal skin appears normal without masses/lesions.  Oral Cavity: Gingiva/lips within normal limits.  Dentition/gingiva healthy appearing. Mucus membranes moist. Floor of mouth soft, no masses palpated. Oral Tongue mobile. Hard Palate appears normal.    Oropharynx: Base of tongue appears normal. No masses/lesions noted. Tonsillar fossa/pharyngeal wall without lesions. Posterior oropharynx WNL.  Soft palate without masses. Midline uvula.   Neck/Lymphatic: No LAD I-VI bilaterally.  No thyromegaly.  No masses noted on exam.    Mirror laryngoscopy/nasopharyngoscopy: Active gag reflex.  Unable to perform.    Neuro/Psychiatric: AOx3.  Normal mood and affect.   Cardiovascular: Normal carotid pulses bilaterally, no increasing jugular venous distention noted at cervical region bilaterally.    Respiratory: Normal respiratory effort, no stridor, no retractions noted.    Diagnostic studies reviewed:   Audiogram reviewed personally by myself and in detail with the patient today.           Assessment:    ICD-10-CM ICD-9-CM    1. Sensorineural hearing loss (SNHL), bilateral  H90.3 389.18    2. Tinnitus aurium, bilateral  H93.13 388.31      The primary encounter diagnosis was Sensorineural hearing  loss (SNHL), bilateral. A diagnosis of Tinnitus aurium, bilateral was also pertinent to this visit.      Plan:  No orders of the defined types were placed in this encounter.     We reviewed the patient's recent audiogram and hearing loss in detail.  We also discussed that he is a good candidate for hearing aids, if and when he the patient is motivated.  He will contact audiology when ready to proceed.  We also discussed the use hearing protection when exposed to loud noise, including lawn equipment.     We also discussed that tinnitus is most often caused by a hearing loss, and that as the hair cells are damaged, either genetic or as a result of loud noise exposure, they then cause tinnitus.  Some patients find that restricting the salt or caffeine in their diet helps.  Tinnitus tends to be louder in times of stress and fatigue, and may decrease with time.  Sound machines may also be an effective masking technique if needed at night.    Thank you kindly for allowing me to participate in the patient's care.       Mikaela Solomon MD

## 2020-11-11 NOTE — PATIENT INSTRUCTIONS
WHAT IS TINNITUS?  The perception of sound heard in one or both sides of the head. Some refer to it as a ringing, noise, buzzing, roaring, clicking, wooshing, crickets, heartbeat, music, or other noises. There are varying levels of severity. The tinnitus may be constant or periodic. Common complaints include difficulty sleeping, struggling to understand other's speech, depression, and problems focusing.  Tinnitus is a symptom, not a disease. It affects 10-15% of adults in the [place].    WHAT CAUSES TINNITUS?  There are 2 types of tinnitus: Primary and Secondary. Primary tinnitus has an unknown cause. There may be a relationship to primary tinnitus and hearing loss. Secondary tinnitus has a cause which could be impacted cerumen (wax), or diseases or pressure behind the eardrum. It could also be related to Meniere's disease.   There are several likely sources, all of which are known to trigger or worsen tinnitus such as noise exposure, head/neck trauma, ototoxic drugs (www.drugOlocity.com), tumors, blood vessel problems, cardiovascular disease or jaw misalignment.     WHAT CAN YOU DO?  You should seek medical care if you suspect you have tinnitus and tell your physician if your symptoms are affecting your daily life. Tinnitus may cause anxiety, depression, insomnia, negative emotions, and withdrawal. It's okay to seek help as your symptoms may be managed, and common.    HOW IS TINNITUS DIAGNOSED?  A doctor can diagnose tinnitus after a physical examination and interview. The examination may rule out conditions causing the tinnitus such as wax impaction or fluid behind the eardrum. Tinnitus may also be related to hearing loss, especially hearing loss from frequent noise exposure. A hearing test may be performed if you are experiencing tinnitus.    TREATMENT FOR TINNITUS?  Treatment may improve on its own but if it doesn't there are several ways to manage your symptoms although there is no cure. Possible treatment  options include amplification (hearing aids), sound therapy (maskers,white noise,etc.), TMJ treatment, cognitive behavioral therapy, relaxation exercises, and managing your medications.  There is not enough evidence to suggest that over the counter products help patients with tinnitus. Acupuncture can neither be recommended or discouraged due to lack of evidence as well.    Source: American Academy of Otolaryngology-Head And Neck Surgery

## 2020-12-03 ENCOUNTER — TELEPHONE (OUTPATIENT)
Dept: UROLOGY | Facility: CLINIC | Age: 65
End: 2020-12-03

## 2020-12-03 NOTE — TELEPHONE ENCOUNTER
----- Message from Vandana Grimaldo RN sent at 12/3/2020  5:21 PM CST -----    ----- Message -----  From: Cassie Meyer  Sent: 12/3/2020   3:56 PM CST  To: [facility] Staff    Alessandro Cerna calling regarding Refills  (message) for #tadalafil (CIALIS) 20 MG Tab. Pt is a former of Dr. Villalba. [phone]      [facility] ([place] - [place] - [address]  [address] [place] [address]  Phone: [phone] Fax: [phone]

## 2020-12-04 RX ORDER — TADALAFIL 20 MG/1
20 TABLET ORAL EVERY OTHER DAY
Qty: 8 TABLET | Refills: 11 | Status: SHIPPED | OUTPATIENT
Start: 2020-12-04 | End: 2020-12-18 | Stop reason: SDUPTHER

## 2020-12-04 NOTE — TELEPHONE ENCOUNTER
----- Message from Caitlyn Dorsey sent at 12/3/2020  5:27 PM CST -----  Contact: [phone]  Type:  RX Refill Request    Who Called: pt  Refill or New Rx:refill  RX Name and Strength:tadalafil (CIALIS) 20 MG Tab  How is the patient currently taking it? (ex. 1XDay):1 a day   Is this a 30 day or 90 day RX:30  Preferred Pharmacy with phone number:[phone]  Local or Mail Order:local  Would the patient rather a call back or a response via [facility]? Call back  Best Call Back Number:[phone]  Additional Information: pt is out

## 2020-12-16 ENCOUNTER — PATIENT OUTREACH (OUTPATIENT)
Dept: ADMINISTRATIVE | Facility: OTHER | Age: 65
End: 2020-12-16

## 2020-12-16 NOTE — PROGRESS NOTES
LINKS immunization registry not responding  Care Everywhere updated  Health Maintenance updated  Chart reviewed for overdue [facility] Encounters ([facility]) health maintenance testing (CRS, Breast Ca, Diabetic Eye Exam)   Orders entered:N/A

## 2020-12-18 ENCOUNTER — OFFICE VISIT (OUTPATIENT)
Dept: UROLOGY | Facility: CLINIC | Age: 65
End: 2020-12-18
Payer: COMMERCIAL

## 2020-12-18 VITALS
WEIGHT: 175.81 LBS | HEIGHT: 67 IN | HEART RATE: 59 BPM | DIASTOLIC BLOOD PRESSURE: 69 MMHG | SYSTOLIC BLOOD PRESSURE: 118 MMHG | BODY MASS INDEX: 27.6 KG/M2

## 2020-12-18 DIAGNOSIS — N40.1 BPH WITH URINARY OBSTRUCTION: Primary | ICD-10-CM

## 2020-12-18 DIAGNOSIS — N13.8 BPH WITH URINARY OBSTRUCTION: Primary | ICD-10-CM

## 2020-12-18 DIAGNOSIS — N52.01 ERECTILE DYSFUNCTION DUE TO ARTERIAL INSUFFICIENCY: ICD-10-CM

## 2020-12-18 LAB
BILIRUB SERPL-MCNC: NORMAL MG/DL
BLOOD URINE, POC: NORMAL
CLARITY, POC UA: CLEAR
COLOR, POC UA: YELLOW
GLUCOSE UR QL STRIP: NORMAL
KETONES UR QL STRIP: NORMAL
LEUKOCYTE ESTERASE URINE, POC: NORMAL
NITRITE, POC UA: NORMAL
PH, POC UA: 6
PROTEIN, POC: NORMAL
SPECIFIC GRAVITY, POC UA: 1.02
UROBILINOGEN, POC UA: NORMAL

## 2020-12-18 PROCEDURE — 81002 URINALYSIS NONAUTO W/O SCOPE: CPT | Mod: S$GLB,,, | Performed by: NURSE PRACTITIONER

## 2020-12-18 PROCEDURE — 3008F BODY MASS INDEX DOCD: CPT | Mod: CPTII,S$GLB,, | Performed by: NURSE PRACTITIONER

## 2020-12-18 PROCEDURE — 1126F PR PAIN SEVERITY QUANTIFIED, NO PAIN PRESENT: ICD-10-PCS | Mod: S$GLB,,, | Performed by: NURSE PRACTITIONER

## 2020-12-18 PROCEDURE — 99999 PR PBB SHADOW E&M-EST. PATIENT-LVL III: ICD-10-PCS | Mod: PBBFAC,,, | Performed by: NURSE PRACTITIONER

## 2020-12-18 PROCEDURE — 81002 POCT URINE DIPSTICK WITHOUT MICROSCOPE: ICD-10-PCS | Mod: S$GLB,,, | Performed by: NURSE PRACTITIONER

## 2020-12-18 PROCEDURE — 99999 PR PBB SHADOW E&M-EST. PATIENT-LVL III: CPT | Mod: PBBFAC,,, | Performed by: NURSE PRACTITIONER

## 2020-12-18 PROCEDURE — 3008F PR BODY MASS INDEX (BMI) DOCUMENTED: ICD-10-PCS | Mod: CPTII,S$GLB,, | Performed by: NURSE PRACTITIONER

## 2020-12-18 PROCEDURE — 99214 PR OFFICE/OUTPT VISIT, EST, LEVL IV, 30-39 MIN: ICD-10-PCS | Mod: 25,S$GLB,, | Performed by: NURSE PRACTITIONER

## 2020-12-18 PROCEDURE — 1126F AMNT PAIN NOTED NONE PRSNT: CPT | Mod: S$GLB,,, | Performed by: NURSE PRACTITIONER

## 2020-12-18 PROCEDURE — 99214 OFFICE O/P EST MOD 30 MIN: CPT | Mod: 25,S$GLB,, | Performed by: NURSE PRACTITIONER

## 2020-12-18 RX ORDER — TADALAFIL 20 MG/1
20 TABLET ORAL DAILY PRN
Qty: 10 TABLET | Refills: 12 | Status: SHIPPED | OUTPATIENT
Start: 2020-12-18 | End: 2021-12-21

## 2020-12-18 NOTE — PROGRESS NOTES
CHIEF COMPLAINT:    Alessandro Cerna is a 65 y.o. male presents today for annual exam.    HISTORY OF PRESENTING ILLINESS:    Alessandro Cerna is a 65 y.o. male with a history of BPH and ED.  He was last seen in clinic with Dr. Villalba 08/08/2019.  He was taking Cialis 20mg PRN for ED.     He states he gets results from the Cialis.  Reports some issues with urination but overall happy.  Not concerned.  PCP does his PSA/prostate checks.          REVIEW OF SYSTEMS:  Review of Systems   Constitutional: Negative.  Negative for chills, diaphoresis and fever.   HENT: Negative for congestion and sore throat.    Eyes: Negative.  Negative for double vision.   Respiratory: Negative.  Negative for cough, shortness of breath and wheezing.    Cardiovascular: Negative.  Negative for chest pain, palpitations and leg swelling.   Gastrointestinal: Negative.  Negative for abdominal pain, blood in stool, constipation, diarrhea, nausea and vomiting.   Genitourinary: Negative.  Negative for dysuria, flank pain and hematuria.        Ok with how he urinates.  Sometimes 1st am urination is slow  Not up all night     Musculoskeletal: Negative.  Negative for back pain, falls and neck pain.   Skin: Negative.  Negative for rash.   Neurological: Negative.  Negative for dizziness and seizures.   Endo/Heme/Allergies: Does not bruise/bleed easily.   Psychiatric/Behavioral: Negative.  Negative for depression. The patient is not nervous/anxious.          PATIENT HISTORY:    Past Medical History:   Diagnosis Date    Chronic seasonal allergic rhinitis 11/16/2017    GERD (gastroesophageal reflux disease)     TB (pulmonary tuberculosis) 1982    Treated [facility] 9 mo therapy.       Past Surgical History:   Procedure Laterality Date    IRRIGATION AND DEBRIDEMENT OF LOWER EXTREMITY Left 12/30/2019    Procedure: IRRIGATION AND DEBRIDEMENT, LOWER EXTREMITY Bone biopsy heel;  Surgeon: Karo Marsh MD;  Location: [facility] OR;  Service: Podiatry;   Laterality: Left;       Family History   Problem Relation Age of Onset    Breast cancer Mother     Cancer Mother     Prostate cancer Father     Cancer Father     No Known Problems Sister     No Known Problems Brother     No Known Problems Brother     No Known Problems Brother     Emphysema Neg Hx        Social History     Socioeconomic History    Marital status:      Spouse name: Not on file    Number of children: Not on file    Years of education: Not on file    Highest education level: Not on file   Occupational History    Not on file   Social Needs    Financial resource strain: Not on file    Food insecurity     Worry: Not on file     Inability: Not on file    Transportation needs     Medical: Not on file     Non-medical: Not on file   Tobacco Use    Smoking status: Former Smoker     Packs/day: 1.50     Years: 2.00     Pack years: 3.00     Types: Cigarettes     Quit date: 4/3/1977     Years since quittin.7    Smokeless tobacco: Never Used   Substance and Sexual Activity    Alcohol use: Yes     Alcohol/week: 3.0 standard drinks     Types: 3 Cans of beer per week     Frequency: 2-3 times a week     Comment: one drink/week    Drug use: No    Sexual activity: Yes     Partners: Female   Lifestyle    Physical activity     Days per week: Not on file     Minutes per session: Not on file    Stress: Not on file   Relationships    Social connections     Talks on phone: Not on file     Gets together: Not on file     Attends Anabaptist service: Not on file     Active member of club or organization: Not on file     Attends meetings of clubs or organizations: Not on file     Relationship status: Not on file   Other Topics Concern    Not on file   Social History Narrative    Mom had breast cancer, dad had prostate cancer    Social: patient has a cleaning company. He lives with his wife at home. Has a dog at home. No smokers at home. He has 3 kids; they lives nearby. 2 grandchildren (19 and  12).        Allergies:  Patient has no known allergies.    Medications:    Current Outpatient Medications:     atorvastatin (LIPITOR) 10 MG tablet, Take 1 tablet (10 mg total) by mouth every evening., Disp: 90 tablet, Rfl: 3    cetirizine (ZYRTEC) 10 MG tablet, Take 1 tablet (10 mg total) by mouth every evening., Disp: 90 tablet, Rfl: 3    clotrimazole-betamethasone 1-0.05% (LOTRISONE) cream, Apply topically 2 (two) times daily., Disp: 1 Tube, Rfl: 0    ergocalciferol (ERGOCALCIFEROL) 50,000 unit Cap, Take 1 capsule (50,000 Units total) by mouth every 7 days. Then start daily OTC replacement after this Rx is complete, Disp: 12 capsule, Rfl: 0    tadalafiL (CIALIS) 20 MG Tab, Take 1 tablet (20 mg total) by mouth daily as needed. Take every other day as needed, Disp: 10 tablet, Rfl: 12    PHYSICAL EXAMINATION:  Physical Exam  Vitals signs and nursing note reviewed.   Constitutional:       General: He is awake.      Appearance: Normal appearance. He is normal weight.   HENT:      Head: Normocephalic.      Right Ear: External ear normal.      Left Ear: External ear normal.      Nose: Nose normal.   Eyes:      Conjunctiva/sclera: Conjunctivae normal.   Neck:      Musculoskeletal: Normal range of motion.   Cardiovascular:      Rate and Rhythm: Normal rate.   Pulmonary:      Effort: Pulmonary effort is normal. No respiratory distress.   Abdominal:      General: There is no distension.      Tenderness: There is no abdominal tenderness. There is no right CVA tenderness, left CVA tenderness or rebound.   Genitourinary:     Penis: Normal and circumcised. No hypospadias, erythema, tenderness or discharge.       Scrotum/Testes: Normal.         Right: Tenderness or swelling not present.         Left: Tenderness or swelling not present.      Prostate: Enlarged. Not tender and no nodules present.      Rectum: Normal.   Musculoskeletal: Normal range of motion.   Skin:     General: Skin is warm and dry.   Neurological:       General: No focal deficit present.      Mental Status: He is alert and oriented to person, place, and time.   Psychiatric:         Mood and Affect: Mood normal.         Behavior: Behavior is cooperative.           LABS:      In office UA today was clear of infection.       Lab Results   Component Value Date    PSA 1.4 04/03/2020    PSA 1.2 04/05/2019    PSA 1.5 11/16/2017             IMPRESSION:    Encounter Diagnoses   Name Primary?    BPH with urinary obstruction Yes    Erectile dysfunction due to arterial insufficiency          Assessment:       1. BPH with urinary obstruction    2. Erectile dysfunction due to arterial insufficiency        Plan:         I spent 25 minutes with the patient of which more than half was spent in direct consultation with the patient in regards to our treatment and plan.    Education and recommendations of today's plan of care including home remedies.   We discussed his BPH and LUTS.  Discussed diet modifications; reducing PM fluids/bladder irritants.  We discussed medical management with alpha blocker but he ok with how things.   Refilled his Cialis to [facility]; discussed taking on empty stomach.  Daily multivitamin and amino acid.  Continue PSA with PCP.  RTC one year.

## 2020-12-18 NOTE — PATIENT INSTRUCTIONS
L-Arginine 1000mg daily  (nature's bounty)    Understanding Erectile Dysfunction    Erectile dysfunction (ED) is a problem getting an erection firm enough or keeping it long enough for intercourse. The problem can happen to any man at any age. But health problems that can lead to ED become more common as a man ages. Up to half of men over age 40 experience ED at some point.  Causes of ED  ED can have many causes. Most are physical. Some are emotional issues. Often, a combination of causes is involved. Causes of ED may include:  · Medical conditions such as diabetes or depression  · Smoking tobacco or marijuana  · Drinking too much alcohol  · Side effects of medications  · Injury to nerves or blood vessels  · Emotional issues such as stress or relationship problems  ED can be treated  Prescription medications for ED are available. They help many men who try them. Depending upon the cause of the ED, though, medications may not be enough. In these cases, other treatment options are available. These include erectile aids and surgery. Your health care provider can tell you more about the treatment that is right for you. And new treatments for ED are being studied. No matter what the treatment you decide on, stay in touch with your doctor. If your symptoms persist, he or she may be able to adjust your current treatment or try something new.  Date Last Reviewed: 1/1/2017  © 1868-0293 [employer]. [address] [place] [address], [place] [address]. All rights reserved. This information is not intended as a substitute for professional medical care. Always follow your healthcare professional's instructions.

## 2021-04-05 ENCOUNTER — PATIENT MESSAGE (OUTPATIENT)
Dept: ADMINISTRATIVE | Facility: HOSPITAL | Age: 66
End: 2021-04-05

## 2021-06-10 ENCOUNTER — LAB VISIT (OUTPATIENT)
Dept: LAB | Facility: HOSPITAL | Age: 66
End: 2021-06-10
Attending: FAMILY MEDICINE
Payer: COMMERCIAL

## 2021-06-10 ENCOUNTER — OFFICE VISIT (OUTPATIENT)
Dept: FAMILY MEDICINE | Facility: CLINIC | Age: 66
End: 2021-06-10
Payer: COMMERCIAL

## 2021-06-10 VITALS
HEART RATE: 75 BPM | BODY MASS INDEX: 28.02 KG/M2 | WEIGHT: 178.56 LBS | SYSTOLIC BLOOD PRESSURE: 100 MMHG | OXYGEN SATURATION: 98 % | DIASTOLIC BLOOD PRESSURE: 62 MMHG | HEIGHT: 67 IN

## 2021-06-10 DIAGNOSIS — K92.1 MELENA: ICD-10-CM

## 2021-06-10 DIAGNOSIS — Z91.89 10 YEAR RISK OF MI OR STROKE 7.5% OR GREATER: ICD-10-CM

## 2021-06-10 DIAGNOSIS — K92.1 HEMATOCHEZIA: ICD-10-CM

## 2021-06-10 DIAGNOSIS — E78.49 OTHER HYPERLIPIDEMIA: ICD-10-CM

## 2021-06-10 DIAGNOSIS — Z00.00 VISIT FOR WELL MAN HEALTH CHECK: ICD-10-CM

## 2021-06-10 DIAGNOSIS — E55.9 VITAMIN D DEFICIENCY: ICD-10-CM

## 2021-06-10 DIAGNOSIS — Z00.00 VISIT FOR WELL MAN HEALTH CHECK: Primary | ICD-10-CM

## 2021-06-10 DIAGNOSIS — Z12.5 SCREENING PSA (PROSTATE SPECIFIC ANTIGEN): ICD-10-CM

## 2021-06-10 DIAGNOSIS — K21.9 GERD WITHOUT ESOPHAGITIS: ICD-10-CM

## 2021-06-10 LAB
25(OH)D3+25(OH)D2 SERPL-MCNC: 24 NG/ML (ref 30–96)
ALBUMIN SERPL BCP-MCNC: 4.2 G/DL (ref 3.5–5.2)
ALP SERPL-CCNC: 92 U/L (ref 55–135)
ALT SERPL W/O P-5'-P-CCNC: 18 U/L (ref 10–44)
ANION GAP SERPL CALC-SCNC: 8 MMOL/L (ref 8–16)
AST SERPL-CCNC: 24 U/L (ref 10–40)
BASOPHILS # BLD AUTO: 0.05 K/UL (ref 0–0.2)
BASOPHILS NFR BLD: 0.9 % (ref 0–1.9)
BILIRUB SERPL-MCNC: 1.1 MG/DL (ref 0.1–1)
BUN SERPL-MCNC: 13 MG/DL (ref 8–23)
CALCIUM SERPL-MCNC: 9.2 MG/DL (ref 8.7–10.5)
CHLORIDE SERPL-SCNC: 104 MMOL/L (ref 95–110)
CHOLEST SERPL-MCNC: 130 MG/DL (ref 120–199)
CHOLEST/HDLC SERPL: 3.8 {RATIO} (ref 2–5)
CO2 SERPL-SCNC: 29 MMOL/L (ref 23–29)
COMPLEXED PSA SERPL-MCNC: 1.3 NG/ML (ref 0–4)
CREAT SERPL-MCNC: 1 MG/DL (ref 0.5–1.4)
CRP SERPL-MCNC: 0.6 MG/L (ref 0–8.2)
DIFFERENTIAL METHOD: ABNORMAL
EOSINOPHIL # BLD AUTO: 0 K/UL (ref 0–0.5)
EOSINOPHIL NFR BLD: 0.7 % (ref 0–8)
ERYTHROCYTE [DISTWIDTH] IN BLOOD BY AUTOMATED COUNT: 13.6 % (ref 11.5–14.5)
ERYTHROCYTE [SEDIMENTATION RATE] IN BLOOD BY WESTERGREN METHOD: <2 MM/HR (ref 0–23)
EST. GFR  (AFRICAN AMERICAN): >60 ML/MIN/1.73 M^2
EST. GFR  (NON AFRICAN AMERICAN): >60 ML/MIN/1.73 M^2
ESTIMATED AVG GLUCOSE: 103 MG/DL (ref 68–131)
FERRITIN SERPL-MCNC: 189 NG/ML (ref 20–300)
GLUCOSE SERPL-MCNC: 101 MG/DL (ref 70–110)
HBA1C MFR BLD: 5.2 % (ref 4–5.6)
HCT VFR BLD AUTO: 41.3 % (ref 40–54)
HDLC SERPL-MCNC: 34 MG/DL (ref 40–75)
HDLC SERPL: 26.2 % (ref 20–50)
HGB BLD-MCNC: 13.5 G/DL (ref 14–18)
IMM GRANULOCYTES # BLD AUTO: 0.01 K/UL (ref 0–0.04)
IMM GRANULOCYTES NFR BLD AUTO: 0.2 % (ref 0–0.5)
IRON SERPL-MCNC: 119 UG/DL (ref 45–160)
LDLC SERPL CALC-MCNC: 61.2 MG/DL (ref 63–159)
LYMPHOCYTES # BLD AUTO: 2.5 K/UL (ref 1–4.8)
LYMPHOCYTES NFR BLD: 45.7 % (ref 18–48)
MCH RBC QN AUTO: 30.9 PG (ref 27–31)
MCHC RBC AUTO-ENTMCNC: 32.7 G/DL (ref 32–36)
MCV RBC AUTO: 95 FL (ref 82–98)
MONOCYTES # BLD AUTO: 0.5 K/UL (ref 0.3–1)
MONOCYTES NFR BLD: 8.3 % (ref 4–15)
NEUTROPHILS # BLD AUTO: 2.4 K/UL (ref 1.8–7.7)
NEUTROPHILS NFR BLD: 44.2 % (ref 38–73)
NONHDLC SERPL-MCNC: 96 MG/DL
NRBC BLD-RTO: 0 /100 WBC
PLATELET # BLD AUTO: 241 K/UL (ref 150–450)
PMV BLD AUTO: 9.7 FL (ref 9.2–12.9)
POTASSIUM SERPL-SCNC: 4.1 MMOL/L (ref 3.5–5.1)
PROT SERPL-MCNC: 7.8 G/DL (ref 6–8.4)
RBC # BLD AUTO: 4.37 M/UL (ref 4.6–6.2)
SATURATED IRON: 32 % (ref 20–50)
SODIUM SERPL-SCNC: 141 MMOL/L (ref 136–145)
TOTAL IRON BINDING CAPACITY: 374 UG/DL (ref 250–450)
TRANSFERRIN SERPL-MCNC: 253 MG/DL (ref 200–375)
TRIGL SERPL-MCNC: 174 MG/DL (ref 30–150)
TSH SERPL DL<=0.005 MIU/L-ACNC: 1.15 UIU/ML (ref 0.4–4)
WBC # BLD AUTO: 5.52 K/UL (ref 3.9–12.7)

## 2021-06-10 PROCEDURE — 3008F PR BODY MASS INDEX (BMI) DOCUMENTED: ICD-10-PCS | Mod: CPTII,S$GLB,, | Performed by: FAMILY MEDICINE

## 2021-06-10 PROCEDURE — 84153 ASSAY OF PSA TOTAL: CPT | Performed by: FAMILY MEDICINE

## 2021-06-10 PROCEDURE — 85025 COMPLETE CBC W/AUTO DIFF WBC: CPT | Performed by: FAMILY MEDICINE

## 2021-06-10 PROCEDURE — 3008F BODY MASS INDEX DOCD: CPT | Mod: CPTII,S$GLB,, | Performed by: FAMILY MEDICINE

## 2021-06-10 PROCEDURE — 36415 COLL VENOUS BLD VENIPUNCTURE: CPT | Mod: PO | Performed by: FAMILY MEDICINE

## 2021-06-10 PROCEDURE — 84443 ASSAY THYROID STIM HORMONE: CPT | Performed by: FAMILY MEDICINE

## 2021-06-10 PROCEDURE — 80053 COMPREHEN METABOLIC PANEL: CPT | Performed by: FAMILY MEDICINE

## 2021-06-10 PROCEDURE — 1126F AMNT PAIN NOTED NONE PRSNT: CPT | Mod: S$GLB,,, | Performed by: FAMILY MEDICINE

## 2021-06-10 PROCEDURE — 83036 HEMOGLOBIN GLYCOSYLATED A1C: CPT | Performed by: FAMILY MEDICINE

## 2021-06-10 PROCEDURE — 85652 RBC SED RATE AUTOMATED: CPT | Performed by: FAMILY MEDICINE

## 2021-06-10 PROCEDURE — 80061 LIPID PANEL: CPT | Performed by: FAMILY MEDICINE

## 2021-06-10 PROCEDURE — 99999 PR PBB SHADOW E&M-EST. PATIENT-LVL IV: ICD-10-PCS | Mod: PBBFAC,,, | Performed by: FAMILY MEDICINE

## 2021-06-10 PROCEDURE — 99396 PREV VISIT EST AGE 40-64: CPT | Mod: S$GLB,,, | Performed by: FAMILY MEDICINE

## 2021-06-10 PROCEDURE — 99396 PR PREVENTIVE VISIT,EST,40-64: ICD-10-PCS | Mod: S$GLB,,, | Performed by: FAMILY MEDICINE

## 2021-06-10 PROCEDURE — 83540 ASSAY OF IRON: CPT | Performed by: FAMILY MEDICINE

## 2021-06-10 PROCEDURE — 82306 VITAMIN D 25 HYDROXY: CPT | Performed by: FAMILY MEDICINE

## 2021-06-10 PROCEDURE — 82728 ASSAY OF FERRITIN: CPT | Performed by: FAMILY MEDICINE

## 2021-06-10 PROCEDURE — 1126F PR PAIN SEVERITY QUANTIFIED, NO PAIN PRESENT: ICD-10-PCS | Mod: S$GLB,,, | Performed by: FAMILY MEDICINE

## 2021-06-10 PROCEDURE — 86140 C-REACTIVE PROTEIN: CPT | Performed by: FAMILY MEDICINE

## 2021-06-10 PROCEDURE — 99999 PR PBB SHADOW E&M-EST. PATIENT-LVL IV: CPT | Mod: PBBFAC,,, | Performed by: FAMILY MEDICINE

## 2021-06-10 RX ORDER — ATORVASTATIN CALCIUM 10 MG/1
10 TABLET, FILM COATED ORAL NIGHTLY
Qty: 90 TABLET | Refills: 3 | Status: SHIPPED | OUTPATIENT
Start: 2021-06-10 | End: 2022-03-22 | Stop reason: SDUPTHER

## 2021-06-10 RX ORDER — PANTOPRAZOLE SODIUM 40 MG/1
40 TABLET, DELAYED RELEASE ORAL
Qty: 90 TABLET | Refills: 3 | Status: ON HOLD | OUTPATIENT
Start: 2021-06-10 | End: 2021-08-04 | Stop reason: SDUPTHER

## 2021-06-25 ENCOUNTER — TELEPHONE (OUTPATIENT)
Dept: FAMILY MEDICINE | Facility: CLINIC | Age: 66
End: 2021-06-25

## 2021-06-25 DIAGNOSIS — Z12.11 COLON CANCER SCREENING: ICD-10-CM

## 2021-06-25 DIAGNOSIS — K21.9 GERD WITHOUT ESOPHAGITIS: Primary | ICD-10-CM

## 2021-06-29 ENCOUNTER — TELEPHONE (OUTPATIENT)
Dept: ADMINISTRATIVE | Facility: OTHER | Age: 66
End: 2021-06-29

## 2021-07-12 ENCOUNTER — PATIENT OUTREACH (OUTPATIENT)
Dept: ADMINISTRATIVE | Facility: OTHER | Age: 66
End: 2021-07-12

## 2021-07-13 ENCOUNTER — OFFICE VISIT (OUTPATIENT)
Dept: GASTROENTEROLOGY | Facility: CLINIC | Age: 66
End: 2021-07-13
Payer: COMMERCIAL

## 2021-07-13 VITALS — HEIGHT: 67 IN | WEIGHT: 175.69 LBS | BODY MASS INDEX: 27.57 KG/M2

## 2021-07-13 DIAGNOSIS — K21.9 GERD WITHOUT ESOPHAGITIS: Primary | ICD-10-CM

## 2021-07-13 DIAGNOSIS — Z12.11 COLON CANCER SCREENING: ICD-10-CM

## 2021-07-13 DIAGNOSIS — K92.1 BLOOD IN STOOL: ICD-10-CM

## 2021-07-13 PROCEDURE — 99204 PR OFFICE/OUTPT VISIT, NEW, LEVL IV, 45-59 MIN: ICD-10-PCS | Mod: S$GLB,,, | Performed by: INTERNAL MEDICINE

## 2021-07-13 PROCEDURE — 3008F PR BODY MASS INDEX (BMI) DOCUMENTED: ICD-10-PCS | Mod: CPTII,S$GLB,, | Performed by: INTERNAL MEDICINE

## 2021-07-13 PROCEDURE — 99999 PR PBB SHADOW E&M-EST. PATIENT-LVL III: ICD-10-PCS | Mod: PBBFAC,,, | Performed by: INTERNAL MEDICINE

## 2021-07-13 PROCEDURE — 99999 PR PBB SHADOW E&M-EST. PATIENT-LVL III: CPT | Mod: PBBFAC,,, | Performed by: INTERNAL MEDICINE

## 2021-07-13 PROCEDURE — 1126F AMNT PAIN NOTED NONE PRSNT: CPT | Mod: S$GLB,,, | Performed by: INTERNAL MEDICINE

## 2021-07-13 PROCEDURE — 99204 OFFICE O/P NEW MOD 45 MIN: CPT | Mod: S$GLB,,, | Performed by: INTERNAL MEDICINE

## 2021-07-13 PROCEDURE — 1126F PR PAIN SEVERITY QUANTIFIED, NO PAIN PRESENT: ICD-10-PCS | Mod: S$GLB,,, | Performed by: INTERNAL MEDICINE

## 2021-07-13 PROCEDURE — 3008F BODY MASS INDEX DOCD: CPT | Mod: CPTII,S$GLB,, | Performed by: INTERNAL MEDICINE

## 2021-07-13 RX ORDER — SODIUM, POTASSIUM,MAG SULFATES 17.5-3.13G
1 SOLUTION, RECONSTITUTED, ORAL ORAL DAILY
Qty: 1 KIT | Refills: 0 | Status: SHIPPED | OUTPATIENT
Start: 2021-07-13 | End: 2021-07-15

## 2021-07-29 ENCOUNTER — TELEPHONE (OUTPATIENT)
Dept: GASTROENTEROLOGY | Facility: CLINIC | Age: 66
End: 2021-07-29

## 2021-07-30 ENCOUNTER — TELEPHONE (OUTPATIENT)
Dept: GASTROENTEROLOGY | Facility: CLINIC | Age: 66
End: 2021-07-30

## 2021-07-30 DIAGNOSIS — Z01.812 PRE-PROCEDURE LAB EXAM: ICD-10-CM

## 2021-08-02 ENCOUNTER — LAB VISIT (OUTPATIENT)
Dept: FAMILY MEDICINE | Facility: CLINIC | Age: 66
End: 2021-08-02
Payer: COMMERCIAL

## 2021-08-02 ENCOUNTER — TELEPHONE (OUTPATIENT)
Dept: ENDOSCOPY | Facility: HOSPITAL | Age: 66
End: 2021-08-02

## 2021-08-02 DIAGNOSIS — Z01.812 PRE-PROCEDURE LAB EXAM: ICD-10-CM

## 2021-08-02 PROCEDURE — U0005 INFEC AGEN DETEC AMPLI PROBE: HCPCS | Performed by: INTERNAL MEDICINE

## 2021-08-02 PROCEDURE — U0003 INFECTIOUS AGENT DETECTION BY NUCLEIC ACID (DNA OR RNA); SEVERE ACUTE RESPIRATORY SYNDROME CORONAVIRUS 2 (SARS-COV-2) (CORONAVIRUS DISEASE [COVID-19]), AMPLIFIED PROBE TECHNIQUE, MAKING USE OF HIGH THROUGHPUT TECHNOLOGIES AS DESCRIBED BY CMS-2020-01-R: HCPCS | Performed by: INTERNAL MEDICINE

## 2021-08-03 LAB
SARS-COV-2 RNA RESP QL NAA+PROBE: NOT DETECTED
SARS-COV-2- CYCLE NUMBER: -1

## 2021-08-04 ENCOUNTER — HOSPITAL ENCOUNTER (OUTPATIENT)
Facility: HOSPITAL | Age: 66
Discharge: HOME | End: 2021-08-04
Attending: INTERNAL MEDICINE | Admitting: INTERNAL MEDICINE
Payer: COMMERCIAL

## 2021-08-04 ENCOUNTER — ANESTHESIA EVENT (OUTPATIENT)
Dept: ENDOSCOPY | Facility: HOSPITAL | Age: 66
End: 2021-08-04
Payer: COMMERCIAL

## 2021-08-04 ENCOUNTER — ANESTHESIA (OUTPATIENT)
Dept: ENDOSCOPY | Facility: HOSPITAL | Age: 66
End: 2021-08-04
Payer: COMMERCIAL

## 2021-08-04 VITALS
HEART RATE: 55 BPM | RESPIRATION RATE: 16 BRPM | OXYGEN SATURATION: 99 % | DIASTOLIC BLOOD PRESSURE: 77 MMHG | BODY MASS INDEX: 27.47 KG/M2 | HEIGHT: 67 IN | TEMPERATURE: 98 F | WEIGHT: 175 LBS | SYSTOLIC BLOOD PRESSURE: 114 MMHG

## 2021-08-04 DIAGNOSIS — K21.9 GERD WITHOUT ESOPHAGITIS: ICD-10-CM

## 2021-08-04 DIAGNOSIS — Z12.11 SCREEN FOR COLON CANCER: ICD-10-CM

## 2021-08-04 PROBLEM — K22.2 LOWER ESOPHAGEAL RING (SCHATZKI): Status: ACTIVE | Noted: 2021-08-04

## 2021-08-04 LAB — KOH PREP SPEC: NORMAL

## 2021-08-04 PROCEDURE — 43239 EGD BIOPSY SINGLE/MULTIPLE: CPT | Performed by: INTERNAL MEDICINE

## 2021-08-04 PROCEDURE — 25000003 PHARM REV CODE 250: Performed by: NURSE ANESTHETIST, CERTIFIED REGISTERED

## 2021-08-04 PROCEDURE — 45380 PR COLONOSCOPY,BIOPSY: ICD-10-PCS | Mod: 33,,, | Performed by: INTERNAL MEDICINE

## 2021-08-04 PROCEDURE — 43249 PR EGD, FLEX, W/BALL DILATION, < 30MM: ICD-10-PCS | Mod: 51,,, | Performed by: INTERNAL MEDICINE

## 2021-08-04 PROCEDURE — C1726 CATH, BAL DIL, NON-VASCULAR: HCPCS | Performed by: INTERNAL MEDICINE

## 2021-08-04 PROCEDURE — 37000009 HC ANESTHESIA EA ADD 15 MINS: Performed by: INTERNAL MEDICINE

## 2021-08-04 PROCEDURE — 43249 ESOPH EGD DILATION <30 MM: CPT | Mod: 51,,, | Performed by: INTERNAL MEDICINE

## 2021-08-04 PROCEDURE — 88305 TISSUE EXAM BY PATHOLOGIST: ICD-10-PCS | Mod: 26,,, | Performed by: STUDENT IN AN ORGANIZED HEALTH CARE EDUCATION/TRAINING PROGRAM

## 2021-08-04 PROCEDURE — 88305 TISSUE EXAM BY PATHOLOGIST: CPT | Performed by: STUDENT IN AN ORGANIZED HEALTH CARE EDUCATION/TRAINING PROGRAM

## 2021-08-04 PROCEDURE — 88342 CHG IMMUNOCYTOCHEMISTRY: ICD-10-PCS | Mod: 26,,, | Performed by: STUDENT IN AN ORGANIZED HEALTH CARE EDUCATION/TRAINING PROGRAM

## 2021-08-04 PROCEDURE — 27201012 HC FORCEPS, HOT/COLD, DISP: Performed by: INTERNAL MEDICINE

## 2021-08-04 PROCEDURE — 87210 SMEAR WET MOUNT SALINE/INK: CPT | Performed by: INTERNAL MEDICINE

## 2021-08-04 PROCEDURE — 37000008 HC ANESTHESIA 1ST 15 MINUTES: Performed by: INTERNAL MEDICINE

## 2021-08-04 PROCEDURE — 88305 TISSUE EXAM BY PATHOLOGIST: CPT | Mod: 26,,, | Performed by: STUDENT IN AN ORGANIZED HEALTH CARE EDUCATION/TRAINING PROGRAM

## 2021-08-04 PROCEDURE — 25000003 PHARM REV CODE 250: Performed by: INTERNAL MEDICINE

## 2021-08-04 PROCEDURE — 45380 COLONOSCOPY AND BIOPSY: CPT | Mod: 33,,, | Performed by: INTERNAL MEDICINE

## 2021-08-04 PROCEDURE — 88342 IMHCHEM/IMCYTCHM 1ST ANTB: CPT | Mod: 26,,, | Performed by: STUDENT IN AN ORGANIZED HEALTH CARE EDUCATION/TRAINING PROGRAM

## 2021-08-04 PROCEDURE — 27200946 HC BRUSH, CYTOLOGY: Performed by: INTERNAL MEDICINE

## 2021-08-04 PROCEDURE — 45380 COLONOSCOPY AND BIOPSY: CPT | Performed by: INTERNAL MEDICINE

## 2021-08-04 PROCEDURE — 63600175 PHARM REV CODE 636 W HCPCS: Performed by: NURSE ANESTHETIST, CERTIFIED REGISTERED

## 2021-08-04 PROCEDURE — 88342 IMHCHEM/IMCYTCHM 1ST ANTB: CPT | Performed by: STUDENT IN AN ORGANIZED HEALTH CARE EDUCATION/TRAINING PROGRAM

## 2021-08-04 PROCEDURE — 43249 ESOPH EGD DILATION <30 MM: CPT | Performed by: INTERNAL MEDICINE

## 2021-08-04 RX ORDER — PHENYLEPHRINE HYDROCHLORIDE 10 MG/ML
INJECTION INTRAVENOUS
Status: DISCONTINUED | OUTPATIENT
Start: 2021-08-04 | End: 2021-08-04

## 2021-08-04 RX ORDER — SODIUM CHLORIDE 9 MG/ML
INJECTION, SOLUTION INTRAVENOUS CONTINUOUS
Status: DISCONTINUED | OUTPATIENT
Start: 2021-08-04 | End: 2021-08-04 | Stop reason: HOSPADM

## 2021-08-04 RX ORDER — PROPOFOL 10 MG/ML
VIAL (ML) INTRAVENOUS
Status: DISCONTINUED | OUTPATIENT
Start: 2021-08-04 | End: 2021-08-04

## 2021-08-04 RX ORDER — PROPOFOL 10 MG/ML
VIAL (ML) INTRAVENOUS CONTINUOUS PRN
Status: DISCONTINUED | OUTPATIENT
Start: 2021-08-04 | End: 2021-08-04

## 2021-08-04 RX ORDER — PANTOPRAZOLE SODIUM 40 MG/1
40 TABLET, DELAYED RELEASE ORAL 2 TIMES DAILY
Qty: 60 TABLET | Refills: 6 | Status: SHIPPED | OUTPATIENT
Start: 2021-08-04 | End: 2022-03-22 | Stop reason: SDUPTHER

## 2021-08-04 RX ORDER — LIDOCAINE HYDROCHLORIDE 20 MG/ML
INJECTION INTRAVENOUS
Status: DISCONTINUED | OUTPATIENT
Start: 2021-08-04 | End: 2021-08-04

## 2021-08-04 RX ORDER — SODIUM CHLORIDE 0.9 % (FLUSH) 0.9 %
10 SYRINGE (ML) INJECTION
Status: DISCONTINUED | OUTPATIENT
Start: 2021-08-04 | End: 2021-08-04 | Stop reason: HOSPADM

## 2021-08-04 RX ADMIN — PROPOFOL 150 MCG/KG/MIN: 10 INJECTION, EMULSION INTRAVENOUS at 12:08

## 2021-08-04 RX ADMIN — LIDOCAINE HYDROCHLORIDE 100 MG: 20 INJECTION, SOLUTION INTRAVENOUS at 12:08

## 2021-08-04 RX ADMIN — PHENYLEPHRINE HYDROCHLORIDE 100 MCG: 10 INJECTION INTRAVENOUS at 12:08

## 2021-08-04 RX ADMIN — PROPOFOL 80 MG: 10 INJECTION, EMULSION INTRAVENOUS at 12:08

## 2021-08-04 RX ADMIN — SODIUM CHLORIDE: 0.9 INJECTION, SOLUTION INTRAVENOUS at 11:08

## 2021-08-05 DIAGNOSIS — B37.81 CANDIDA ESOPHAGITIS: Primary | ICD-10-CM

## 2021-08-05 RX ORDER — FLUCONAZOLE 200 MG/1
200 TABLET ORAL DAILY
Qty: 15 TABLET | Refills: 0 | Status: SHIPPED | OUTPATIENT
Start: 2021-08-05 | End: 2021-08-19

## 2021-08-06 ENCOUNTER — TELEPHONE (OUTPATIENT)
Dept: GASTROENTEROLOGY | Facility: CLINIC | Age: 66
End: 2021-08-06

## 2021-08-10 ENCOUNTER — TELEPHONE (OUTPATIENT)
Dept: GASTROENTEROLOGY | Facility: CLINIC | Age: 66
End: 2021-08-10

## 2021-08-10 DIAGNOSIS — K29.50 CHRONIC HELICOBACTER PYLORI GASTRITIS: Primary | ICD-10-CM

## 2021-08-10 DIAGNOSIS — B96.81 CHRONIC HELICOBACTER PYLORI GASTRITIS: Primary | ICD-10-CM

## 2021-08-10 LAB
FINAL PATHOLOGIC DIAGNOSIS: NORMAL
GROSS: NORMAL
Lab: NORMAL
MICROSCOPIC EXAM: NORMAL

## 2021-08-10 RX ORDER — CLARITHROMYCIN 500 MG/1
500 TABLET, FILM COATED ORAL 2 TIMES DAILY
Qty: 28 TABLET | Refills: 0 | Status: SHIPPED | OUTPATIENT
Start: 2021-08-10 | End: 2021-08-24

## 2021-08-10 RX ORDER — AMOXICILLIN 500 MG/1
1000 CAPSULE ORAL 2 TIMES DAILY
Qty: 56 CAPSULE | Refills: 0 | Status: SHIPPED | OUTPATIENT
Start: 2021-08-10 | End: 2021-08-24

## 2021-08-13 ENCOUNTER — TELEPHONE (OUTPATIENT)
Dept: GASTROENTEROLOGY | Facility: CLINIC | Age: 66
End: 2021-08-13

## 2021-08-16 ENCOUNTER — TELEPHONE (OUTPATIENT)
Dept: GASTROENTEROLOGY | Facility: CLINIC | Age: 66
End: 2021-08-16

## 2021-09-24 ENCOUNTER — PATIENT OUTREACH (OUTPATIENT)
Dept: ADMINISTRATIVE | Facility: OTHER | Age: 66
End: 2021-09-24

## 2021-09-28 ENCOUNTER — OFFICE VISIT (OUTPATIENT)
Dept: GASTROENTEROLOGY | Facility: CLINIC | Age: 66
End: 2021-09-28
Payer: COMMERCIAL

## 2021-09-28 VITALS — HEIGHT: 67 IN | WEIGHT: 176.81 LBS | BODY MASS INDEX: 27.75 KG/M2

## 2021-09-28 DIAGNOSIS — K29.50 CHRONIC HELICOBACTER PYLORI GASTRITIS: Primary | ICD-10-CM

## 2021-09-28 DIAGNOSIS — B96.81 CHRONIC HELICOBACTER PYLORI GASTRITIS: Primary | ICD-10-CM

## 2021-09-28 PROCEDURE — 99214 OFFICE O/P EST MOD 30 MIN: CPT | Mod: S$GLB,,, | Performed by: INTERNAL MEDICINE

## 2021-09-28 PROCEDURE — 99999 PR PBB SHADOW E&M-EST. PATIENT-LVL III: CPT | Mod: PBBFAC,,, | Performed by: INTERNAL MEDICINE

## 2021-09-28 PROCEDURE — 3044F PR MOST RECENT HEMOGLOBIN A1C LEVEL <7.0%: ICD-10-PCS | Mod: CPTII,S$GLB,, | Performed by: INTERNAL MEDICINE

## 2021-09-28 PROCEDURE — 1159F MED LIST DOCD IN RCRD: CPT | Mod: CPTII,S$GLB,, | Performed by: INTERNAL MEDICINE

## 2021-09-28 PROCEDURE — 99214 PR OFFICE/OUTPT VISIT, EST, LEVL IV, 30-39 MIN: ICD-10-PCS | Mod: S$GLB,,, | Performed by: INTERNAL MEDICINE

## 2021-09-28 PROCEDURE — 1159F PR MEDICATION LIST DOCUMENTED IN MEDICAL RECORD: ICD-10-PCS | Mod: CPTII,S$GLB,, | Performed by: INTERNAL MEDICINE

## 2021-09-28 PROCEDURE — 3008F BODY MASS INDEX DOCD: CPT | Mod: CPTII,S$GLB,, | Performed by: INTERNAL MEDICINE

## 2021-09-28 PROCEDURE — 99999 PR PBB SHADOW E&M-EST. PATIENT-LVL III: ICD-10-PCS | Mod: PBBFAC,,, | Performed by: INTERNAL MEDICINE

## 2021-09-28 PROCEDURE — 3044F HG A1C LEVEL LT 7.0%: CPT | Mod: CPTII,S$GLB,, | Performed by: INTERNAL MEDICINE

## 2021-09-28 PROCEDURE — 3008F PR BODY MASS INDEX (BMI) DOCUMENTED: ICD-10-PCS | Mod: CPTII,S$GLB,, | Performed by: INTERNAL MEDICINE

## 2021-10-13 ENCOUNTER — LAB VISIT (OUTPATIENT)
Dept: LAB | Facility: HOSPITAL | Age: 66
End: 2021-10-13
Attending: INTERNAL MEDICINE
Payer: COMMERCIAL

## 2021-10-13 DIAGNOSIS — K29.50 CHRONIC HELICOBACTER PYLORI GASTRITIS: ICD-10-CM

## 2021-10-13 DIAGNOSIS — B96.81 CHRONIC HELICOBACTER PYLORI GASTRITIS: ICD-10-CM

## 2021-10-13 PROCEDURE — 87338 HPYLORI STOOL AG IA: CPT | Performed by: INTERNAL MEDICINE

## 2021-10-20 LAB
H PYLORI AG STL QL IA: ABNORMAL
SPECIMEN SOURCE: 1

## 2021-10-27 DIAGNOSIS — K29.50 CHRONIC HELICOBACTER PYLORI GASTRITIS: Primary | ICD-10-CM

## 2021-10-27 DIAGNOSIS — B96.81 CHRONIC HELICOBACTER PYLORI GASTRITIS: Primary | ICD-10-CM

## 2021-10-27 RX ORDER — BISMUTH SUBCITRATE POTASSIUM, METRONIDAZOLE, TETRACYCLINE HYDROCHLORIDE 140; 125; 125 MG/1; MG/1; MG/1
3 CAPSULE ORAL
Qty: 120 CAPSULE | Refills: 0 | Status: SHIPPED | OUTPATIENT
Start: 2021-10-27 | End: 2021-11-06

## 2021-11-09 ENCOUNTER — TELEPHONE (OUTPATIENT)
Dept: GASTROENTEROLOGY | Facility: CLINIC | Age: 66
End: 2021-11-09
Payer: COMMERCIAL

## 2021-11-17 ENCOUNTER — PATIENT OUTREACH (OUTPATIENT)
Dept: ADMINISTRATIVE | Facility: OTHER | Age: 66
End: 2021-11-17
Payer: COMMERCIAL

## 2021-11-18 ENCOUNTER — OFFICE VISIT (OUTPATIENT)
Dept: GASTROENTEROLOGY | Facility: CLINIC | Age: 66
End: 2021-11-18
Payer: COMMERCIAL

## 2021-11-18 VITALS — HEIGHT: 67 IN | WEIGHT: 171.06 LBS | BODY MASS INDEX: 26.85 KG/M2

## 2021-11-18 DIAGNOSIS — B96.81 CHRONIC HELICOBACTER PYLORI GASTRITIS: Primary | ICD-10-CM

## 2021-11-18 DIAGNOSIS — K29.50 CHRONIC HELICOBACTER PYLORI GASTRITIS: Primary | ICD-10-CM

## 2021-11-18 PROCEDURE — 99214 PR OFFICE/OUTPT VISIT, EST, LEVL IV, 30-39 MIN: ICD-10-PCS | Mod: S$GLB,,, | Performed by: INTERNAL MEDICINE

## 2021-11-18 PROCEDURE — 99999 PR PBB SHADOW E&M-EST. PATIENT-LVL II: ICD-10-PCS | Mod: PBBFAC,,, | Performed by: INTERNAL MEDICINE

## 2021-11-18 PROCEDURE — 3044F HG A1C LEVEL LT 7.0%: CPT | Mod: CPTII,S$GLB,, | Performed by: INTERNAL MEDICINE

## 2021-11-18 PROCEDURE — 3008F PR BODY MASS INDEX (BMI) DOCUMENTED: ICD-10-PCS | Mod: CPTII,S$GLB,, | Performed by: INTERNAL MEDICINE

## 2021-11-18 PROCEDURE — 99999 PR PBB SHADOW E&M-EST. PATIENT-LVL II: CPT | Mod: PBBFAC,,, | Performed by: INTERNAL MEDICINE

## 2021-11-18 PROCEDURE — 99214 OFFICE O/P EST MOD 30 MIN: CPT | Mod: S$GLB,,, | Performed by: INTERNAL MEDICINE

## 2021-11-18 PROCEDURE — 1159F PR MEDICATION LIST DOCUMENTED IN MEDICAL RECORD: ICD-10-PCS | Mod: CPTII,S$GLB,, | Performed by: INTERNAL MEDICINE

## 2021-11-18 PROCEDURE — 1159F MED LIST DOCD IN RCRD: CPT | Mod: CPTII,S$GLB,, | Performed by: INTERNAL MEDICINE

## 2021-11-18 PROCEDURE — 3008F BODY MASS INDEX DOCD: CPT | Mod: CPTII,S$GLB,, | Performed by: INTERNAL MEDICINE

## 2021-11-18 PROCEDURE — 3044F PR MOST RECENT HEMOGLOBIN A1C LEVEL <7.0%: ICD-10-PCS | Mod: CPTII,S$GLB,, | Performed by: INTERNAL MEDICINE

## 2021-11-24 ENCOUNTER — TELEPHONE (OUTPATIENT)
Dept: GASTROENTEROLOGY | Facility: CLINIC | Age: 66
End: 2021-11-24
Payer: COMMERCIAL

## 2021-12-09 ENCOUNTER — TELEPHONE (OUTPATIENT)
Dept: GASTROENTEROLOGY | Facility: CLINIC | Age: 66
End: 2021-12-09
Payer: COMMERCIAL

## 2021-12-10 ENCOUNTER — TELEPHONE (OUTPATIENT)
Dept: GASTROENTEROLOGY | Facility: CLINIC | Age: 66
End: 2021-12-10
Payer: COMMERCIAL

## 2021-12-10 DIAGNOSIS — B96.81 CHRONIC HELICOBACTER PYLORI GASTRITIS: Primary | ICD-10-CM

## 2021-12-10 DIAGNOSIS — K29.50 CHRONIC HELICOBACTER PYLORI GASTRITIS: Primary | ICD-10-CM

## 2021-12-14 ENCOUNTER — HOSPITAL ENCOUNTER (EMERGENCY)
Facility: HOSPITAL | Age: 66
Discharge: HOME | End: 2021-12-14
Attending: EMERGENCY MEDICINE
Payer: COMMERCIAL

## 2021-12-14 VITALS
HEIGHT: 67 IN | TEMPERATURE: 98 F | WEIGHT: 175 LBS | DIASTOLIC BLOOD PRESSURE: 76 MMHG | OXYGEN SATURATION: 100 % | SYSTOLIC BLOOD PRESSURE: 132 MMHG | BODY MASS INDEX: 27.47 KG/M2 | RESPIRATION RATE: 17 BRPM | HEART RATE: 72 BPM

## 2021-12-14 DIAGNOSIS — S61.011A LACERATION OF RIGHT THUMB WITHOUT FOREIGN BODY WITHOUT DAMAGE TO NAIL, INITIAL ENCOUNTER: Primary | ICD-10-CM

## 2021-12-14 PROCEDURE — 25000003 PHARM REV CODE 250: Performed by: EMERGENCY MEDICINE

## 2021-12-14 PROCEDURE — 12002 RPR S/N/AX/GEN/TRNK2.6-7.5CM: CPT

## 2021-12-14 PROCEDURE — 99283 EMERGENCY DEPT VISIT LOW MDM: CPT | Mod: 25

## 2021-12-14 RX ORDER — LIDOCAINE HYDROCHLORIDE 10 MG/ML
5 INJECTION, SOLUTION EPIDURAL; INFILTRATION; INTRACAUDAL; PERINEURAL
Status: COMPLETED | OUTPATIENT
Start: 2021-12-14 | End: 2021-12-14

## 2021-12-14 RX ADMIN — LIDOCAINE HYDROCHLORIDE 50 MG: 10 INJECTION, SOLUTION EPIDURAL; INFILTRATION; INTRACAUDAL; PERINEURAL at 10:12

## 2021-12-14 RX ADMIN — BACITRACIN, NEOMYCIN, POLYMYXIN B 1 EACH: 400; 3.5; 5 OINTMENT TOPICAL at 10:12

## 2021-12-21 ENCOUNTER — LAB VISIT (OUTPATIENT)
Dept: LAB | Facility: HOSPITAL | Age: 66
End: 2021-12-21
Attending: INTERNAL MEDICINE
Payer: COMMERCIAL

## 2021-12-21 DIAGNOSIS — B96.81 CHRONIC HELICOBACTER PYLORI GASTRITIS: ICD-10-CM

## 2021-12-21 DIAGNOSIS — K29.50 CHRONIC HELICOBACTER PYLORI GASTRITIS: ICD-10-CM

## 2021-12-21 PROCEDURE — 87338 HPYLORI STOOL AG IA: CPT | Performed by: INTERNAL MEDICINE

## 2021-12-24 ENCOUNTER — OFFICE VISIT (OUTPATIENT)
Dept: URGENT CARE | Facility: CLINIC | Age: 66
End: 2021-12-24
Payer: COMMERCIAL

## 2021-12-24 VITALS
RESPIRATION RATE: 18 BRPM | HEART RATE: 66 BPM | BODY MASS INDEX: 27 KG/M2 | HEIGHT: 67 IN | SYSTOLIC BLOOD PRESSURE: 113 MMHG | DIASTOLIC BLOOD PRESSURE: 71 MMHG | OXYGEN SATURATION: 96 % | WEIGHT: 172 LBS | TEMPERATURE: 98 F

## 2021-12-24 DIAGNOSIS — Z48.02 ENCOUNTER FOR REMOVAL OF SUTURES: Primary | ICD-10-CM

## 2021-12-24 PROCEDURE — 1160F RVW MEDS BY RX/DR IN RCRD: CPT | Mod: CPTII,S$GLB,,

## 2021-12-24 PROCEDURE — 3044F PR MOST RECENT HEMOGLOBIN A1C LEVEL <7.0%: ICD-10-PCS | Mod: CPTII,S$GLB,,

## 2021-12-24 PROCEDURE — 99024 SUTURE REMOVAL: ICD-10-PCS | Mod: S$GLB,,,

## 2021-12-24 PROCEDURE — 3044F HG A1C LEVEL LT 7.0%: CPT | Mod: CPTII,S$GLB,,

## 2021-12-24 PROCEDURE — 99024 POSTOP FOLLOW-UP VISIT: CPT | Mod: S$GLB,,,

## 2021-12-24 PROCEDURE — 99499 NO LOS: ICD-10-PCS | Mod: S$GLB,,,

## 2021-12-24 PROCEDURE — 99499 UNLISTED E&M SERVICE: CPT | Mod: S$GLB,,,

## 2021-12-24 PROCEDURE — 3074F SYST BP LT 130 MM HG: CPT | Mod: CPTII,S$GLB,,

## 2021-12-24 PROCEDURE — 3078F DIAST BP <80 MM HG: CPT | Mod: CPTII,S$GLB,,

## 2021-12-24 PROCEDURE — 3008F PR BODY MASS INDEX (BMI) DOCUMENTED: ICD-10-PCS | Mod: CPTII,S$GLB,,

## 2021-12-24 PROCEDURE — 1160F PR REVIEW ALL MEDS BY PRESCRIBER/CLIN PHARMACIST DOCUMENTED: ICD-10-PCS | Mod: CPTII,S$GLB,,

## 2021-12-24 PROCEDURE — 3078F PR MOST RECENT DIASTOLIC BLOOD PRESSURE < 80 MM HG: ICD-10-PCS | Mod: CPTII,S$GLB,,

## 2021-12-24 PROCEDURE — 1159F MED LIST DOCD IN RCRD: CPT | Mod: CPTII,S$GLB,,

## 2021-12-24 PROCEDURE — 1159F PR MEDICATION LIST DOCUMENTED IN MEDICAL RECORD: ICD-10-PCS | Mod: CPTII,S$GLB,,

## 2021-12-24 PROCEDURE — 3074F PR MOST RECENT SYSTOLIC BLOOD PRESSURE < 130 MM HG: ICD-10-PCS | Mod: CPTII,S$GLB,,

## 2021-12-24 PROCEDURE — 3008F BODY MASS INDEX DOCD: CPT | Mod: CPTII,S$GLB,,

## 2021-12-27 ENCOUNTER — OFFICE VISIT (OUTPATIENT)
Dept: FAMILY MEDICINE | Facility: CLINIC | Age: 66
End: 2021-12-27
Payer: COMMERCIAL

## 2021-12-27 VITALS
TEMPERATURE: 98 F | OXYGEN SATURATION: 96 % | DIASTOLIC BLOOD PRESSURE: 72 MMHG | BODY MASS INDEX: 27.3 KG/M2 | HEIGHT: 67 IN | HEART RATE: 85 BPM | SYSTOLIC BLOOD PRESSURE: 114 MMHG | WEIGHT: 173.94 LBS

## 2021-12-27 DIAGNOSIS — S61.011D THUMB LACERATION, RIGHT, SUBSEQUENT ENCOUNTER: Primary | ICD-10-CM

## 2021-12-27 PROCEDURE — 3044F HG A1C LEVEL LT 7.0%: CPT | Mod: CPTII,S$GLB,, | Performed by: INTERNAL MEDICINE

## 2021-12-27 PROCEDURE — 1159F MED LIST DOCD IN RCRD: CPT | Mod: CPTII,S$GLB,, | Performed by: INTERNAL MEDICINE

## 2021-12-27 PROCEDURE — 99213 OFFICE O/P EST LOW 20 MIN: CPT | Mod: S$GLB,,, | Performed by: INTERNAL MEDICINE

## 2021-12-27 PROCEDURE — 3074F SYST BP LT 130 MM HG: CPT | Mod: CPTII,S$GLB,, | Performed by: INTERNAL MEDICINE

## 2021-12-27 PROCEDURE — 99213 PR OFFICE/OUTPT VISIT, EST, LEVL III, 20-29 MIN: ICD-10-PCS | Mod: S$GLB,,, | Performed by: INTERNAL MEDICINE

## 2021-12-27 PROCEDURE — 1159F PR MEDICATION LIST DOCUMENTED IN MEDICAL RECORD: ICD-10-PCS | Mod: CPTII,S$GLB,, | Performed by: INTERNAL MEDICINE

## 2021-12-27 PROCEDURE — 3074F PR MOST RECENT SYSTOLIC BLOOD PRESSURE < 130 MM HG: ICD-10-PCS | Mod: CPTII,S$GLB,, | Performed by: INTERNAL MEDICINE

## 2021-12-27 PROCEDURE — 3008F PR BODY MASS INDEX (BMI) DOCUMENTED: ICD-10-PCS | Mod: CPTII,S$GLB,, | Performed by: INTERNAL MEDICINE

## 2021-12-27 PROCEDURE — 3008F BODY MASS INDEX DOCD: CPT | Mod: CPTII,S$GLB,, | Performed by: INTERNAL MEDICINE

## 2021-12-27 PROCEDURE — 3078F DIAST BP <80 MM HG: CPT | Mod: CPTII,S$GLB,, | Performed by: INTERNAL MEDICINE

## 2021-12-27 PROCEDURE — 1160F PR REVIEW ALL MEDS BY PRESCRIBER/CLIN PHARMACIST DOCUMENTED: ICD-10-PCS | Mod: CPTII,S$GLB,, | Performed by: INTERNAL MEDICINE

## 2021-12-27 PROCEDURE — 99999 PR PBB SHADOW E&M-EST. PATIENT-LVL IV: ICD-10-PCS | Mod: PBBFAC,,, | Performed by: INTERNAL MEDICINE

## 2021-12-27 PROCEDURE — 3078F PR MOST RECENT DIASTOLIC BLOOD PRESSURE < 80 MM HG: ICD-10-PCS | Mod: CPTII,S$GLB,, | Performed by: INTERNAL MEDICINE

## 2021-12-27 PROCEDURE — 1160F RVW MEDS BY RX/DR IN RCRD: CPT | Mod: CPTII,S$GLB,, | Performed by: INTERNAL MEDICINE

## 2021-12-27 PROCEDURE — 99999 PR PBB SHADOW E&M-EST. PATIENT-LVL IV: CPT | Mod: PBBFAC,,, | Performed by: INTERNAL MEDICINE

## 2021-12-27 PROCEDURE — 3044F PR MOST RECENT HEMOGLOBIN A1C LEVEL <7.0%: ICD-10-PCS | Mod: CPTII,S$GLB,, | Performed by: INTERNAL MEDICINE

## 2021-12-27 RX ORDER — DOXYCYCLINE 100 MG/1
100 CAPSULE ORAL 2 TIMES DAILY
Qty: 14 CAPSULE | Refills: 0 | Status: SHIPPED | OUTPATIENT
Start: 2021-12-27 | End: 2022-01-03

## 2021-12-27 RX ORDER — NAPROXEN 500 MG/1
500 TABLET ORAL 2 TIMES DAILY WITH MEALS
Qty: 14 TABLET | Refills: 0 | Status: SHIPPED | OUTPATIENT
Start: 2021-12-27 | End: 2022-01-03

## 2021-12-29 LAB
H PYLORI AG STL QL IA: NORMAL
SPECIMEN SOURCE: NORMAL

## 2022-01-20 ENCOUNTER — TELEPHONE (OUTPATIENT)
Dept: ORTHOPEDICS | Facility: CLINIC | Age: 67
End: 2022-01-20
Payer: COMMERCIAL

## 2022-01-20 ENCOUNTER — PATIENT OUTREACH (OUTPATIENT)
Dept: ADMINISTRATIVE | Facility: OTHER | Age: 67
End: 2022-01-20
Payer: COMMERCIAL

## 2022-01-20 DIAGNOSIS — S69.92XA INJURY OF FINGER OF LEFT HAND, INITIAL ENCOUNTER: Primary | ICD-10-CM

## 2022-01-20 NOTE — PROGRESS NOTES
"Subjective:      Patient ID: Alessandro Cerna is a 66 y.o. male.    Chief Complaint: Laceration (Right thumb)      HPI: Alessandro Cerna is here for initial visit and ED follow-up for laceration to the right thumb with sharp metal, now 5 weeks ago. Sutures were removed at , he then saw his PCP who prescribed doxycyline and naprosyn. Today, reports compliance with medication but has persistent swelling. He denies decreased ROM, decreased sensation, or pain. He finds fine manipulation and writing difficult.    Past Medical History:   Diagnosis Date    Chronic seasonal allergic rhinitis 11/16/2017    GERD (gastroesophageal reflux disease)     TB (pulmonary tuberculosis) 1982    Treated [facility] 9 mo therapy.       Current Outpatient Medications:     atorvastatin (LIPITOR) 10 MG tablet, Take 1 tablet (10 mg total) by mouth every evening., Disp: 90 tablet, Rfl: 3    ergocalciferol (ERGOCALCIFEROL) 50,000 unit Cap, Take 1 capsule (50,000 Units total) by mouth every 7 days. Then start daily OTC replacement after this Rx is complete, Disp: 12 capsule, Rfl: 0    pantoprazole (PROTONIX) 40 MG tablet, Take 1 tablet (40 mg total) by mouth 2 (two) times daily., Disp: 60 tablet, Rfl: 6    tadalafiL (CIALIS) 20 MG Tab, TAKE 1 TABLET (20 MG TOTAL) BY MOUTH DAILY TO EVERY OTHER DAY AS NEEDED, Disp: 10 tablet, Rfl: 0    etodolac (LODINE) 500 MG tablet, Take 1 tablet (500 mg total) by mouth 2 (two) times daily., Disp: 60 tablet, Rfl: 0  Review of patient's allergies indicates:  No Known Allergies    Ht 5' 7" (1.702 m)   Wt 78.5 kg (173 lb)   BMI 27.10 kg/m²     Review of Systems   Constitutional: Negative for chills and fever.   Cardiovascular: Negative for chest pain and palpitations.   Respiratory: Negative for shortness of breath and wheezing.    Skin: Negative for poor wound healing and rash.   Musculoskeletal: Positive for joint swelling.   Gastrointestinal: Negative for nausea and vomiting.   Genitourinary: " Negative for dysuria and hematuria.   Neurological: Negative for seizures and tremors.   Psychiatric/Behavioral: Negative for altered mental status.   Allergic/Immunologic: Negative for environmental allergies and persistent infections.         Objective:    Ortho Exam       Right UE: Skin intact, no cellulitis or erythema. Swelling moderate, pulp of thumb. TTP none. ROM full at IP joint in isolation. Sensation intact. Tinels negative. Pulses present. Cap refill brisk.   GEN: Well developed, well nourished male. AAOX3. No acute distress.   Normocephalic, atraumatic.   DEANNA  Breathing unlabored.  Mood and affect appropriate.     Assessment:     Imaging:  I have personally reviewed and interpreted the radiographs. Xray of the right hand shows no retained metallic foreign body.          1. Thumb laceration, right, subsequent encounter          Plan:       He does have significant swelling of the pulp of the thumb, but it is not painful or appear to be a felon/ abscess.   Flexor tendon is intact  We discussed his treatment options in detail including: watchful waiting while using antiinflammatories and HEP or MRI for further imaging  We have decided on close follow-up. If this worsens or does not improve will get MRI  Pt educated on RTC signs/ sx  Topical Pennsaid    Orders Placed This Encounter    etodolac (LODINE) 500 MG tablet     Follow up in about 10 days (around 1/31/2022).

## 2022-01-20 NOTE — PROGRESS NOTES
Health Maintenance Due   Topic Date Due    Shingles Vaccine (1 of 2) Never done    TETANUS VACCINE  10/25/2015    COVID-19 Vaccine (3 - Booster for Moderna series) 10/13/2021     Updates were requested from care everywhere.  Chart was reviewed for overdue [facility] Encounters ([facility]) topics (CRS, Breast Cancer Screening, Eye exam)  Health Maintenance has been updated.  LINKS immunization registry triggered.  Immunizations were reconciled.

## 2022-01-21 ENCOUNTER — OFFICE VISIT (OUTPATIENT)
Dept: ORTHOPEDICS | Facility: CLINIC | Age: 67
End: 2022-01-21
Payer: COMMERCIAL

## 2022-01-21 ENCOUNTER — HOSPITAL ENCOUNTER (OUTPATIENT)
Dept: RADIOLOGY | Facility: HOSPITAL | Age: 67
Discharge: HOME | End: 2022-01-21
Attending: ORTHOPAEDIC SURGERY
Payer: COMMERCIAL

## 2022-01-21 VITALS — WEIGHT: 173 LBS | HEIGHT: 67 IN | BODY MASS INDEX: 27.15 KG/M2

## 2022-01-21 DIAGNOSIS — S69.92XA INJURY OF FINGER OF LEFT HAND, INITIAL ENCOUNTER: ICD-10-CM

## 2022-01-21 DIAGNOSIS — S61.011D THUMB LACERATION, RIGHT, SUBSEQUENT ENCOUNTER: ICD-10-CM

## 2022-01-21 PROCEDURE — 1160F PR REVIEW ALL MEDS BY PRESCRIBER/CLIN PHARMACIST DOCUMENTED: ICD-10-PCS | Mod: CPTII,S$GLB,, | Performed by: ORTHOPAEDIC SURGERY

## 2022-01-21 PROCEDURE — 73130 X-RAY EXAM OF HAND: CPT | Mod: 26,LT,, | Performed by: INTERNAL MEDICINE

## 2022-01-21 PROCEDURE — 73130 XR HAND COMPLETE 3 VIEW LEFT: ICD-10-PCS | Mod: 26,LT,, | Performed by: INTERNAL MEDICINE

## 2022-01-21 PROCEDURE — 1160F RVW MEDS BY RX/DR IN RCRD: CPT | Mod: CPTII,S$GLB,, | Performed by: ORTHOPAEDIC SURGERY

## 2022-01-21 PROCEDURE — 3008F PR BODY MASS INDEX (BMI) DOCUMENTED: ICD-10-PCS | Mod: CPTII,S$GLB,, | Performed by: ORTHOPAEDIC SURGERY

## 2022-01-21 PROCEDURE — 3008F BODY MASS INDEX DOCD: CPT | Mod: CPTII,S$GLB,, | Performed by: ORTHOPAEDIC SURGERY

## 2022-01-21 PROCEDURE — 99203 PR OFFICE/OUTPT VISIT, NEW, LEVL III, 30-44 MIN: ICD-10-PCS | Mod: S$GLB,,, | Performed by: ORTHOPAEDIC SURGERY

## 2022-01-21 PROCEDURE — 99999 PR PBB SHADOW E&M-EST. PATIENT-LVL III: CPT | Mod: PBBFAC,,, | Performed by: ORTHOPAEDIC SURGERY

## 2022-01-21 PROCEDURE — 1159F MED LIST DOCD IN RCRD: CPT | Mod: CPTII,S$GLB,, | Performed by: ORTHOPAEDIC SURGERY

## 2022-01-21 PROCEDURE — 73130 X-RAY EXAM OF HAND: CPT | Mod: TC,FY,LT

## 2022-01-21 PROCEDURE — 1159F PR MEDICATION LIST DOCUMENTED IN MEDICAL RECORD: ICD-10-PCS | Mod: CPTII,S$GLB,, | Performed by: ORTHOPAEDIC SURGERY

## 2022-01-21 PROCEDURE — 99203 OFFICE O/P NEW LOW 30 MIN: CPT | Mod: S$GLB,,, | Performed by: ORTHOPAEDIC SURGERY

## 2022-01-21 PROCEDURE — 99999 PR PBB SHADOW E&M-EST. PATIENT-LVL III: ICD-10-PCS | Mod: PBBFAC,,, | Performed by: ORTHOPAEDIC SURGERY

## 2022-01-21 RX ORDER — ETODOLAC 500 MG/1
500 TABLET, FILM COATED ORAL 2 TIMES DAILY
Qty: 60 TABLET | Refills: 0 | Status: SHIPPED | OUTPATIENT
Start: 2022-01-21 | End: 2022-02-20

## 2022-02-01 NOTE — PROGRESS NOTES
"Subjective:      Patient ID: Alessandro Cerna is a 66 y.o. male.    Chief Complaint: Follow-up and Finger Injury (Right Thumb Laceration)      HPI: Alessandro Cerna is here in follow-up of right thumb laceration, now about 7 weeks out from initial injury. He was treated at ED/ UC. At his last visit, ROM and sensation were intact but he had persistent swelling and difficulty with fine manipulation. Xray negative for retained foreign body. Today, reports significant improvement in swelling. Able to use his hand more. No pain    Past Medical History:   Diagnosis Date    Chronic seasonal allergic rhinitis 11/16/2017    GERD (gastroesophageal reflux disease)     TB (pulmonary tuberculosis) 1982    Treated [facility] 9 mo therapy.       Current Outpatient Medications:     atorvastatin (LIPITOR) 10 MG tablet, Take 1 tablet (10 mg total) by mouth every evening., Disp: 90 tablet, Rfl: 3    ergocalciferol (ERGOCALCIFEROL) 50,000 unit Cap, Take 1 capsule (50,000 Units total) by mouth every 7 days. Then start daily OTC replacement after this Rx is complete, Disp: 12 capsule, Rfl: 0    etodolac (LODINE) 500 MG tablet, Take 1 tablet (500 mg total) by mouth 2 (two) times daily., Disp: 60 tablet, Rfl: 0    pantoprazole (PROTONIX) 40 MG tablet, Take 1 tablet (40 mg total) by mouth 2 (two) times daily., Disp: 60 tablet, Rfl: 6    tadalafiL (CIALIS) 20 MG Tab, TAKE 1 TABLET (20 MG TOTAL) BY MOUTH DAILY TO EVERY OTHER DAY AS NEEDED, Disp: 10 tablet, Rfl: 0  Review of patient's allergies indicates:  No Known Allergies    Ht 5' 7" (1.702 m)   Wt 78.5 kg (173 lb 1 oz)   BMI 27.11 kg/m²     Review of Systems   Constitutional: Negative for chills and fever.   Cardiovascular: Negative for chest pain and palpitations.   Respiratory: Negative for shortness of breath and wheezing.    Skin: Negative for poor wound healing and rash.   Musculoskeletal: Positive for joint swelling.   Gastrointestinal: Negative for nausea and vomiting. "   Genitourinary: Negative for dysuria and hematuria.   Neurological: Negative for seizures and tremors.   Psychiatric/Behavioral: Negative for altered mental status.   Allergic/Immunologic: Negative for environmental allergies and persistent infections.         Objective:    Ortho Exam       Right UE: Skin laceration scar well healed. Swelling significantly improved. TTP none. ROM full. Sensation intact. Tinels n/a. Pulses present. Cap refill brisk.     Assessment:     Imaging: No new        1. Thumb laceration, right, subsequent encounter          Plan:           Significant improvement from last visit  No concern for felon or infection, no need for further imaging  Continue topical cream and massage  Activity as tolerated    Follow up in about 4 weeks (around 3/3/2022).

## 2022-02-03 ENCOUNTER — OFFICE VISIT (OUTPATIENT)
Dept: ORTHOPEDICS | Facility: CLINIC | Age: 67
End: 2022-02-03
Payer: COMMERCIAL

## 2022-02-03 VITALS — WEIGHT: 173.06 LBS | BODY MASS INDEX: 27.16 KG/M2 | HEIGHT: 67 IN

## 2022-02-03 DIAGNOSIS — S61.011D THUMB LACERATION, RIGHT, SUBSEQUENT ENCOUNTER: Primary | ICD-10-CM

## 2022-02-03 PROCEDURE — 99999 PR PBB SHADOW E&M-EST. PATIENT-LVL III: ICD-10-PCS | Mod: PBBFAC,,, | Performed by: ORTHOPAEDIC SURGERY

## 2022-02-03 PROCEDURE — 1159F MED LIST DOCD IN RCRD: CPT | Mod: CPTII,S$GLB,, | Performed by: ORTHOPAEDIC SURGERY

## 2022-02-03 PROCEDURE — 1160F RVW MEDS BY RX/DR IN RCRD: CPT | Mod: CPTII,S$GLB,, | Performed by: ORTHOPAEDIC SURGERY

## 2022-02-03 PROCEDURE — 1160F PR REVIEW ALL MEDS BY PRESCRIBER/CLIN PHARMACIST DOCUMENTED: ICD-10-PCS | Mod: CPTII,S$GLB,, | Performed by: ORTHOPAEDIC SURGERY

## 2022-02-03 PROCEDURE — 1159F PR MEDICATION LIST DOCUMENTED IN MEDICAL RECORD: ICD-10-PCS | Mod: CPTII,S$GLB,, | Performed by: ORTHOPAEDIC SURGERY

## 2022-02-03 PROCEDURE — 3008F PR BODY MASS INDEX (BMI) DOCUMENTED: ICD-10-PCS | Mod: CPTII,S$GLB,, | Performed by: ORTHOPAEDIC SURGERY

## 2022-02-03 PROCEDURE — 3008F BODY MASS INDEX DOCD: CPT | Mod: CPTII,S$GLB,, | Performed by: ORTHOPAEDIC SURGERY

## 2022-02-03 PROCEDURE — 99212 PR OFFICE/OUTPT VISIT, EST, LEVL II, 10-19 MIN: ICD-10-PCS | Mod: S$GLB,,, | Performed by: ORTHOPAEDIC SURGERY

## 2022-02-03 PROCEDURE — 99999 PR PBB SHADOW E&M-EST. PATIENT-LVL III: CPT | Mod: PBBFAC,,, | Performed by: ORTHOPAEDIC SURGERY

## 2022-02-03 PROCEDURE — 99212 OFFICE O/P EST SF 10 MIN: CPT | Mod: S$GLB,,, | Performed by: ORTHOPAEDIC SURGERY

## 2022-02-28 ENCOUNTER — PATIENT OUTREACH (OUTPATIENT)
Dept: ADMINISTRATIVE | Facility: OTHER | Age: 67
End: 2022-02-28
Payer: COMMERCIAL

## 2022-03-01 NOTE — PROGRESS NOTES
Health Maintenance Due   Topic Date Due    Shingles Vaccine (1 of 2) Never done    TETANUS VACCINE  10/25/2015    COVID-19 Vaccine (3 - Booster for Moderna series) 09/13/2021     Updates were requested from care everywhere.  Chart was reviewed for overdue [facility] ([facility]) topics (CRS, Breast Cancer Screening, Eye exam)  Health Maintenance has been updated.  LINKS immunization registry triggered.  Immunizations were reconciled.

## 2022-03-03 ENCOUNTER — OFFICE VISIT (OUTPATIENT)
Dept: ORTHOPEDICS | Facility: CLINIC | Age: 67
End: 2022-03-03
Payer: COMMERCIAL

## 2022-03-03 VITALS — HEIGHT: 67 IN | WEIGHT: 173 LBS | BODY MASS INDEX: 27.15 KG/M2

## 2022-03-03 DIAGNOSIS — S61.011D THUMB LACERATION, RIGHT, SUBSEQUENT ENCOUNTER: Primary | ICD-10-CM

## 2022-03-03 PROCEDURE — 3008F BODY MASS INDEX DOCD: CPT | Mod: CPTII,S$GLB,, | Performed by: ORTHOPAEDIC SURGERY

## 2022-03-03 PROCEDURE — 1159F PR MEDICATION LIST DOCUMENTED IN MEDICAL RECORD: ICD-10-PCS | Mod: CPTII,S$GLB,, | Performed by: ORTHOPAEDIC SURGERY

## 2022-03-03 PROCEDURE — 99499 UNLISTED E&M SERVICE: CPT | Mod: S$GLB,,, | Performed by: ORTHOPAEDIC SURGERY

## 2022-03-03 PROCEDURE — 99499 NO LOS: ICD-10-PCS | Mod: S$GLB,,, | Performed by: ORTHOPAEDIC SURGERY

## 2022-03-03 PROCEDURE — 99999 PR PBB SHADOW E&M-EST. PATIENT-LVL II: ICD-10-PCS | Mod: PBBFAC,,, | Performed by: ORTHOPAEDIC SURGERY

## 2022-03-03 PROCEDURE — 3008F PR BODY MASS INDEX (BMI) DOCUMENTED: ICD-10-PCS | Mod: CPTII,S$GLB,, | Performed by: ORTHOPAEDIC SURGERY

## 2022-03-03 PROCEDURE — 1159F MED LIST DOCD IN RCRD: CPT | Mod: CPTII,S$GLB,, | Performed by: ORTHOPAEDIC SURGERY

## 2022-03-03 PROCEDURE — 99999 PR PBB SHADOW E&M-EST. PATIENT-LVL II: CPT | Mod: PBBFAC,,, | Performed by: ORTHOPAEDIC SURGERY

## 2022-03-03 NOTE — PROGRESS NOTES
"Subjective:      Patient ID: Alessandro Cerna is a 66 y.o. male.    Chief Complaint: Laceration (Right thumb laceration)      HPI: Alessandro Cerna is here in follow-up of right thumb laceration, now about 11 weeks out from initial injury. Laceration has healed beautifully and is now painless and without swelling. However, he does have IP stiffness and discomfort with writing with a pen/ pencil. Otherwise, minimal pain. Great ROM.     Past Medical History:   Diagnosis Date    Chronic seasonal allergic rhinitis 11/16/2017    GERD (gastroesophageal reflux disease)     TB (pulmonary tuberculosis) 1982    Treated [facility] 9 mo therapy.       Current Outpatient Medications:     atorvastatin (LIPITOR) 10 MG tablet, Take 1 tablet (10 mg total) by mouth every evening., Disp: 90 tablet, Rfl: 3    ergocalciferol (ERGOCALCIFEROL) 50,000 unit Cap, Take 1 capsule (50,000 Units total) by mouth every 7 days. Then start daily OTC replacement after this Rx is complete, Disp: 12 capsule, Rfl: 0    pantoprazole (PROTONIX) 40 MG tablet, Take 1 tablet (40 mg total) by mouth 2 (two) times daily., Disp: 60 tablet, Rfl: 6    tadalafiL (CIALIS) 20 MG Tab, TAKE 1 TABLET (20 MG TOTAL) BY MOUTH DAILY TO EVERY OTHER DAY AS NEEDED, Disp: 10 tablet, Rfl: 0  Review of patient's allergies indicates:  No Known Allergies    Ht 5' 7" (1.702 m)   Wt 78.5 kg (173 lb)   BMI 27.10 kg/m²     Review of Systems   Constitutional: Negative for chills and fever.   Cardiovascular: Negative for chest pain and palpitations.   Respiratory: Negative for shortness of breath and wheezing.    Skin: Negative for poor wound healing and rash.   Musculoskeletal: Positive for stiffness.   Gastrointestinal: Negative for nausea and vomiting.   Genitourinary: Negative for dysuria and hematuria.   Neurological: Negative for seizures and tremors.   Psychiatric/Behavioral: Negative for altered mental status.   Allergic/Immunologic: Negative for environmental " allergies and persistent infections.         Objective:    Ortho Exam     Right UE: Skin laceration scar well healed. Swelling resolved. TTP none. Sensation intact. Tinels n/a. Pulses present. Cap refill brisk. ROM IP full      Assessment:     Imaging: no new        1. Thumb laceration, right, subsequent encounter          Plan:       Excellent result  Discussed the IP joint may be stiff and painful from time to time, this does not limit ADLs  Recommend OTC analgesia and warm water soaks as needed  Writing modification made easily  Activity as tolerated        No follow-ups on file.

## 2022-03-22 ENCOUNTER — TELEPHONE (OUTPATIENT)
Dept: FAMILY MEDICINE | Facility: CLINIC | Age: 67
End: 2022-03-22

## 2022-03-22 ENCOUNTER — OFFICE VISIT (OUTPATIENT)
Dept: FAMILY MEDICINE | Facility: CLINIC | Age: 67
End: 2022-03-22
Payer: COMMERCIAL

## 2022-03-22 ENCOUNTER — TELEPHONE (OUTPATIENT)
Dept: SURGERY | Facility: CLINIC | Age: 67
End: 2022-03-22
Payer: COMMERCIAL

## 2022-03-22 VITALS
TEMPERATURE: 98 F | BODY MASS INDEX: 27.93 KG/M2 | OXYGEN SATURATION: 98 % | SYSTOLIC BLOOD PRESSURE: 120 MMHG | HEART RATE: 80 BPM | WEIGHT: 177.94 LBS | DIASTOLIC BLOOD PRESSURE: 60 MMHG | HEIGHT: 67 IN

## 2022-03-22 DIAGNOSIS — E55.9 VITAMIN D DEFICIENCY: ICD-10-CM

## 2022-03-22 DIAGNOSIS — E78.49 OTHER HYPERLIPIDEMIA: ICD-10-CM

## 2022-03-22 DIAGNOSIS — K62.5 BRBPR (BRIGHT RED BLOOD PER RECTUM): Primary | ICD-10-CM

## 2022-03-22 DIAGNOSIS — Z91.89 10 YEAR RISK OF MI OR STROKE 7.5% OR GREATER: ICD-10-CM

## 2022-03-22 DIAGNOSIS — K21.9 GERD WITHOUT ESOPHAGITIS: ICD-10-CM

## 2022-03-22 DIAGNOSIS — N52.01 ERECTILE DYSFUNCTION DUE TO ARTERIAL INSUFFICIENCY: ICD-10-CM

## 2022-03-22 PROCEDURE — 3008F BODY MASS INDEX DOCD: CPT | Mod: CPTII,S$GLB,, | Performed by: FAMILY MEDICINE

## 2022-03-22 PROCEDURE — 3074F PR MOST RECENT SYSTOLIC BLOOD PRESSURE < 130 MM HG: ICD-10-PCS | Mod: CPTII,S$GLB,, | Performed by: FAMILY MEDICINE

## 2022-03-22 PROCEDURE — 1159F MED LIST DOCD IN RCRD: CPT | Mod: CPTII,S$GLB,, | Performed by: FAMILY MEDICINE

## 2022-03-22 PROCEDURE — 99214 PR OFFICE/OUTPT VISIT, EST, LEVL IV, 30-39 MIN: ICD-10-PCS | Mod: S$GLB,,, | Performed by: FAMILY MEDICINE

## 2022-03-22 PROCEDURE — 99999 PR PBB SHADOW E&M-EST. PATIENT-LVL IV: CPT | Mod: PBBFAC,,, | Performed by: FAMILY MEDICINE

## 2022-03-22 PROCEDURE — 3078F DIAST BP <80 MM HG: CPT | Mod: CPTII,S$GLB,, | Performed by: FAMILY MEDICINE

## 2022-03-22 PROCEDURE — 99999 PR PBB SHADOW E&M-EST. PATIENT-LVL IV: ICD-10-PCS | Mod: PBBFAC,,, | Performed by: FAMILY MEDICINE

## 2022-03-22 PROCEDURE — 3008F PR BODY MASS INDEX (BMI) DOCUMENTED: ICD-10-PCS | Mod: CPTII,S$GLB,, | Performed by: FAMILY MEDICINE

## 2022-03-22 PROCEDURE — 99214 OFFICE O/P EST MOD 30 MIN: CPT | Mod: S$GLB,,, | Performed by: FAMILY MEDICINE

## 2022-03-22 PROCEDURE — 3078F PR MOST RECENT DIASTOLIC BLOOD PRESSURE < 80 MM HG: ICD-10-PCS | Mod: CPTII,S$GLB,, | Performed by: FAMILY MEDICINE

## 2022-03-22 PROCEDURE — 1159F PR MEDICATION LIST DOCUMENTED IN MEDICAL RECORD: ICD-10-PCS | Mod: CPTII,S$GLB,, | Performed by: FAMILY MEDICINE

## 2022-03-22 PROCEDURE — 1160F PR REVIEW ALL MEDS BY PRESCRIBER/CLIN PHARMACIST DOCUMENTED: ICD-10-PCS | Mod: CPTII,S$GLB,, | Performed by: FAMILY MEDICINE

## 2022-03-22 PROCEDURE — 3074F SYST BP LT 130 MM HG: CPT | Mod: CPTII,S$GLB,, | Performed by: FAMILY MEDICINE

## 2022-03-22 PROCEDURE — 1160F RVW MEDS BY RX/DR IN RCRD: CPT | Mod: CPTII,S$GLB,, | Performed by: FAMILY MEDICINE

## 2022-03-22 RX ORDER — TADALAFIL 20 MG/1
TABLET ORAL
Qty: 10 TABLET | Refills: 11 | Status: SHIPPED | OUTPATIENT
Start: 2022-03-22 | End: 2022-03-22 | Stop reason: SDUPTHER

## 2022-03-22 RX ORDER — PANTOPRAZOLE SODIUM 40 MG/1
40 TABLET, DELAYED RELEASE ORAL 2 TIMES DAILY
Qty: 180 TABLET | Refills: 2 | Status: SHIPPED | OUTPATIENT
Start: 2022-03-22 | End: 2022-07-08 | Stop reason: SDUPTHER

## 2022-03-22 RX ORDER — ATORVASTATIN CALCIUM 10 MG/1
10 TABLET, FILM COATED ORAL NIGHTLY
Qty: 90 TABLET | Refills: 3 | Status: SHIPPED | OUTPATIENT
Start: 2022-03-22 | End: 2022-07-08 | Stop reason: SDUPTHER

## 2022-03-22 RX ORDER — ERGOCALCIFEROL 1.25 MG/1
50000 CAPSULE ORAL
Qty: 12 CAPSULE | Refills: 2 | Status: SHIPPED | OUTPATIENT
Start: 2022-03-22 | End: 2022-07-08 | Stop reason: SDUPTHER

## 2022-03-22 RX ORDER — TADALAFIL 20 MG/1
TABLET ORAL
Qty: 10 TABLET | Refills: 11 | Status: SHIPPED | OUTPATIENT
Start: 2022-03-22 | End: 2022-05-02 | Stop reason: SDUPTHER

## 2022-03-22 NOTE — TELEPHONE ENCOUNTER
Spoke with patient and appointment made to see Dr. Morgan for rectal bleeding. Appointment details confirmed verbally with patient.

## 2022-03-22 NOTE — PROGRESS NOTES
Subjective:       Patient ID: Alessandro Cerna is a 66 y.o. male.    Chief Complaint: Hypertension    Alessandro is a 66 y.o. male who presents today for f/u    Israel Galindo: had EGD, 2021. Saw GI.   shirley thacker: completed quad therapy. Test for cure was negative.   Repeated blood in the stool, no pain with BM. Notices bright red blood in the stool and when he wipes. At least 2-3 times a week.   GERD: no issues.     BP: stable. Was higher at home, when stressed. Normal at home since.   DLD: on statin      Review of Systems   Constitutional: Negative for chills and fever.   HENT: Negative for trouble swallowing.    Gastrointestinal: Positive for blood in stool. Negative for abdominal pain, diarrhea, nausea and vomiting.   Neurological: Negative for dizziness, light-headedness and headaches.           Objective:     Vitals:    03/22/22 0748   BP: 120/60   Pulse: 80   Temp: 97.7 °F (36.5 °C)        Physical Exam  Constitutional:       General: He is not in acute distress.     Appearance: He is not ill-appearing, toxic-appearing or diaphoretic.   Cardiovascular:      Rate and Rhythm: Normal rate and regular rhythm.   Pulmonary:      Effort: Pulmonary effort is normal.      Breath sounds: Normal breath sounds.   Abdominal:      Palpations: Abdomen is soft.      Tenderness: There is no abdominal tenderness.   Musculoskeletal:         General: No swelling.   Neurological:      Mental Status: He is alert.   Psychiatric:         Mood and Affect: Mood normal.         Behavior: Behavior normal.         Thought Content: Thought content normal.         Judgment: Judgment normal.         Assessment:       1. BRBPR (bright red blood per rectum)    2. Vitamin D deficiency    3. Other hyperlipidemia    4. 10 year risk of MI or stroke 7.5% or greater    5. GERD without esophagitis    6. Erectile dysfunction due to arterial insufficiency        Plan:       Refer back to GI  Capsule endoscopy??  Continued BRBPR, h pylori treated, no  hemorrhoids noted on c-scope    Continue atorvastatin    Labs in June, f/u after. Annual.     BRBPR (bright red blood per rectum)  -     Ambulatory referral/consult to Gastroenterology; Future; Expected date: 03/29/2022    Vitamin D deficiency  -     ergocalciferol (ERGOCALCIFEROL) 50,000 unit Cap; Take 1 capsule (50,000 Units total) by mouth every 7 days. Then start daily OTC replacement after this Rx is complete  Dispense: 12 capsule; Refill: 2    Other hyperlipidemia  -     atorvastatin (LIPITOR) 10 MG tablet; Take 1 tablet (10 mg total) by mouth every evening.  Dispense: 90 tablet; Refill: 3    10 year risk of MI or stroke 7.5% or greater  -     atorvastatin (LIPITOR) 10 MG tablet; Take 1 tablet (10 mg total) by mouth every evening.  Dispense: 90 tablet; Refill: 3    GERD without esophagitis  -     pantoprazole (PROTONIX) 40 MG tablet; Take 1 tablet (40 mg total) by mouth 2 (two) times daily.  Dispense: 180 tablet; Refill: 2  -     Iron and TIBC; Future; Expected date: 03/22/2022  -     Ferritin; Future; Expected date: 03/22/2022  -     Ambulatory referral/consult to Gastroenterology; Future; Expected date: 03/29/2022    Erectile dysfunction due to arterial insufficiency  -     Discontinue: tadalafiL (CIALIS) 20 MG Tab; TAKE 1 TABLET (20 MG TOTAL) BY MOUTH DAILY TO EVERY OTHER DAY AS NEEDED  Dispense: 10 tablet; Refill: 11  -     tadalafiL (CIALIS) 20 MG Tab; TAKE 1 TABLET (20 MG TOTAL) BY MOUTH DAILY TO EVERY OTHER DAY AS NEEDED  Dispense: 10 tablet; Refill: 11        Warning signs discussed, patient to call with any further issues or worsening of symptoms.

## 2022-03-22 NOTE — TELEPHONE ENCOUNTER
Dr. Morgan,   See message below  GI would prefer colorectal evaluation prior to GI    Can you see patient in clinic first?    Riesel, can you schedule with Dr. Morgan and cancel GI for now and let patient know?    Thanks    JM

## 2022-03-22 NOTE — TELEPHONE ENCOUNTER
----- Message from Mo Blanco MD sent at 3/22/2022 11:17 AM CDT -----  I would have him see colorectal surgery first...   Can you arrange or would you like me to  He looks like had small hemorrhoids when I look at the pictures, sometimes we dont comment on them all the time.  I would see what CRS says first.    ----- Message -----  From: Jamaal Cabrera DO  Sent: 3/22/2022   8:38 AM CDT  To: Mo Blanco MD    Hi,   Patient having continued BRBPR, multiple times a week. Normal EGD and c-scope. He may need video studies? Will have him f/u with you in clinic, unless you think another provider (advanced endoscopy??) would be more appropriate. Thank you!    JM

## 2022-03-22 NOTE — Clinical Note
Hi,  Patient having continued BRBPR, multiple times a week. Normal EGD and c-scope. He may need video studies? Will have him f/u with you in clinic, unless you think another provider (advanced endoscopy??) would be more appropriate. Thank you!  ORNE

## 2022-03-31 ENCOUNTER — OFFICE VISIT (OUTPATIENT)
Dept: SURGERY | Facility: CLINIC | Age: 67
End: 2022-03-31
Payer: COMMERCIAL

## 2022-03-31 ENCOUNTER — TELEPHONE (OUTPATIENT)
Dept: SURGERY | Facility: CLINIC | Age: 67
End: 2022-03-31
Payer: COMMERCIAL

## 2022-03-31 VITALS
DIASTOLIC BLOOD PRESSURE: 72 MMHG | HEART RATE: 78 BPM | SYSTOLIC BLOOD PRESSURE: 110 MMHG | WEIGHT: 174.63 LBS | HEIGHT: 67 IN | BODY MASS INDEX: 27.41 KG/M2

## 2022-03-31 DIAGNOSIS — K62.5 BRBPR (BRIGHT RED BLOOD PER RECTUM): ICD-10-CM

## 2022-03-31 DIAGNOSIS — K64.0 GRADE I HEMORRHOIDS: Primary | ICD-10-CM

## 2022-03-31 PROCEDURE — 99203 OFFICE O/P NEW LOW 30 MIN: CPT | Mod: 25,S$GLB,, | Performed by: COLON & RECTAL SURGERY

## 2022-03-31 PROCEDURE — 99203 PR OFFICE/OUTPT VISIT, NEW, LEVL III, 30-44 MIN: ICD-10-PCS | Mod: 25,S$GLB,, | Performed by: COLON & RECTAL SURGERY

## 2022-03-31 PROCEDURE — 1160F RVW MEDS BY RX/DR IN RCRD: CPT | Mod: CPTII,S$GLB,, | Performed by: COLON & RECTAL SURGERY

## 2022-03-31 PROCEDURE — 46600 DIAGNOSTIC ANOSCOPY SPX: CPT | Mod: S$GLB,,, | Performed by: COLON & RECTAL SURGERY

## 2022-03-31 PROCEDURE — 1159F PR MEDICATION LIST DOCUMENTED IN MEDICAL RECORD: ICD-10-PCS | Mod: CPTII,S$GLB,, | Performed by: COLON & RECTAL SURGERY

## 2022-03-31 PROCEDURE — 3008F PR BODY MASS INDEX (BMI) DOCUMENTED: ICD-10-PCS | Mod: CPTII,S$GLB,, | Performed by: COLON & RECTAL SURGERY

## 2022-03-31 PROCEDURE — 99999 PR PBB SHADOW E&M-EST. PATIENT-LVL III: ICD-10-PCS | Mod: PBBFAC,,, | Performed by: COLON & RECTAL SURGERY

## 2022-03-31 PROCEDURE — 3074F SYST BP LT 130 MM HG: CPT | Mod: CPTII,S$GLB,, | Performed by: COLON & RECTAL SURGERY

## 2022-03-31 PROCEDURE — 99999 PR PBB SHADOW E&M-EST. PATIENT-LVL III: CPT | Mod: PBBFAC,,, | Performed by: COLON & RECTAL SURGERY

## 2022-03-31 PROCEDURE — 3008F BODY MASS INDEX DOCD: CPT | Mod: CPTII,S$GLB,, | Performed by: COLON & RECTAL SURGERY

## 2022-03-31 PROCEDURE — 3074F PR MOST RECENT SYSTOLIC BLOOD PRESSURE < 130 MM HG: ICD-10-PCS | Mod: CPTII,S$GLB,, | Performed by: COLON & RECTAL SURGERY

## 2022-03-31 PROCEDURE — 46600 PR DIAG2STIC A2SCOPY: ICD-10-PCS | Mod: S$GLB,,, | Performed by: COLON & RECTAL SURGERY

## 2022-03-31 PROCEDURE — 1159F MED LIST DOCD IN RCRD: CPT | Mod: CPTII,S$GLB,, | Performed by: COLON & RECTAL SURGERY

## 2022-03-31 PROCEDURE — 3078F DIAST BP <80 MM HG: CPT | Mod: CPTII,S$GLB,, | Performed by: COLON & RECTAL SURGERY

## 2022-03-31 PROCEDURE — 3078F PR MOST RECENT DIASTOLIC BLOOD PRESSURE < 80 MM HG: ICD-10-PCS | Mod: CPTII,S$GLB,, | Performed by: COLON & RECTAL SURGERY

## 2022-03-31 PROCEDURE — 1160F PR REVIEW ALL MEDS BY PRESCRIBER/CLIN PHARMACIST DOCUMENTED: ICD-10-PCS | Mod: CPTII,S$GLB,, | Performed by: COLON & RECTAL SURGERY

## 2022-03-31 NOTE — PROGRESS NOTES
CRS Office Visit History and Physical    Referring Md:   Jamaal Cabrera,   [address]  [place]  [place] [id number]    SUBJECTIVE:     Chief Complaint: rectal bleeding     History of Present Illness:  The patient is a new patient to this practice.   Course is as follows:  Patient is a 66 y.o. male presents with rectal bleeding.   Longstanding history of intermittent bright red blood per rectum with defecation.  No pain during defecation.  No prolapse of tissue.  He has 1 bowel movement per day.  He spends upwards of 60 minutes on the toilet for each bowel movement.  His legs frequently go numb.  No family history of colorectal cancer inflammatory bowel disease.  No past anorectal surgeries.  No issues with fecal incontinence.    Last Colonoscopy: 8/4/21:  Impression:            - One 3 mm polyp in the transverse colon, removed                          with a cold biopsy forceps. Resected and retrieved.  --> lymphoid aggregate                         - The examination was otherwise normal on direct                          and retroflexion views.     Review of patient's allergies indicates:  No Known Allergies    Past Medical History:   Diagnosis Date    Chronic seasonal allergic rhinitis 11/16/2017    GERD (gastroesophageal reflux disease)     TB (pulmonary tuberculosis) 1982    Treated CHNO 9 mo therapy.     Past Surgical History:   Procedure Laterality Date    COLONOSCOPY N/A 8/4/2021    Procedure: COLONOSCOPY   suprep;  Surgeon: Mo Blanco MD;  Location: [facility];  Service: Endoscopy;  Laterality: N/A;  covid test; 08/01/2021(sunday) [facility];00pm                          ANB    COLONOSCOPY W/ POLYPECTOMY  08/04/2021    ESOPHAGOGASTRODUODENOSCOPY N/A 8/4/2021    Procedure: EGD (ESOPHAGOGASTRODUODENOSCOPY);  Surgeon: Mo Blanco MD;  Location: [facility];  Service: Endoscopy;  Laterality: N/A;    ESOPHAGOGASTRODUODENOSCOPY (EGD) WITH DILATION  08/04/2021    IRRIGATION AND DEBRIDEMENT  "OF LOWER EXTREMITY Left 2019    Procedure: IRRIGATION AND DEBRIDEMENT, LOWER EXTREMITY Bone biopsy heel;  Surgeon: Karo Marsh MD;  Location[facility];  Service: Podiatry;  Laterality: Left;     Family History   Problem Relation Age of Onset    Breast cancer Mother     Cancer Mother     Prostate cancer Father     Cancer Father     No Known Problems Sister     No Known Problems Brother     No Known Problems Brother     No Known Problems Brother     Emphysema Neg Hx      Social History     Tobacco Use    Smoking status: Former Smoker     Packs/day: 1.50     Years: 2.00     Pack years: 3.00     Types: Cigarettes     Quit date: 4/3/1977     Years since quittin.0    Smokeless tobacco: Never Used   Substance Use Topics    Alcohol use: Yes     Alcohol/week: 3.0 standard drinks     Types: 3 Cans of beer per week     Comment: one drink/week    Drug use: No        Review of Systems:  Review of Systems   Constitutional: Negative for chills, diaphoresis, fever, malaise/fatigue and weight loss.   HENT: Negative for congestion.    Respiratory: Negative for shortness of breath.    Cardiovascular: Negative for chest pain and leg swelling.   Gastrointestinal: Positive for blood in stool. Negative for abdominal pain, constipation, nausea and vomiting.   Genitourinary: Negative for dysuria.   Musculoskeletal: Negative for back pain and myalgias.   Skin: Negative for rash.   Neurological: Negative for dizziness and weakness.   Endo/Heme/Allergies: Does not bruise/bleed easily.   Psychiatric/Behavioral: Negative for depression.       OBJECTIVE:     Vital Signs (Most Recent)  /72 (BP Location: Left arm, Patient Position: Sitting, BP Method: Large (Automatic))   Pulse 78   Ht 5' 7" (1.702 m)   Wt 79.2 kg (174 lb 9.7 oz)   BMI 27.35 kg/m²     Physical Exam:  General: White male in no distress   Neuro: alert and oriented x 4.  Moves all extremities.     HEENT: no icterus.  Trachea " midline  Respiratory: respirations are even and unlabored  Cardiac: regular rate  Abdomen:  Soft, nontender, no masses  Extremities: Warm dry and intact  Skin: no rashes  Anorectal:  External exam was normal.  Digital exam performed.  Normal tone.  Soft internal hemorrhoids palpated.  Detailed anoscopy performed.  Large grade 1 hemorrhoids seen in all 3 quadrants with evidence of recent bleeding.    Labs: H&H 13 and 41.  Normal renal function.  Alb 4.1    Imaging: no recent abdominal imaging.       ASSESSMENT/PLAN:     Alessandro was seen today for rectal bleeding.    Diagnoses and all orders for this visit:    Grade I hemorrhoids    BRBPR (bright red blood per rectum)  -     Ambulatory referral/consult to Colorectal Surgery        66 y.o. male with large grade 1 internal hemorrhoids with intermittent bright red blood per rectum.  Reassurance provided regarding the source of the rectal bleeding.  Recommended starting with avoidance of sitting long in the 5 minutes on the toilet in no phone usage.  Additionally, recommended Citrucel for stool bulking.  If he continues to have bleeding, he would be a good candidate for banding.    Limited benefit of a small-bowel follow-through as the source of his bleeding was well visualized on today's exam.    ROZINA Morgan MD, FACS, FASCRS  Staff Surgeon  Colon & Rectal Surgery

## 2022-05-02 DIAGNOSIS — N52.01 ERECTILE DYSFUNCTION DUE TO ARTERIAL INSUFFICIENCY: ICD-10-CM

## 2022-05-02 RX ORDER — TADALAFIL 20 MG/1
TABLET ORAL
Qty: 10 TABLET | Refills: 0 | Status: SHIPPED | OUTPATIENT
Start: 2022-05-02 | End: 2022-05-04 | Stop reason: SDUPTHER

## 2022-05-02 NOTE — TELEPHONE ENCOUNTER
Care Due:                  Date            Visit Type   Department     Provider  --------------------------------------------------------------------------------                                EP -                              PRIMARY      [facility]  Last Visit: 03-      CARE ([facility])   [facility]       Jamaal Cabrera                               -                              [facility]  Next Visit: 07-      CARE ([facility])   [facility]       [facility]                                                            Last  Test          Frequency    Reason                     Performed    Due Date  --------------------------------------------------------------------------------    CMP.........  12 months..  atorvastatin.............  06-   06-    Lipid Panel.  12 months..  atorvastatin.............  06-   06-    Powered by [employer]. Reference number: [id number].   5/02/2022 2:31:33 PM CDT

## 2022-05-03 ENCOUNTER — TELEPHONE (OUTPATIENT)
Dept: UROLOGY | Facility: CLINIC | Age: 67
End: 2022-05-03
Payer: COMMERCIAL

## 2022-05-04 ENCOUNTER — TELEPHONE (OUTPATIENT)
Dept: FAMILY MEDICINE | Facility: CLINIC | Age: 67
End: 2022-05-04
Payer: COMMERCIAL

## 2022-05-04 DIAGNOSIS — N52.01 ERECTILE DYSFUNCTION DUE TO ARTERIAL INSUFFICIENCY: ICD-10-CM

## 2022-05-04 DIAGNOSIS — N13.8 BPH WITH URINARY OBSTRUCTION: Primary | ICD-10-CM

## 2022-05-04 DIAGNOSIS — N40.1 BPH WITH URINARY OBSTRUCTION: Primary | ICD-10-CM

## 2022-05-04 RX ORDER — TADALAFIL 20 MG/1
TABLET ORAL
Qty: 10 TABLET | Refills: 6 | Status: SHIPPED | OUTPATIENT
Start: 2022-05-04 | End: 2022-05-17 | Stop reason: SDUPTHER

## 2022-05-17 ENCOUNTER — OFFICE VISIT (OUTPATIENT)
Dept: UROLOGY | Facility: CLINIC | Age: 67
End: 2022-05-17
Payer: COMMERCIAL

## 2022-05-17 VITALS
HEART RATE: 60 BPM | HEIGHT: 67 IN | SYSTOLIC BLOOD PRESSURE: 128 MMHG | WEIGHT: 178.56 LBS | DIASTOLIC BLOOD PRESSURE: 78 MMHG | BODY MASS INDEX: 28.02 KG/M2

## 2022-05-17 DIAGNOSIS — N40.1 BPH WITH URINARY OBSTRUCTION: Primary | ICD-10-CM

## 2022-05-17 DIAGNOSIS — N13.8 BPH WITH URINARY OBSTRUCTION: Primary | ICD-10-CM

## 2022-05-17 DIAGNOSIS — N52.01 ERECTILE DYSFUNCTION DUE TO ARTERIAL INSUFFICIENCY: ICD-10-CM

## 2022-05-17 LAB
BILIRUB SERPL-MCNC: NORMAL MG/DL
BLOOD URINE, POC: NORMAL
CLARITY, POC UA: CLEAR
COLOR, POC UA: YELLOW
GLUCOSE UR QL STRIP: NORMAL
KETONES UR QL STRIP: NORMAL
LEUKOCYTE ESTERASE URINE, POC: NORMAL
NITRITE, POC UA: NORMAL
PH, POC UA: 5
PROTEIN, POC: NORMAL
SPECIFIC GRAVITY, POC UA: 1.02
UROBILINOGEN, POC UA: NORMAL

## 2022-05-17 PROCEDURE — 99214 OFFICE O/P EST MOD 30 MIN: CPT | Mod: S$GLB,,, | Performed by: NURSE PRACTITIONER

## 2022-05-17 PROCEDURE — 3074F SYST BP LT 130 MM HG: CPT | Mod: CPTII,S$GLB,, | Performed by: NURSE PRACTITIONER

## 2022-05-17 PROCEDURE — 1159F MED LIST DOCD IN RCRD: CPT | Mod: CPTII,S$GLB,, | Performed by: NURSE PRACTITIONER

## 2022-05-17 PROCEDURE — 3074F PR MOST RECENT SYSTOLIC BLOOD PRESSURE < 130 MM HG: ICD-10-PCS | Mod: CPTII,S$GLB,, | Performed by: NURSE PRACTITIONER

## 2022-05-17 PROCEDURE — 99999 PR PBB SHADOW E&M-EST. PATIENT-LVL III: CPT | Mod: PBBFAC,,, | Performed by: NURSE PRACTITIONER

## 2022-05-17 PROCEDURE — 3008F PR BODY MASS INDEX (BMI) DOCUMENTED: ICD-10-PCS | Mod: CPTII,S$GLB,, | Performed by: NURSE PRACTITIONER

## 2022-05-17 PROCEDURE — 81002 URINALYSIS NONAUTO W/O SCOPE: CPT | Mod: S$GLB,,, | Performed by: NURSE PRACTITIONER

## 2022-05-17 PROCEDURE — 99214 PR OFFICE/OUTPT VISIT, EST, LEVL IV, 30-39 MIN: ICD-10-PCS | Mod: S$GLB,,, | Performed by: NURSE PRACTITIONER

## 2022-05-17 PROCEDURE — 99999 PR PBB SHADOW E&M-EST. PATIENT-LVL III: ICD-10-PCS | Mod: PBBFAC,,, | Performed by: NURSE PRACTITIONER

## 2022-05-17 PROCEDURE — 1160F RVW MEDS BY RX/DR IN RCRD: CPT | Mod: CPTII,S$GLB,, | Performed by: NURSE PRACTITIONER

## 2022-05-17 PROCEDURE — 3078F DIAST BP <80 MM HG: CPT | Mod: CPTII,S$GLB,, | Performed by: NURSE PRACTITIONER

## 2022-05-17 PROCEDURE — 3078F PR MOST RECENT DIASTOLIC BLOOD PRESSURE < 80 MM HG: ICD-10-PCS | Mod: CPTII,S$GLB,, | Performed by: NURSE PRACTITIONER

## 2022-05-17 PROCEDURE — 1159F PR MEDICATION LIST DOCUMENTED IN MEDICAL RECORD: ICD-10-PCS | Mod: CPTII,S$GLB,, | Performed by: NURSE PRACTITIONER

## 2022-05-17 PROCEDURE — 81002 POCT URINE DIPSTICK WITHOUT MICROSCOPE: ICD-10-PCS | Mod: S$GLB,,, | Performed by: NURSE PRACTITIONER

## 2022-05-17 PROCEDURE — 1160F PR REVIEW ALL MEDS BY PRESCRIBER/CLIN PHARMACIST DOCUMENTED: ICD-10-PCS | Mod: CPTII,S$GLB,, | Performed by: NURSE PRACTITIONER

## 2022-05-17 PROCEDURE — 3008F BODY MASS INDEX DOCD: CPT | Mod: CPTII,S$GLB,, | Performed by: NURSE PRACTITIONER

## 2022-05-17 RX ORDER — SILDENAFIL 100 MG/1
100 TABLET, FILM COATED ORAL DAILY PRN
Qty: 10 TABLET | Refills: 12 | Status: SHIPPED | OUTPATIENT
Start: 2022-05-17 | End: 2022-09-20 | Stop reason: ALTCHOICE

## 2022-05-17 RX ORDER — TADALAFIL 20 MG/1
20 TABLET ORAL DAILY PRN
Qty: 10 TABLET | Refills: 12 | Status: SHIPPED | OUTPATIENT
Start: 2022-05-17 | End: 2023-05-22 | Stop reason: SDUPTHER

## 2022-05-17 NOTE — PROGRESS NOTES
CHIEF COMPLAINT:    Alessandro Cerna is a 66 y.o. male presents today for Follow up.     HISTORY OF PRESENTING ILLINESS:    Alessandro Cerna is a 66 y.o. male with a history of BPH and ED.  He has a family history of Prostate Cancer with his father.   He was last seen in clinic 12/18/2020  He was taking Cialis 20mg PRN for ED.     Here today for f/u visit and med refill.  Getting mixed results with the Cialis; no side effects.   FOS varies; nocturia 2-3x.   No dysuria/hematuria.   He states he gets results from the Cialis.    Last PSA was 1.3 on 06/10/2021  Gets PSA's from his PCP; labs next month.         REVIEW OF SYSTEMS:  Review of Systems   Constitutional: Negative.  Negative for chills and fever.   Eyes: Negative for double vision.   Respiratory: Negative for cough and shortness of breath.    Cardiovascular: Negative for chest pain and palpitations.   Gastrointestinal: Negative for nausea and vomiting.   Genitourinary: Negative.  Negative for dysuria, flank pain and hematuria.        (+) LUTS  Nocturia 2-3x.   Ok with urination.      Neurological: Negative for dizziness.         PATIENT HISTORY:    Past Medical History:   Diagnosis Date    Chronic seasonal allergic rhinitis 11/16/2017    GERD (gastroesophageal reflux disease)     TB (pulmonary tuberculosis) 1982    Treated CHNO 9 mo therapy.       Past Surgical History:   Procedure Laterality Date    COLONOSCOPY N/A 8/4/2021    Procedure: COLONOSCOPY   suprep;  Surgeon: Mo Blanco MD;  Location: [facility];  Service: Endoscopy;  Laterality: N/A;  covid test; 08/01/2021(sunday) [facility] 2;00pm                          ANB    COLONOSCOPY W/ POLYPECTOMY  08/04/2021    ESOPHAGOGASTRODUODENOSCOPY N/A 8/4/2021    Procedure: EGD (ESOPHAGOGASTRODUODENOSCOPY);  Surgeon: Mo Blanco MD;  Location: [facility];  Service: Endoscopy;  Laterality: N/A;    ESOPHAGOGASTRODUODENOSCOPY (EGD) WITH DILATION  08/04/2021    IRRIGATION AND  DEBRIDEMENT OF LOWER EXTREMITY Left 2019    Procedure: IRRIGATION AND DEBRIDEMENT, LOWER EXTREMITY Bone biopsy heel;  Surgeon: Karo Marsh MD;  Location: [facility];  Service: Podiatry;  Laterality: Left;       Family History   Problem Relation Age of Onset    Breast cancer Mother     Cancer Mother     Prostate cancer Father     Cancer Father     No Known Problems Sister     No Known Problems Brother     No Known Problems Brother     No Known Problems Brother     Emphysema Neg Hx        Social History     Socioeconomic History    Marital status:    Tobacco Use    Smoking status: Former Smoker     Packs/day: 1.50     Years: 2.00     Pack years: 3.00     Types: Cigarettes     Quit date: 4/3/1977     Years since quittin.1    Smokeless tobacco: Never Used   Substance and Sexual Activity    Alcohol use: Yes     Alcohol/week: 3.0 standard drinks     Types: 3 Cans of beer per week     Comment: one drink/week    Drug use: No    Sexual activity: Yes     Partners: Female   Social History Narrative    Mom had breast cancer, dad had prostate cancer    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 He lives with his wife at home. Has a dog at home. No smokers at home. He has 3 kids; they lives nearby. 2 grandchildren (20 and 14).        Allergies:  Patient has no known allergies.    Medications:    Current Outpatient Medications:     atorvastatin (LIPITOR) 10 MG tablet, Take 1 tablet (10 mg total) by mouth every evening., Disp: 90 tablet, Rfl: 3    ergocalciferol (ERGOCALCIFEROL) 50,000 unit Cap, Take 1 capsule (50,000 Units total) by mouth every 7 days. Then start daily OTC replacement after this Rx is complete, Disp: 12 capsule, Rfl: 2    pantoprazole (PROTONIX) 40 MG tablet, Take 1 tablet (40 mg total) by mouth 2 (two) times daily., Disp: 180 tablet, Rfl: 2    sildenafiL (VIAGRA) 100 MG tablet, Take 1 tablet (100 mg total) by mouth daily as needed for Erectile Dysfunction (take on an empty  stomach 30-60 minutes before)., Disp: 10 tablet, Rfl: 12    tadalafiL (CIALIS) 20 MG Tab, Take 1 tablet (20 mg total) by mouth daily as needed (take 30-60 minutes before on an empty stomach)., Disp: 10 tablet, Rfl: 12    PHYSICAL EXAMINATION:  Physical Exam  Vitals and nursing note reviewed.   Constitutional:       General: He is awake.      Appearance: Normal appearance.   HENT:      Head: Normocephalic.      Right Ear: External ear normal.      Left Ear: External ear normal.      Nose: Nose normal.   Cardiovascular:      Rate and Rhythm: Normal rate.   Pulmonary:      Effort: Pulmonary effort is normal. No respiratory distress.   Abdominal:      Tenderness: There is no abdominal tenderness. There is no right CVA tenderness or left CVA tenderness.   Genitourinary:     Penis: Normal and circumcised. No hypospadias.       Testes: Normal.         Right: Mass, tenderness or swelling not present.         Left: Mass, tenderness or swelling not present.      Prostate: Enlarged. Not tender and no nodules present.      Rectum: Normal.      Comments: Prostate ~30gms; smooth    Musculoskeletal:         General: Normal range of motion.      Cervical back: Normal range of motion.   Lymphadenopathy:      Lower Body: No right inguinal adenopathy. No left inguinal adenopathy.   Skin:     General: Skin is warm and dry.   Neurological:      General: No focal deficit present.      Mental Status: He is alert and oriented to person, place, and time.   Psychiatric:         Mood and Affect: Mood normal.         Behavior: Behavior is cooperative.           LABS:      In office UA today was clear of active infection and blood.          Lab Results   Component Value Date    PSA 1.3 06/10/2021    PSA 1.4 04/03/2020    PSA 1.2 04/05/2019             IMPRESSION:    Encounter Diagnoses   Name Primary?    BPH with urinary obstruction Yes    Erectile dysfunction due to arterial insufficiency          Assessment:       1. BPH with urinary  obstruction    2. Erectile dysfunction due to arterial insufficiency        Plan:         I spent 30 minutes with the patient of which more than half was spent in direct consultation with the patient in regards to our treatment and plan.  We addressed the office findings and recent labs.   Education and recommendations of today's plan of care including home remedies and needed follow up with PCP.   We discussed the LUTS and possible contributory factors.   Discussed medical management, but he is ok for now.  Diet modifications; reducing bladder irritants; healthy diet; benefits of regular exercise.   We discussed his ED and the contributory factors.  Follow up with PCP for underlying medical conditions causing ED.   We discussed the physiological as well as his psychological aspects that contribute to ED.  Reviewed the 1st, 2nd, & 3rd line therapies for ED.  The benefits, expectations risks, se of the different therapies.  Encouraged to take on an empty stomach 30-60 minutes prior to interaction.   He was informed that for the YAKOV we have rep that comes to clinic for him discuss.  If interested in ICI therapy, medication comes from a compound pharmacy and the 1st dose has to be done under medical supervision.  This is done due to high risk for priapism.  Educational materials given.  He would like to try Sildenafil 1st.   Understands not to take with Cialis.

## 2022-06-29 ENCOUNTER — LAB VISIT (OUTPATIENT)
Dept: LAB | Facility: HOSPITAL | Age: 67
End: 2022-06-29
Attending: FAMILY MEDICINE
Payer: COMMERCIAL

## 2022-06-29 DIAGNOSIS — Z79.899 ENCOUNTER FOR MONITORING LONG-TERM PROTON PUMP INHIBITOR THERAPY: ICD-10-CM

## 2022-06-29 DIAGNOSIS — Z51.81 ENCOUNTER FOR MONITORING LONG-TERM PROTON PUMP INHIBITOR THERAPY: ICD-10-CM

## 2022-06-29 DIAGNOSIS — K21.9 GERD WITHOUT ESOPHAGITIS: ICD-10-CM

## 2022-06-29 DIAGNOSIS — Z00.00 VISIT FOR WELL MAN HEALTH CHECK: ICD-10-CM

## 2022-06-29 LAB
25(OH)D3+25(OH)D2 SERPL-MCNC: 26 NG/ML (ref 30–96)
ALBUMIN SERPL BCP-MCNC: 4.5 G/DL (ref 3.5–5.2)
ALP SERPL-CCNC: 83 U/L (ref 55–135)
ALT SERPL W/O P-5'-P-CCNC: 14 U/L (ref 10–44)
ANION GAP SERPL CALC-SCNC: 6 MMOL/L (ref 8–16)
AST SERPL-CCNC: 21 U/L (ref 10–40)
BASOPHILS # BLD AUTO: 0.04 K/UL (ref 0–0.2)
BASOPHILS NFR BLD: 0.7 % (ref 0–1.9)
BILIRUB SERPL-MCNC: 1.7 MG/DL (ref 0.1–1)
BUN SERPL-MCNC: 18 MG/DL (ref 8–23)
CALCIUM SERPL-MCNC: 9.6 MG/DL (ref 8.7–10.5)
CHLORIDE SERPL-SCNC: 105 MMOL/L (ref 95–110)
CHOLEST SERPL-MCNC: 181 MG/DL (ref 120–199)
CHOLEST/HDLC SERPL: 5 {RATIO} (ref 2–5)
CO2 SERPL-SCNC: 26 MMOL/L (ref 23–29)
COMPLEXED PSA SERPL-MCNC: 1.9 NG/ML (ref 0–4)
CREAT SERPL-MCNC: 1 MG/DL (ref 0.5–1.4)
DIFFERENTIAL METHOD: ABNORMAL
EOSINOPHIL # BLD AUTO: 0.1 K/UL (ref 0–0.5)
EOSINOPHIL NFR BLD: 1.2 % (ref 0–8)
ERYTHROCYTE [DISTWIDTH] IN BLOOD BY AUTOMATED COUNT: 13.5 % (ref 11.5–14.5)
EST. GFR  (AFRICAN AMERICAN): >60 ML/MIN/1.73 M^2
EST. GFR  (NON AFRICAN AMERICAN): >60 ML/MIN/1.73 M^2
ESTIMATED AVG GLUCOSE: 100 MG/DL (ref 68–131)
FERRITIN SERPL-MCNC: 208 NG/ML (ref 20–300)
GLUCOSE SERPL-MCNC: 103 MG/DL (ref 70–110)
HBA1C MFR BLD: 5.1 % (ref 4–5.6)
HCT VFR BLD AUTO: 44.6 % (ref 40–54)
HDLC SERPL-MCNC: 36 MG/DL (ref 40–75)
HDLC SERPL: 19.9 % (ref 20–50)
HGB BLD-MCNC: 14.7 G/DL (ref 14–18)
IMM GRANULOCYTES # BLD AUTO: 0.01 K/UL (ref 0–0.04)
IMM GRANULOCYTES NFR BLD AUTO: 0.2 % (ref 0–0.5)
IRON SERPL-MCNC: 119 UG/DL (ref 45–160)
LDLC SERPL CALC-MCNC: 121.8 MG/DL (ref 63–159)
LYMPHOCYTES # BLD AUTO: 2.5 K/UL (ref 1–4.8)
LYMPHOCYTES NFR BLD: 43.6 % (ref 18–48)
MAGNESIUM SERPL-MCNC: 2.1 MG/DL (ref 1.6–2.6)
MCH RBC QN AUTO: 32.5 PG (ref 27–31)
MCHC RBC AUTO-ENTMCNC: 33 G/DL (ref 32–36)
MCV RBC AUTO: 99 FL (ref 82–98)
MONOCYTES # BLD AUTO: 0.4 K/UL (ref 0.3–1)
MONOCYTES NFR BLD: 6.9 % (ref 4–15)
NEUTROPHILS # BLD AUTO: 2.7 K/UL (ref 1.8–7.7)
NEUTROPHILS NFR BLD: 47.4 % (ref 38–73)
NONHDLC SERPL-MCNC: 145 MG/DL
NRBC BLD-RTO: 0 /100 WBC
PLATELET # BLD AUTO: 245 K/UL (ref 150–450)
PMV BLD AUTO: 9.7 FL (ref 9.2–12.9)
POTASSIUM SERPL-SCNC: 5.1 MMOL/L (ref 3.5–5.1)
PROT SERPL-MCNC: 7.8 G/DL (ref 6–8.4)
RBC # BLD AUTO: 4.53 M/UL (ref 4.6–6.2)
SATURATED IRON: 32 % (ref 20–50)
SODIUM SERPL-SCNC: 137 MMOL/L (ref 136–145)
TOTAL IRON BINDING CAPACITY: 376 UG/DL (ref 250–450)
TRANSFERRIN SERPL-MCNC: 254 MG/DL (ref 200–375)
TRIGL SERPL-MCNC: 116 MG/DL (ref 30–150)
TSH SERPL DL<=0.005 MIU/L-ACNC: 1.23 UIU/ML (ref 0.4–4)
VIT B12 SERPL-MCNC: 249 PG/ML (ref 210–950)
WBC # BLD AUTO: 5.66 K/UL (ref 3.9–12.7)

## 2022-06-29 PROCEDURE — 80053 COMPREHEN METABOLIC PANEL: CPT | Performed by: FAMILY MEDICINE

## 2022-06-29 PROCEDURE — 82306 VITAMIN D 25 HYDROXY: CPT | Performed by: FAMILY MEDICINE

## 2022-06-29 PROCEDURE — 85025 COMPLETE CBC W/AUTO DIFF WBC: CPT | Performed by: FAMILY MEDICINE

## 2022-06-29 PROCEDURE — 83735 ASSAY OF MAGNESIUM: CPT | Performed by: FAMILY MEDICINE

## 2022-06-29 PROCEDURE — 80061 LIPID PANEL: CPT | Performed by: FAMILY MEDICINE

## 2022-06-29 PROCEDURE — 82728 ASSAY OF FERRITIN: CPT | Performed by: FAMILY MEDICINE

## 2022-06-29 PROCEDURE — 84443 ASSAY THYROID STIM HORMONE: CPT | Performed by: FAMILY MEDICINE

## 2022-06-29 PROCEDURE — 82607 VITAMIN B-12: CPT | Performed by: FAMILY MEDICINE

## 2022-06-29 PROCEDURE — 84466 ASSAY OF TRANSFERRIN: CPT | Performed by: FAMILY MEDICINE

## 2022-06-29 PROCEDURE — 36415 COLL VENOUS BLD VENIPUNCTURE: CPT | Mod: PO | Performed by: FAMILY MEDICINE

## 2022-06-29 PROCEDURE — 84153 ASSAY OF PSA TOTAL: CPT | Performed by: FAMILY MEDICINE

## 2022-06-29 PROCEDURE — 83036 HEMOGLOBIN GLYCOSYLATED A1C: CPT | Performed by: FAMILY MEDICINE

## 2022-07-08 ENCOUNTER — OFFICE VISIT (OUTPATIENT)
Dept: FAMILY MEDICINE | Facility: CLINIC | Age: 67
End: 2022-07-08
Payer: COMMERCIAL

## 2022-07-08 VITALS
WEIGHT: 175.69 LBS | TEMPERATURE: 98 F | DIASTOLIC BLOOD PRESSURE: 70 MMHG | OXYGEN SATURATION: 98 % | SYSTOLIC BLOOD PRESSURE: 112 MMHG | HEIGHT: 67 IN | BODY MASS INDEX: 27.57 KG/M2 | HEART RATE: 62 BPM

## 2022-07-08 DIAGNOSIS — Z13.6 ENCOUNTER FOR ABDOMINAL AORTIC ANEURYSM (AAA) SCREENING: ICD-10-CM

## 2022-07-08 DIAGNOSIS — E53.8 B12 DEFICIENCY: ICD-10-CM

## 2022-07-08 DIAGNOSIS — Z91.89 10 YEAR RISK OF MI OR STROKE 7.5% OR GREATER: ICD-10-CM

## 2022-07-08 DIAGNOSIS — K22.2 LOWER ESOPHAGEAL RING (SCHATZKI): ICD-10-CM

## 2022-07-08 DIAGNOSIS — K64.0 GRADE I HEMORRHOIDS: ICD-10-CM

## 2022-07-08 DIAGNOSIS — E78.49 OTHER HYPERLIPIDEMIA: ICD-10-CM

## 2022-07-08 DIAGNOSIS — E55.9 VITAMIN D DEFICIENCY: ICD-10-CM

## 2022-07-08 DIAGNOSIS — Z00.00 VISIT FOR WELL MAN HEALTH CHECK: Primary | ICD-10-CM

## 2022-07-08 DIAGNOSIS — K21.9 GERD WITHOUT ESOPHAGITIS: ICD-10-CM

## 2022-07-08 PROBLEM — Z12.11 SCREEN FOR COLON CANCER: Status: RESOLVED | Noted: 2021-08-04 | Resolved: 2022-07-08

## 2022-07-08 PROBLEM — L08.9 SOFT TISSUE INFECTION: Status: RESOLVED | Noted: 2019-12-26 | Resolved: 2022-07-08

## 2022-07-08 PROCEDURE — 3044F HG A1C LEVEL LT 7.0%: CPT | Mod: CPTII,S$GLB,, | Performed by: FAMILY MEDICINE

## 2022-07-08 PROCEDURE — 1159F PR MEDICATION LIST DOCUMENTED IN MEDICAL RECORD: ICD-10-PCS | Mod: CPTII,S$GLB,, | Performed by: FAMILY MEDICINE

## 2022-07-08 PROCEDURE — 3044F PR MOST RECENT HEMOGLOBIN A1C LEVEL <7.0%: ICD-10-PCS | Mod: CPTII,S$GLB,, | Performed by: FAMILY MEDICINE

## 2022-07-08 PROCEDURE — 99999 PR PBB SHADOW E&M-EST. PATIENT-LVL IV: CPT | Mod: PBBFAC,,, | Performed by: FAMILY MEDICINE

## 2022-07-08 PROCEDURE — 3078F DIAST BP <80 MM HG: CPT | Mod: CPTII,S$GLB,, | Performed by: FAMILY MEDICINE

## 2022-07-08 PROCEDURE — 3008F PR BODY MASS INDEX (BMI) DOCUMENTED: ICD-10-PCS | Mod: CPTII,S$GLB,, | Performed by: FAMILY MEDICINE

## 2022-07-08 PROCEDURE — 99999 PR PBB SHADOW E&M-EST. PATIENT-LVL IV: ICD-10-PCS | Mod: PBBFAC,,, | Performed by: FAMILY MEDICINE

## 2022-07-08 PROCEDURE — 99397 PR PREVENTIVE VISIT,EST,65 & OVER: ICD-10-PCS | Mod: S$GLB,,, | Performed by: FAMILY MEDICINE

## 2022-07-08 PROCEDURE — 3074F SYST BP LT 130 MM HG: CPT | Mod: CPTII,S$GLB,, | Performed by: FAMILY MEDICINE

## 2022-07-08 PROCEDURE — 99397 PER PM REEVAL EST PAT 65+ YR: CPT | Mod: S$GLB,,, | Performed by: FAMILY MEDICINE

## 2022-07-08 PROCEDURE — 3008F BODY MASS INDEX DOCD: CPT | Mod: CPTII,S$GLB,, | Performed by: FAMILY MEDICINE

## 2022-07-08 PROCEDURE — 1160F PR REVIEW ALL MEDS BY PRESCRIBER/CLIN PHARMACIST DOCUMENTED: ICD-10-PCS | Mod: CPTII,S$GLB,, | Performed by: FAMILY MEDICINE

## 2022-07-08 PROCEDURE — 3078F PR MOST RECENT DIASTOLIC BLOOD PRESSURE < 80 MM HG: ICD-10-PCS | Mod: CPTII,S$GLB,, | Performed by: FAMILY MEDICINE

## 2022-07-08 PROCEDURE — 1159F MED LIST DOCD IN RCRD: CPT | Mod: CPTII,S$GLB,, | Performed by: FAMILY MEDICINE

## 2022-07-08 PROCEDURE — 1160F RVW MEDS BY RX/DR IN RCRD: CPT | Mod: CPTII,S$GLB,, | Performed by: FAMILY MEDICINE

## 2022-07-08 PROCEDURE — 3074F PR MOST RECENT SYSTOLIC BLOOD PRESSURE < 130 MM HG: ICD-10-PCS | Mod: CPTII,S$GLB,, | Performed by: FAMILY MEDICINE

## 2022-07-08 RX ORDER — ATORVASTATIN CALCIUM 10 MG/1
10 TABLET, FILM COATED ORAL NIGHTLY
Qty: 90 TABLET | Refills: 3 | Status: SHIPPED | OUTPATIENT
Start: 2022-07-08 | End: 2023-05-22

## 2022-07-08 RX ORDER — ATORVASTATIN CALCIUM 10 MG/1
10 TABLET, FILM COATED ORAL NIGHTLY
Qty: 90 TABLET | Refills: 3 | Status: SHIPPED | OUTPATIENT
Start: 2022-07-08 | End: 2022-07-08 | Stop reason: SDUPTHER

## 2022-07-08 RX ORDER — PANTOPRAZOLE SODIUM 40 MG/1
40 TABLET, DELAYED RELEASE ORAL 2 TIMES DAILY
Qty: 180 TABLET | Refills: 2 | Status: SHIPPED | OUTPATIENT
Start: 2022-07-08 | End: 2022-07-08 | Stop reason: SDUPTHER

## 2022-07-08 RX ORDER — ERGOCALCIFEROL 1.25 MG/1
50000 CAPSULE ORAL
Qty: 12 CAPSULE | Refills: 2 | Status: SHIPPED | OUTPATIENT
Start: 2022-07-08 | End: 2022-07-08 | Stop reason: SDUPTHER

## 2022-07-08 RX ORDER — PANTOPRAZOLE SODIUM 40 MG/1
40 TABLET, DELAYED RELEASE ORAL 2 TIMES DAILY
Qty: 180 TABLET | Refills: 3 | Status: SHIPPED | OUTPATIENT
Start: 2022-07-08 | End: 2023-07-14

## 2022-07-08 RX ORDER — ERGOCALCIFEROL 1.25 MG/1
50000 CAPSULE ORAL
Qty: 12 CAPSULE | Refills: 3 | Status: SHIPPED | OUTPATIENT
Start: 2022-07-08 | End: 2023-07-14 | Stop reason: SDUPTHER

## 2022-07-08 NOTE — PROGRESS NOTES
Subjective:       Patient ID: Alessandro Cerna is a 67 y.o. male.    Chief Complaint: Annual Exam      Alessandro Cerna is a 67 y.o. male who presents today  for an annual exam    Exercise: walks at work.     Labs: ordered    C-scope: Last Colonoscopy completed on 8/4/2021     BRBPR: saw colorectal. Has hemorrhoid. Rare bleeding. Doesn't want surgery. Not eating fiber, like he should be.   Israel Galindo: had EGD, 2021. Saw GI. Swallowing has improved.   GERD: no issues. On PPI.   h pyori: completed quad therapy. Test for cure was negative.   BP: stable.   DLD: on statin. NOT taking  b12 def: likely due to PPI. Noted 7/2022.   Vitamin D: improving, but still low.     PMHx: reviewed in EMR and updated  Meds: reviewed in EMR and updated  Shx: reviewed in EMR and updated  FMHx: no family history of colon cancer, breast cancer, ovarian cancer  Social: patient has a cleaning company. He lives with his wife at home. Has a dog at home. No smokers at home. He has 3 kids; they lives nearby. 2 grandchildren (20 and 14).       Review of Systems   Constitutional: Negative for chills and fever.   HENT: Negative for trouble swallowing.    Respiratory: Negative for shortness of breath.    Cardiovascular: Negative for chest pain.   Gastrointestinal: Positive for constipation. Negative for diarrhea, nausea and vomiting.   Neurological: Negative for dizziness, light-headedness and headaches.         Health Maintenance Due   Topic Date Due    Shingles Vaccine (1 of 2) Never done    TETANUS VACCINE  10/25/2015    COVID-19 Vaccine (3 - Booster for Moderna series) 09/13/2021    Abdominal Aortic Aneurysm Screening  Never done     Immunization History   Administered Date(s) Administered    COVID-19, MRNA, LN-S, PF (MODERNA FULL 0.5 ML DOSE) 03/16/2021, 04/13/2021    Influenza - Quadrivalent - PF *Preferred* (6 months and older) 12/13/2021    Pneumococcal Polysaccharide - 23 Valent 06/11/2014    Td (ADULT) 10/25/2005        Objective:     Vitals:    07/08/22 1012   BP: 112/70   Pulse: 62   Temp: 98 °F (36.7 °C)        Physical Exam  Vitals and nursing note reviewed.   Constitutional:       General: He is not in acute distress.     Appearance: He is well-developed. He is not ill-appearing, toxic-appearing or diaphoretic.   HENT:      Head: Normocephalic and atraumatic.   Eyes:      Conjunctiva/sclera: Conjunctivae normal.   Cardiovascular:      Rate and Rhythm: Normal rate and regular rhythm.   Pulmonary:      Effort: Pulmonary effort is normal.      Breath sounds: Normal breath sounds.   Abdominal:      Palpations: Abdomen is soft.      Tenderness: There is no abdominal tenderness.   Genitourinary:     Comments: deferred  Skin:     Findings: No rash.   Neurological:      Mental Status: He is alert and oriented to person, place, and time.   Psychiatric:         Behavior: Behavior normal.         Thought Content: Thought content normal.         Judgment: Judgment normal.         Assessment:       1. Visit for [facility] health check    2. 10 year risk of MI or stroke 7.5% or greater    3. Other hyperlipidemia    4. GERD without esophagitis    5. Lower esophageal ring (Schatzki)    6. Grade I hemorrhoids    7. Vitamin D deficiency    8. B12 deficiency    9. Encounter for abdominal aortic aneurysm (AAA) screening        Plan:       You have low B12. Goal >400. Start daily OTC B12 supplementation at 1000 mcg   You have low vitamin D and a Rx has been sent to your pharmacy. Take this pill once a week and when the prescription and refills are done, start taking an OTC vitamin D supplementation at 1000 IU daily.     Take atorvastatin CONSISTENTLY    Continue PPI    Consider hemorrhoid surgery and/or repeat EGD if symptoms worsen.     Declined PNA vaccine.     US AAA today.     F/u 1 year.     Visit for [facility] man health check    10 year risk of MI or stroke 7.5% or greater  -     Discontinue: atorvastatin (LIPITOR) 10 MG tablet; Take 1  tablet (10 mg total) by mouth every evening.  Dispense: 90 tablet; Refill: 3  -     atorvastatin (LIPITOR) 10 MG tablet; Take 1 tablet (10 mg total) by mouth every evening.  Dispense: 90 tablet; Refill: 3    Other hyperlipidemia  -     Discontinue: atorvastatin (LIPITOR) 10 MG tablet; Take 1 tablet (10 mg total) by mouth every evening.  Dispense: 90 tablet; Refill: 3  -     atorvastatin (LIPITOR) 10 MG tablet; Take 1 tablet (10 mg total) by mouth every evening.  Dispense: 90 tablet; Refill: 3    GERD without esophagitis  -     Discontinue: pantoprazole (PROTONIX) 40 MG tablet; Take 1 tablet (40 mg total) by mouth 2 (two) times daily.  Dispense: 180 tablet; Refill: 2  -     pantoprazole (PROTONIX) 40 MG tablet; Take 1 tablet (40 mg total) by mouth 2 (two) times daily.  Dispense: 180 tablet; Refill: 3    Lower esophageal ring (Schatzki)    Grade I hemorrhoids    Vitamin D deficiency  -     Discontinue: ergocalciferol (ERGOCALCIFEROL) 50,000 unit Cap; Take 1 capsule (50,000 Units total) by mouth every 7 days. Then start daily OTC replacement after this Rx is complete  Dispense: 12 capsule; Refill: 2  -     ergocalciferol (ERGOCALCIFEROL) 50,000 unit Cap; Take 1 capsule (50,000 Units total) by mouth every 7 days. Then start daily OTC replacement after this Rx is complete  Dispense: 12 capsule; Refill: 3    B12 deficiency    Encounter for abdominal aortic aneurysm (AAA) screening  -     US Abdominal Aorta; Future; Expected date: 07/08/2022

## 2022-07-08 NOTE — PATIENT INSTRUCTIONS
?Avoid tobacco  ?Be physically active  ?Maintain a healthy weight  ?Eat a diet rich in fruits, vegetables, and whole grains, and low in saturated/trans fat  ?Limit alcohol consumption  ?Protect against sexually transmitted infections  ?Avoid excess sun  RTC as directed during the visit, as needed or in 1 year for a physical with labs prior. Call one month prior to the physical to schedule apt and labs.     Due to the 21st Century Cures Act, Federal Law mandates that ALL results will be released immediately. This means that you will get your results on [employer] prior to me having a chance to review them. Please give our office 3 business days to review your results   I am not in clinic Tuesday Afternoon or Friday Afternoon and this will also delay your review.      You have low B12. Goal >400. Start daily OTC B12 supplementation at 1000 mcg   You have low vitamin D and a Rx has been sent to your pharmacy. Take this pill once a week and when the prescription and refills are done, start taking an OTC vitamin D supplementation at 1000 IU daily.     Take atorvastatin CONSISTENTLY    Continue PPI    Consider hemorrhoid surgery and/or repeat EGD if symptoms worsen.     Declined PNA vaccine.     US AAA today.     F/u 1 year.

## 2022-09-16 ENCOUNTER — TELEPHONE (OUTPATIENT)
Dept: FAMILY MEDICINE | Facility: CLINIC | Age: 67
End: 2022-09-16
Payer: MEDICARE

## 2022-09-16 NOTE — TELEPHONE ENCOUNTER
----- Message from Elmer Xiao sent at 9/16/2022 10:54 AM CDT -----  Contact: pt  .Type:  Same Day Appointment Request    Caller is requesting a same day appointment.  Caller declined first available appointment listed below.    Name of Caller:pt  When is the first available appointment?Dannie  Symptoms:not feeling well   Best Call Back Number:[phone]  Additional Information:

## 2022-09-20 ENCOUNTER — LAB VISIT (OUTPATIENT)
Dept: LAB | Facility: HOSPITAL | Age: 67
End: 2022-09-20
Attending: NURSE PRACTITIONER
Payer: MEDICARE

## 2022-09-20 ENCOUNTER — OFFICE VISIT (OUTPATIENT)
Dept: FAMILY MEDICINE | Facility: CLINIC | Age: 67
End: 2022-09-20
Payer: MEDICARE

## 2022-09-20 ENCOUNTER — TELEPHONE (OUTPATIENT)
Dept: FAMILY MEDICINE | Facility: CLINIC | Age: 67
End: 2022-09-20

## 2022-09-20 VITALS
HEIGHT: 67 IN | DIASTOLIC BLOOD PRESSURE: 72 MMHG | WEIGHT: 182.31 LBS | HEART RATE: 77 BPM | SYSTOLIC BLOOD PRESSURE: 128 MMHG | OXYGEN SATURATION: 98 % | BODY MASS INDEX: 28.61 KG/M2

## 2022-09-20 DIAGNOSIS — Z11.3 SCREENING FOR STD (SEXUALLY TRANSMITTED DISEASE): ICD-10-CM

## 2022-09-20 DIAGNOSIS — R39.9 UTI SYMPTOMS: Primary | ICD-10-CM

## 2022-09-20 DIAGNOSIS — R39.9 UTI SYMPTOMS: ICD-10-CM

## 2022-09-20 LAB
BILIRUB UR QL STRIP: NEGATIVE
CLARITY UR: CLEAR
COLOR UR: YELLOW
GLUCOSE UR QL STRIP: NEGATIVE
HGB UR QL STRIP: ABNORMAL
KETONES UR QL STRIP: NEGATIVE
LEUKOCYTE ESTERASE UR QL STRIP: NEGATIVE
NITRITE UR QL STRIP: NEGATIVE
PH UR STRIP: 6 [PH] (ref 5–8)
PROT UR QL STRIP: NEGATIVE
SP GR UR STRIP: 1.01 (ref 1–1.03)
URN SPEC COLLECT METH UR: ABNORMAL
UROBILINOGEN UR STRIP-ACNC: NEGATIVE EU/DL

## 2022-09-20 PROCEDURE — 81002 URINALYSIS NONAUTO W/O SCOPE: CPT | Mod: PO | Performed by: NURSE PRACTITIONER

## 2022-09-20 PROCEDURE — 87491 CHLMYD TRACH DNA AMP PROBE: CPT | Performed by: NURSE PRACTITIONER

## 2022-09-20 PROCEDURE — 99999 PR PBB SHADOW E&M-EST. PATIENT-LVL IV: CPT | Mod: PBBFAC,,, | Performed by: NURSE PRACTITIONER

## 2022-09-20 PROCEDURE — 99214 OFFICE O/P EST MOD 30 MIN: CPT | Mod: PBBFAC,PO | Performed by: NURSE PRACTITIONER

## 2022-09-20 PROCEDURE — 87086 URINE CULTURE/COLONY COUNT: CPT | Performed by: NURSE PRACTITIONER

## 2022-09-20 PROCEDURE — 99213 OFFICE O/P EST LOW 20 MIN: CPT | Mod: S$PBB,,, | Performed by: NURSE PRACTITIONER

## 2022-09-20 PROCEDURE — 99213 PR OFFICE/OUTPT VISIT, EST, LEVL III, 20-29 MIN: ICD-10-PCS | Mod: S$PBB,,, | Performed by: NURSE PRACTITIONER

## 2022-09-20 PROCEDURE — 99999 PR PBB SHADOW E&M-EST. PATIENT-LVL IV: ICD-10-PCS | Mod: PBBFAC,,, | Performed by: NURSE PRACTITIONER

## 2022-09-20 PROCEDURE — 87591 N.GONORRHOEAE DNA AMP PROB: CPT | Performed by: NURSE PRACTITIONER

## 2022-09-20 RX ORDER — SULFAMETHOXAZOLE AND TRIMETHOPRIM 800; 160 MG/1; MG/1
1 TABLET ORAL 2 TIMES DAILY
Qty: 14 TABLET | Refills: 0 | Status: SHIPPED | OUTPATIENT
Start: 2022-09-20 | End: 2022-09-27

## 2022-09-20 NOTE — PROGRESS NOTES
"Subjective:       Patient ID: Alessandro Cerna is a 67 y.o. male.    Chief Complaint: Urinary Tract Infection    This is a 68 yo  male patient of Dr. Cabrera who is new to me. He presents today with c/o UTI symptoms. PMH includes    Patient Active Problem List:     Late effects of respiratory tuberculosis     Bilateral hip pain     Bilateral shoulder pain     Erectile dysfunction     Chronic seasonal allergic rhinitis     Cellulitis of left foot     Edema of left foot     GERD without esophagitis     BMI 27.0-27.9,adult     Other hyperlipidemia     10 year risk of MI or stroke 7.5% or greater     Vitamin D deficiency     Lower esophageal ring (Schatzki)     Grade I hemorrhoids     B12 deficiency    VSS today. Experiencing UTI symptoms that include urinary frequency/urgency, dark malodorous urine, and right-sided flank pain x past week that has not improved since onset. Denies fever, chills, abdominal pain, hematuria, penile discharge, rash, penile/testicular pain or N/V. Suffers with chronic, intermittent constipation and occasional anal bleeding d/t hemorrhoids. Admits he is not eating much fiber. Drinks about 3-4 bottles of water daily. Drinks alcohol socially. Drinks at least 2 cups of coffee daily. When asked about concerns for STDs, he said he "travels from one place to another"; agrees to STD testing. No longer using Viagra, still using Cialis. Last PSA test normal. No other issues or complaints today.    Review of Systems   Constitutional:  Negative for chills and fever.   Gastrointestinal:  Positive for anal bleeding (d/t hemorrhoids, intermittent) and constipation (intermittent). Negative for abdominal pain, diarrhea, nausea and vomiting.   Genitourinary:  Positive for flank pain, frequency and urgency. Negative for bladder incontinence, difficulty urinating, discharge, dysuria, genital sores, hematuria, penile pain, penile swelling, scrotal swelling and testicular pain.   Musculoskeletal:  " Negative for gait problem and myalgias.   Integumentary:  Negative for rash and mole/lesion.       Objective:      Physical Exam  Vitals reviewed.   Constitutional:       General: He is awake. He is not in acute distress.     Appearance: Normal appearance. He is well-developed, well-groomed and overweight.   HENT:      Head: Normocephalic.   Cardiovascular:      Rate and Rhythm: Normal rate and regular rhythm.      Heart sounds: No murmur heard.  Pulmonary:      Effort: Pulmonary effort is normal. No respiratory distress.      Breath sounds: Normal breath sounds. No wheezing or rhonchi.   Abdominal:      General: Bowel sounds are normal. There is no distension.      Palpations: Abdomen is soft.      Tenderness: There is no abdominal tenderness. There is no guarding or rebound.   Skin:     General: Skin is warm and dry.      Coloration: Skin is not pale.   Neurological:      Mental Status: He is alert and oriented to person, place, and time.      Coordination: Coordination normal.      Gait: Gait normal.   Psychiatric:         Attention and Perception: Attention normal.         Mood and Affect: Mood and affect normal.         Speech: Speech normal.         Behavior: Behavior normal. Behavior is cooperative.       Assessment:       Problem List Items Addressed This Visit    None  Visit Diagnoses       UTI symptoms    -  Primary    Relevant Medications    sulfamethoxazole-trimethoprim 800-160mg (BACTRIM DS) 800-160 mg Tab    Other Relevant Orders    Urinalysis    Urine culture    Screening for STD (sexually transmitted disease)        Relevant Orders    RPR    HIV 1/2 Ag/Ab (4th Gen)    C. trachomatis/N. gonorrhoeae by AMP DNA    Hepatitis C Antibody    Hepatitis B Surface Antigen              Plan:       Alessandro was seen today for urinary tract infection.    Diagnoses and all orders for this visit:    UTI symptoms  -     Urinalysis; Future  -     Urine culture; Future  -     sulfamethoxazole-trimethoprim 800-160mg  (BACTRIM DS) 800-160 mg Tab; Take 1 tablet by mouth 2 (two) times daily. for 7 days    Screening for STD (sexually transmitted disease)  -     RPR; Future  -     HIV 1/2 Ag/Ab (4th Gen); Future  -     C. trachomatis/N. gonorrhoeae by AMP DNA; Future  -     Hepatitis C Antibody; Future  -     Hepatitis B Surface Antigen; Future        - Urine tests ordered today  - Will treat empirically with Bactrim BID x 7 days  - Drink plenty water and reduce caffeine intake  - Advised to eat more fiber daily  - STD screening done per request  - Warning signs discussed  - RTC as needed          I spent a total of 25 minutes on the day of the visit.  This includes face to face time and non-face to face time preparing to see the patient (eg, review of tests), obtaining and/or reviewing separately obtained history, documenting clinical information in the electronic or other health record, independently interpreting results and communicating results to the patient/family/caregiver, or care coordinator.

## 2022-09-21 ENCOUNTER — LAB VISIT (OUTPATIENT)
Dept: LAB | Facility: HOSPITAL | Age: 67
End: 2022-09-21
Attending: NURSE PRACTITIONER
Payer: MEDICARE

## 2022-09-21 DIAGNOSIS — Z11.3 SCREENING FOR STD (SEXUALLY TRANSMITTED DISEASE): ICD-10-CM

## 2022-09-21 LAB
BACTERIA UR CULT: NO GROWTH
C TRACH DNA SPEC QL NAA+PROBE: NOT DETECTED
HBV SURFACE AG SERPL QL IA: NORMAL
HCV AB SERPL QL IA: NORMAL
HIV 1+2 AB+HIV1 P24 AG SERPL QL IA: NORMAL
N GONORRHOEA DNA SPEC QL NAA+PROBE: NOT DETECTED

## 2022-09-21 PROCEDURE — 87340 HEPATITIS B SURFACE AG IA: CPT | Performed by: NURSE PRACTITIONER

## 2022-09-21 PROCEDURE — 86803 HEPATITIS C AB TEST: CPT | Performed by: NURSE PRACTITIONER

## 2022-09-21 PROCEDURE — 87389 HIV-1 AG W/HIV-1&-2 AB AG IA: CPT | Performed by: NURSE PRACTITIONER

## 2022-09-21 PROCEDURE — 86592 SYPHILIS TEST NON-TREP QUAL: CPT | Performed by: NURSE PRACTITIONER

## 2022-09-21 PROCEDURE — 36415 COLL VENOUS BLD VENIPUNCTURE: CPT | Mod: PO | Performed by: NURSE PRACTITIONER

## 2022-09-22 LAB — RPR SER QL: NORMAL

## 2022-10-31 ENCOUNTER — OFFICE VISIT (OUTPATIENT)
Dept: INTERNAL MEDICINE | Facility: CLINIC | Age: 67
End: 2022-10-31
Payer: MEDICARE

## 2022-10-31 VITALS
SYSTOLIC BLOOD PRESSURE: 106 MMHG | HEART RATE: 71 BPM | OXYGEN SATURATION: 96 % | WEIGHT: 182.75 LBS | HEIGHT: 67 IN | BODY MASS INDEX: 28.68 KG/M2 | TEMPERATURE: 98 F | DIASTOLIC BLOOD PRESSURE: 74 MMHG | RESPIRATION RATE: 18 BRPM

## 2022-10-31 DIAGNOSIS — R25.2 MUSCLE CRAMPS: Primary | ICD-10-CM

## 2022-10-31 PROCEDURE — 99999 PR PBB SHADOW E&M-EST. PATIENT-LVL III: ICD-10-PCS | Mod: PBBFAC,,, | Performed by: STUDENT IN AN ORGANIZED HEALTH CARE EDUCATION/TRAINING PROGRAM

## 2022-10-31 PROCEDURE — 99999 PR PBB SHADOW E&M-EST. PATIENT-LVL III: CPT | Mod: PBBFAC,,, | Performed by: STUDENT IN AN ORGANIZED HEALTH CARE EDUCATION/TRAINING PROGRAM

## 2022-10-31 PROCEDURE — 99214 OFFICE O/P EST MOD 30 MIN: CPT | Mod: S$PBB,,, | Performed by: STUDENT IN AN ORGANIZED HEALTH CARE EDUCATION/TRAINING PROGRAM

## 2022-10-31 PROCEDURE — 99214 PR OFFICE/OUTPT VISIT, EST, LEVL IV, 30-39 MIN: ICD-10-PCS | Mod: S$PBB,,, | Performed by: STUDENT IN AN ORGANIZED HEALTH CARE EDUCATION/TRAINING PROGRAM

## 2022-10-31 PROCEDURE — 99213 OFFICE O/P EST LOW 20 MIN: CPT | Mod: PBBFAC,PO | Performed by: STUDENT IN AN ORGANIZED HEALTH CARE EDUCATION/TRAINING PROGRAM

## 2022-10-31 RX ORDER — VITAMIN B COMPLEX
1 CAPSULE ORAL DAILY
COMMUNITY

## 2022-10-31 RX ORDER — CYCLOBENZAPRINE HCL 5 MG
5 TABLET ORAL 3 TIMES DAILY PRN
Qty: 90 TABLET | Refills: 0 | Status: SHIPPED | OUTPATIENT
Start: 2022-10-31 | End: 2022-11-10

## 2022-10-31 RX ORDER — CIPROFLOXACIN 500 MG/1
500 TABLET ORAL 2 TIMES DAILY
COMMUNITY
Start: 2022-10-12 | End: 2023-05-22 | Stop reason: ALTCHOICE

## 2022-11-03 ENCOUNTER — OCCUPATIONAL HEALTH (OUTPATIENT)
Dept: URGENT CARE | Facility: CLINIC | Age: 67
End: 2022-11-03
Payer: MEDICARE

## 2022-11-03 DIAGNOSIS — Z23 ENCOUNTER FOR IMMUNIZATION: Primary | ICD-10-CM

## 2022-11-03 PROCEDURE — 90694 VACC AIIV4 NO PRSRV 0.5ML IM: CPT | Mod: S$GLB,,,

## 2022-11-03 PROCEDURE — 90694 FLU VACCINE - QUADRIVALENT - ADJUVANTED: ICD-10-PCS | Mod: S$GLB,,,

## 2023-02-03 ENCOUNTER — TELEPHONE (OUTPATIENT)
Dept: FAMILY MEDICINE | Facility: CLINIC | Age: 68
End: 2023-02-03
Payer: MEDICARE

## 2023-02-14 ENCOUNTER — TELEPHONE (OUTPATIENT)
Dept: GASTROENTEROLOGY | Facility: CLINIC | Age: 68
End: 2023-02-14

## 2023-02-14 NOTE — TELEPHONE ENCOUNTER
Patient has c/o stomach gurgling and blood in stool. I infromed patient that Dr. Blanco is booked until April. Patient would like to see someone sooner. Appt scheduled with Monisha Zuniga NP on 2/17/23 at 8:40am.

## 2023-02-14 NOTE — TELEPHONE ENCOUNTER
----- Message from Crystal Zamudio sent at 2/14/2023 10:19 AM CST -----  .Type:  Sooner Appointment Request    Caller is requesting a sooner appointment.  Caller declined first available appointment listed below.  Caller will not accept being placed on the waitlist and is requesting a message be sent to doctor.  Name of Caller:pt  When is the first available appointment?7/10  Symptoms:upset stomach   Would the patient rather a call back or a response via [facility]? call  Best Call Back Number:[phone]  Additional Information:

## 2023-02-17 ENCOUNTER — LAB VISIT (OUTPATIENT)
Dept: LAB | Facility: HOSPITAL | Age: 68
End: 2023-02-17
Payer: MEDICARE

## 2023-02-17 ENCOUNTER — OFFICE VISIT (OUTPATIENT)
Dept: GASTROENTEROLOGY | Facility: CLINIC | Age: 68
End: 2023-02-17
Payer: MEDICARE

## 2023-02-17 VITALS — HEIGHT: 67 IN | WEIGHT: 175.69 LBS | BODY MASS INDEX: 27.57 KG/M2

## 2023-02-17 DIAGNOSIS — R53.83 FATIGUE, UNSPECIFIED TYPE: ICD-10-CM

## 2023-02-17 DIAGNOSIS — R13.10 DYSPHAGIA, UNSPECIFIED TYPE: ICD-10-CM

## 2023-02-17 DIAGNOSIS — K92.1 BLOOD IN STOOL: Primary | ICD-10-CM

## 2023-02-17 DIAGNOSIS — K92.1 BLOOD IN STOOL: ICD-10-CM

## 2023-02-17 LAB
BASOPHILS # BLD AUTO: 0.04 K/UL (ref 0–0.2)
BASOPHILS NFR BLD: 0.8 % (ref 0–1.9)
DIFFERENTIAL METHOD: ABNORMAL
EOSINOPHIL # BLD AUTO: 0.1 K/UL (ref 0–0.5)
EOSINOPHIL NFR BLD: 1.2 % (ref 0–8)
ERYTHROCYTE [DISTWIDTH] IN BLOOD BY AUTOMATED COUNT: 12.7 % (ref 11.5–14.5)
HCT VFR BLD AUTO: 44 % (ref 40–54)
HGB BLD-MCNC: 15.1 G/DL (ref 14–18)
IMM GRANULOCYTES # BLD AUTO: 0.01 K/UL (ref 0–0.04)
IMM GRANULOCYTES NFR BLD AUTO: 0.2 % (ref 0–0.5)
IRON SERPL-MCNC: 128 UG/DL (ref 45–160)
LYMPHOCYTES # BLD AUTO: 2.5 K/UL (ref 1–4.8)
LYMPHOCYTES NFR BLD: 51.7 % (ref 18–48)
MCH RBC QN AUTO: 31.4 PG (ref 27–31)
MCHC RBC AUTO-ENTMCNC: 34.3 G/DL (ref 32–36)
MCV RBC AUTO: 92 FL (ref 82–98)
MONOCYTES # BLD AUTO: 0.4 K/UL (ref 0.3–1)
MONOCYTES NFR BLD: 7.6 % (ref 4–15)
NEUTROPHILS # BLD AUTO: 1.9 K/UL (ref 1.8–7.7)
NEUTROPHILS NFR BLD: 38.5 % (ref 38–73)
NRBC BLD-RTO: 0 /100 WBC
PLATELET # BLD AUTO: 227 K/UL (ref 150–450)
PMV BLD AUTO: 9.5 FL (ref 9.2–12.9)
RBC # BLD AUTO: 4.81 M/UL (ref 4.6–6.2)
SATURATED IRON: 37 % (ref 20–50)
TOTAL IRON BINDING CAPACITY: 348 UG/DL (ref 250–450)
TRANSFERRIN SERPL-MCNC: 235 MG/DL (ref 200–375)
WBC # BLD AUTO: 4.87 K/UL (ref 3.9–12.7)

## 2023-02-17 PROCEDURE — 99214 OFFICE O/P EST MOD 30 MIN: CPT | Mod: S$GLB,,,

## 2023-02-17 PROCEDURE — 99999 PR PBB SHADOW E&M-EST. PATIENT-LVL III: CPT | Mod: PBBFAC,,,

## 2023-02-17 PROCEDURE — 85025 COMPLETE CBC W/AUTO DIFF WBC: CPT

## 2023-02-17 PROCEDURE — 99999 PR PBB SHADOW E&M-EST. PATIENT-LVL III: ICD-10-PCS | Mod: PBBFAC,,,

## 2023-02-17 PROCEDURE — 3008F BODY MASS INDEX DOCD: CPT | Mod: CPTII,S$GLB,,

## 2023-02-17 PROCEDURE — 1159F MED LIST DOCD IN RCRD: CPT | Mod: CPTII,S$GLB,,

## 2023-02-17 PROCEDURE — 1101F PR PT FALLS ASSESS DOC 0-1 FALLS W/OUT INJ PAST YR: ICD-10-PCS | Mod: CPTII,S$GLB,,

## 2023-02-17 PROCEDURE — 3008F PR BODY MASS INDEX (BMI) DOCUMENTED: ICD-10-PCS | Mod: CPTII,S$GLB,,

## 2023-02-17 PROCEDURE — 3288F FALL RISK ASSESSMENT DOCD: CPT | Mod: CPTII,S$GLB,,

## 2023-02-17 PROCEDURE — 1125F AMNT PAIN NOTED PAIN PRSNT: CPT | Mod: CPTII,S$GLB,,

## 2023-02-17 PROCEDURE — 36415 COLL VENOUS BLD VENIPUNCTURE: CPT

## 2023-02-17 PROCEDURE — 3288F PR FALLS RISK ASSESSMENT DOCUMENTED: ICD-10-PCS | Mod: CPTII,S$GLB,,

## 2023-02-17 PROCEDURE — 1125F PR PAIN SEVERITY QUANTIFIED, PAIN PRESENT: ICD-10-PCS | Mod: CPTII,S$GLB,,

## 2023-02-17 PROCEDURE — 84466 ASSAY OF TRANSFERRIN: CPT

## 2023-02-17 PROCEDURE — 99214 PR OFFICE/OUTPT VISIT, EST, LEVL IV, 30-39 MIN: ICD-10-PCS | Mod: S$GLB,,,

## 2023-02-17 PROCEDURE — 1159F PR MEDICATION LIST DOCUMENTED IN MEDICAL RECORD: ICD-10-PCS | Mod: CPTII,S$GLB,,

## 2023-02-17 PROCEDURE — 1101F PT FALLS ASSESS-DOCD LE1/YR: CPT | Mod: CPTII,S$GLB,,

## 2023-02-17 NOTE — PATIENT INSTRUCTIONS
Take pantoprazole once daily about 30 mins before breakfast    Take metamucil at night about 1 tbsp in 8 oz of water            EGD Instructions    [facility]  [address]  Clinic Office [phone]  Endoscopy Lab [phone]    You are scheduled for an EGD with Dr. Blanco  on  3/20/23 at [facility] in [place].    Check in at the Hospital -1st floor, Information desk.   Call [phone] to reschedule.    You cannot have anything to eat or drink after Midnight. You can brush your teeth with a sip of water.     An adult friend/family member must come with you to drive you home.  You cannot drive, take a taxi, Uber/Lyft or bus to leave the Endoscopy Center alone.  If you do not have someone to drive you home, your test will be cancelled.     Please follow the directions of your doctor if you take any pills that thin your blood. If you take these meds: Aggrenox, Brilinta, Effient, Eliquis, Lovenox, Plavix, Pletal, Pradaxa, Ticilid, Xarelto or Coumadin, let the doctor's office know.    DON'T: On the morning of the test do not take insulin or pills for diabetes.     DO: On the morning of the test, do take any pills for blood pressure, heart, anti-rejection and or seizures with a small sip of water. Bring any inhalers with you.    A Covid test is required 72 hours before the procedure. The test is not needed with proof of vaccination > 2 weeks or previous Covid infection.    Leave all valuables and jewelry at home. You will be at the hospital for 2-4 hours.    Call the Endoscopy department at [phone] with any questions about your procedure.    Thank you for choosing [facility].

## 2023-02-17 NOTE — PROGRESS NOTES
GASTROENTEROLOGY CLINIC NOTE    Reason for visit: The primary encounter diagnosis was Blood in stool. Diagnoses of Fatigue, unspecified type and Dysphagia, unspecified type were also pertinent to this visit.  Referring provider/PCP: Jamaal Cabrera DO    HPI:  Alessandro Cerna is a 67 y.o. male here today for hematochezia. Previously seen by Dr. Blanco for same issue, colonoscopy no source of bleeding, 1 polyp removed. Pt endorses still having rectal bleeding with every BM. He notes it is on the stool and is bright red. He has a BM daily, with occasional straining, notes sometimes feels like he isn't completely emptying. Saw CRS in october, hemmorhoids seen on anoscope- recommended fiber, avoid straining, banding if persists. UTD on colon cancer screening. No changes since last colonoscopy. No family history of cRC. He endorses some epigastric discomfort with intermittent dysphagia. This occurs randomly, about once a week. He feels food gets stuck mid sternum. He did have esophageal candida, hpylori and Schatzki ring in 2021. HP eradicated, confirmed with stool sample. He takes protonix intermittently. He notes when he takes it he has less symptoms.     Prior Endoscopy: 2021 with Dr. Blanco   EGD:  - Low-grade of narrowing Schatzki ring. Dilated.                          - Esophageal plaques were found, suspicious for candidiasis. Cells for cytology obtained.                          - Normal stomach. Biopsied.                          - Normal examined duodenum.      PATH: 1. Stomach, biopsy:   - Acute active gastritis   - Positive for Helicobacter pylori on immunostain    - Negative for intestinal metaplasia, dysplasia or malignancy    Colon:  - One 3 mm polyp in the transverse colon, removed with a cold biopsy forceps. Resected and retrieved.                          - The examination was otherwise normal on direct and retroflexion views. - Repeat colonoscopy in 5 years for surveillance.      PATH: 2.  Colon, transverse, polypectomy:   - Lymphoid aggregate    - Colonic mucosa without significant histopathologic alteration    - Negative for dysplasia or malignanc    (Portions of this note were dictated using voice recognition software and may contain dictation related errors in spelling/grammar/syntax not found on text review)    Review of Systems   Constitutional:  Positive for malaise/fatigue.   Gastrointestinal:  Positive for abdominal pain and blood in stool.     Past Medical History: has a past medical history of Chronic seasonal allergic rhinitis, GERD (gastroesophageal reflux disease), and TB (pulmonary tuberculosis).    Past Surgical History: has a past surgical history that includes Irrigation and debridement of lower extremity (Left, 12/30/2019); Esophagogastroduodenoscopy (EGD) with dilation (08/04/2021); Colonoscopy w/ polypectomy (08/04/2021); Esophagogastroduodenoscopy (N/A, 8/4/2021); and Colonoscopy (N/A, 8/4/2021).    Home medications:   Current Outpatient Medications on File Prior to Visit   Medication Sig Dispense Refill    b complex vitamins capsule Take 1 capsule by mouth once daily.      ciprofloxacin HCl (CIPRO) 500 MG tablet Take 500 mg by mouth 2 (two) times daily.      ergocalciferol (ERGOCALCIFEROL) 50,000 unit Cap Take 1 capsule (50,000 Units total) by mouth every 7 days. Then start daily OTC replacement after this Rx is complete 12 capsule 3    pantoprazole (PROTONIX) 40 MG tablet Take 1 tablet (40 mg total) by mouth 2 (two) times daily. (Patient taking differently: Take 40 mg by mouth 2 (two) times daily.) 180 tablet 3    tadalafiL (CIALIS) 20 MG Tab Take 1 tablet (20 mg total) by mouth daily as needed (take 30-60 minutes before on an empty stomach). 10 tablet 12    atorvastatin (LIPITOR) 10 MG tablet Take 1 tablet (10 mg total) by mouth every evening. (Patient not taking: Reported on 2/17/2023) 90 tablet 3     No current facility-administered medications on file prior to visit.  "      Vital signs:  Ht 5' 7" (1.702 m)   Wt 79.7 kg (175 lb 11.3 oz)   BMI 27.52 kg/m²     Physical Exam  Constitutional:       Appearance: Normal appearance. He is normal weight.   Abdominal:      General: Abdomen is flat. There is no distension.      Palpations: Abdomen is soft.      Tenderness: There is no abdominal tenderness.   Neurological:      Mental Status: He is alert.       I have reviewed associated labs, imaging and notes.     Assessment:  1. Blood in stool    2. Fatigue, unspecified type    3. Dysphagia, unspecified type      Ongoing rectal bleeding for a few years now   Occurring with every BM   Colonoscopy 2021- no source of bleeding, 1 polyp removed- 5 yr f/u   Occasional straining and notes incomplete evacuation    Start metamucil    Will check CBC   Saw CRS- hemorrhoids noted with anoscope    Recommended banding if continues       Dysphagia   Occurring about once a week   Resolved with drinking water   Shatzki ring in 2021- dilated   Not taking protonix daily     Plan:  Orders Placed This Encounter    CBC Auto Differential    Iron and TIBC    Case Request Endoscopy: EGD (ESOPHAGOGASTRODUODENOSCOPY)     Check CBC    Start metamucil daily  F/u with CRS if no improvement   UTD on surveillance colonoscopy      EGD for dysphagia  Take protonix daily       Monisha Zuniga NP  [facility]    "

## 2023-03-16 ENCOUNTER — TELEPHONE (OUTPATIENT)
Dept: ENDOSCOPY | Facility: HOSPITAL | Age: 68
End: 2023-03-16
Payer: MEDICARE

## 2023-03-16 NOTE — TELEPHONE ENCOUNTER
Left voice and text messages instructing patient to call dept @ [phone] between 8am-3pm.    Arrival time to be given @ 1300  EGD  (Message sent via [facility] portal)

## 2023-03-16 NOTE — TELEPHONE ENCOUNTER
Patient cancelled EGD on Monday, 03/20/22; states he will be out of town but will call back and reschedule at a later date.

## 2023-05-22 ENCOUNTER — OFFICE VISIT (OUTPATIENT)
Dept: UROLOGY | Facility: CLINIC | Age: 68
End: 2023-05-22
Payer: MEDICARE

## 2023-05-22 VITALS
SYSTOLIC BLOOD PRESSURE: 117 MMHG | DIASTOLIC BLOOD PRESSURE: 69 MMHG | HEART RATE: 73 BPM | HEIGHT: 67 IN | WEIGHT: 178 LBS | BODY MASS INDEX: 27.94 KG/M2

## 2023-05-22 DIAGNOSIS — N52.01 ERECTILE DYSFUNCTION DUE TO ARTERIAL INSUFFICIENCY: ICD-10-CM

## 2023-05-22 DIAGNOSIS — N13.8 BPH WITH URINARY OBSTRUCTION: ICD-10-CM

## 2023-05-22 DIAGNOSIS — N40.1 BPH WITH URINARY OBSTRUCTION: ICD-10-CM

## 2023-05-22 PROCEDURE — 99214 OFFICE O/P EST MOD 30 MIN: CPT | Mod: ,,, | Performed by: NURSE PRACTITIONER

## 2023-05-22 PROCEDURE — 99999 PR PBB SHADOW E&M-EST. PATIENT-LVL III: CPT | Mod: PBBFAC,,, | Performed by: NURSE PRACTITIONER

## 2023-05-22 PROCEDURE — 99999 PR PBB SHADOW E&M-EST. PATIENT-LVL III: ICD-10-PCS | Mod: PBBFAC,,, | Performed by: NURSE PRACTITIONER

## 2023-05-22 PROCEDURE — 99214 PR OFFICE/OUTPT VISIT, EST, LEVL IV, 30-39 MIN: ICD-10-PCS | Mod: ,,, | Performed by: NURSE PRACTITIONER

## 2023-05-22 RX ORDER — SULFAMETHOXAZOLE AND TRIMETHOPRIM 800; 160 MG/1; MG/1
TABLET ORAL
COMMUNITY
Start: 2022-11-29 | End: 2023-07-14

## 2023-05-22 RX ORDER — TADALAFIL 20 MG/1
20 TABLET ORAL DAILY PRN
Qty: 20 TABLET | Refills: 12 | Status: SHIPPED | OUTPATIENT
Start: 2023-05-22 | End: 2024-05-21

## 2023-05-22 RX ORDER — CYCLOBENZAPRINE HCL 5 MG
TABLET ORAL
COMMUNITY
Start: 2022-11-29 | End: 2023-07-14

## 2023-05-22 NOTE — PROGRESS NOTES
CHIEF COMPLAINT:    Alessandro Cerna is a 67 y.o. male presents today for Annual Visit.    HISTORY OF PRESENTING ILLINESS:    Alessandro Cerna is a 67 y.o. male with a history of BPH and ED.  He has a family history of Prostate Cancer with his father.   He was taking Cialis 20mg PRN for ED.     He was last seen in clinic 05/17/2022    Here today for f/u visit and med refill.  Voices no urinary complaints.   No dysuria/hematuria; not up all night.     He states he gets results from the Cialis; pleased.  Asked about tx's seen on TV; gummies     Last PSA was 1.9 on 06/29/2022  Gets PSA's from his PCP; labs next month.            REVIEW OF SYSTEMS:  Review of Systems   Constitutional: Negative.  Negative for chills and fever.   Eyes:  Negative for double vision.   Respiratory:  Negative for cough and shortness of breath.    Cardiovascular:  Negative for chest pain.   Gastrointestinal:  Negative for abdominal pain, constipation, diarrhea, nausea and vomiting.   Genitourinary: Negative.  Negative for dysuria, flank pain and hematuria.        Pleased with urination  Nocturia x1     Neurological:  Negative for dizziness and seizures.   Endo/Heme/Allergies:  Negative for polydipsia.       PATIENT HISTORY:    Past Medical History:   Diagnosis Date    Chronic seasonal allergic rhinitis 11/16/2017    GERD (gastroesophageal reflux disease)     TB (pulmonary tuberculosis) 1982    Treated [facility] 9 mo therapy.       Past Surgical History:   Procedure Laterality Date    COLONOSCOPY N/A 8/4/2021    Procedure: COLONOSCOPY   suprep;  Surgeon: Mo Blanco MD;  Location: [facility];  Service: Endoscopy;  Laterality: N/A;  covid test; 08/01/2021(sunday) [facility] 2;00[facility]                          ANB    COLONOSCOPY W/ POLYPECTOMY  08/04/2021    ESOPHAGOGASTRODUODENOSCOPY N/A 8/4/2021    Procedure: EGD (ESOPHAGOGASTRODUODENOSCOPY);  Surgeon: Mo Blanco MD;  Location: [facility];  Service: Endoscopy;  Laterality: N/A;     ESOPHAGOGASTRODUODENOSCOPY (EGD) WITH DILATION  2021    IRRIGATION AND DEBRIDEMENT OF LOWER EXTREMITY Left 2019    Procedure: IRRIGATION AND DEBRIDEMENT, LOWER EXTREMITY Bone biopsy heel;  Surgeon: Karo Marsh MD;  Location: [facility];  Service: Podiatry;  Laterality: Left;       Family History   Problem Relation Age of Onset    Breast cancer Mother     Cancer Mother     Prostate cancer Father     Cancer Father     No Known Problems Sister     No Known Problems Brother     No Known Problems Brother     No Known Problems Brother     Emphysema Neg Hx        Social History     Socioeconomic History    Marital status:    Tobacco Use    Smoking status: Former     Years: 2.00     Types: Cigarettes     Quit date: 4/3/1977     Years since quittin.1    Smokeless tobacco: Never    Tobacco comments:     1-2 cigs/month. x 2-3 years.   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 He lives with his wife at home. Has a dog at home. No smokers at home. He has 3 kids; they lives nearby. 2 grandchildren (20 and 14).        Allergies:  Patient has no known allergies.    Medications:    Current Outpatient Medications:     b complex vitamins capsule, Take 1 capsule by mouth once daily., Disp: , Rfl:     cyclobenzaprine (FLEXERIL) 5 MG tablet, , Disp: , Rfl:     ergocalciferol (ERGOCALCIFEROL) 50,000 unit Cap, Take 1 capsule (50,000 Units total) by mouth every 7 days. Then start daily OTC replacement after this Rx is complete, Disp: 12 capsule, Rfl: 3    pantoprazole (PROTONIX) 40 MG tablet, Take 1 tablet (40 mg total) by mouth 2 (two) times daily. (Patient taking differently: Take 40 mg by mouth 2 (two) times daily.),  Disp: 180 tablet, Rfl: 3    sulfamethoxazole-trimethoprim 800-160mg (BACTRIM DS) 800-160 mg Tab, , Disp: , Rfl:     tadalafiL (CIALIS) 20 MG Tab, Take 1 tablet (20 mg total) by mouth daily as needed (take 30-60 minutes before on an empty stomach)., Disp: 20 tablet, Rfl: 12    PHYSICAL EXAMINATION:  Physical Exam  Vitals and nursing note reviewed.   Constitutional:       General: He is awake.      Appearance: Normal appearance.   HENT:      Head: Normocephalic.      Right Ear: External ear normal.      Left Ear: External ear normal.      Nose: Nose normal.   Cardiovascular:      Rate and Rhythm: Normal rate.   Pulmonary:      Effort: Pulmonary effort is normal. No respiratory distress.   Abdominal:      Tenderness: There is no abdominal tenderness. There is no right CVA tenderness or left CVA tenderness.   Genitourinary:     Penis: Normal and circumcised. No hypospadias or erythema.       Testes: Normal.         Right: Swelling not present.         Left: Swelling not present.      Prostate: Enlarged (~35gms; smooth). Not tender and no nodules present.      Rectum: Normal.   Musculoskeletal:         General: Normal range of motion.      Cervical back: Normal range of motion.   Skin:     General: Skin is warm and dry.   Neurological:      General: No focal deficit present.      Mental Status: He is alert and oriented to person, place, and time.   Psychiatric:         Mood and Affect: Mood normal.         Behavior: Behavior is cooperative.         LABS:        Lab Results   Component Value Date    PSA 1.9 06/29/2022    PSA 1.3 06/10/2021    PSA 1.4 04/03/2020       Lab Results   Component Value Date    CREATININE 1.0 06/29/2022             IMPRESSION:    Encounter Diagnoses   Name Primary?    BPH with urinary obstruction     Erectile dysfunction due to arterial insufficiency          Assessment:       1. BPH with urinary obstruction    2. Erectile dysfunction due to arterial insufficiency        Plan:         I spent 30  minutes with the patient of which more than half was spent in direct consultation with the patient in regards to our treatment and plan.  We addressed the office findings and recent labs.   Education and recommendations of today's plan of care including home remedies and needed follow up with PCP.   We discussed the chief complaint; reviewed the LUTS and the possible contributory factors.   We discussed his ED and the contributory factors.  Follow up with PCP for underlying medical conditions causing ED.   We discussed the physiological as well as his psychological aspects that contribute to ED.   Discussed with OTC ED meds.   Refilled his Cialis 20 to [facility]  Recommended lifestyle modifications with a proper, healthy diet, good hydration but during the day. Reducing bladder irritants.   Benefits of regular exercise.  RTC one year

## 2023-07-14 ENCOUNTER — OFFICE VISIT (OUTPATIENT)
Dept: FAMILY MEDICINE | Facility: CLINIC | Age: 68
End: 2023-07-14
Payer: MEDICARE

## 2023-07-14 VITALS
WEIGHT: 175.94 LBS | DIASTOLIC BLOOD PRESSURE: 72 MMHG | OXYGEN SATURATION: 98 % | BODY MASS INDEX: 27.61 KG/M2 | SYSTOLIC BLOOD PRESSURE: 112 MMHG | TEMPERATURE: 98 F | HEIGHT: 67 IN | HEART RATE: 82 BPM

## 2023-07-14 DIAGNOSIS — Z12.5 SCREENING PSA (PROSTATE SPECIFIC ANTIGEN): ICD-10-CM

## 2023-07-14 DIAGNOSIS — E55.9 VITAMIN D DEFICIENCY: ICD-10-CM

## 2023-07-14 DIAGNOSIS — K21.9 GERD WITHOUT ESOPHAGITIS: ICD-10-CM

## 2023-07-14 DIAGNOSIS — Z91.89 10 YEAR RISK OF MI OR STROKE 7.5% OR GREATER: ICD-10-CM

## 2023-07-14 DIAGNOSIS — E78.49 OTHER HYPERLIPIDEMIA: Primary | ICD-10-CM

## 2023-07-14 DIAGNOSIS — Z87.2 HISTORY OF CELLULITIS: ICD-10-CM

## 2023-07-14 DIAGNOSIS — N52.9 ERECTILE DYSFUNCTION, UNSPECIFIED ERECTILE DYSFUNCTION TYPE: ICD-10-CM

## 2023-07-14 DIAGNOSIS — E53.8 B12 DEFICIENCY: ICD-10-CM

## 2023-07-14 DIAGNOSIS — K22.2 LOWER ESOPHAGEAL RING (SCHATZKI): ICD-10-CM

## 2023-07-14 PROBLEM — L03.116 CELLULITIS OF LEFT FOOT: Status: RESOLVED | Noted: 2019-12-05 | Resolved: 2023-07-14

## 2023-07-14 PROCEDURE — 99214 OFFICE O/P EST MOD 30 MIN: CPT | Mod: PBBFAC,PO | Performed by: FAMILY MEDICINE

## 2023-07-14 PROCEDURE — 99214 OFFICE O/P EST MOD 30 MIN: CPT | Mod: S$GLB,,, | Performed by: FAMILY MEDICINE

## 2023-07-14 PROCEDURE — 99999 PR PBB SHADOW E&M-EST. PATIENT-LVL IV: ICD-10-PCS | Mod: PBBFAC,,, | Performed by: FAMILY MEDICINE

## 2023-07-14 PROCEDURE — 99214 PR OFFICE/OUTPT VISIT, EST, LEVL IV, 30-39 MIN: ICD-10-PCS | Mod: S$GLB,,, | Performed by: FAMILY MEDICINE

## 2023-07-14 PROCEDURE — 99999 PR PBB SHADOW E&M-EST. PATIENT-LVL IV: CPT | Mod: PBBFAC,,, | Performed by: FAMILY MEDICINE

## 2023-07-14 RX ORDER — PANTOPRAZOLE SODIUM 40 MG/1
40 TABLET, DELAYED RELEASE ORAL DAILY
Qty: 90 TABLET | Refills: 3 | Status: SHIPPED | OUTPATIENT
Start: 2023-07-14 | End: 2024-07-13

## 2023-07-14 RX ORDER — EZETIMIBE 10 MG/1
10 TABLET ORAL DAILY
Qty: 90 TABLET | Refills: 3 | Status: SHIPPED | OUTPATIENT
Start: 2023-07-14 | End: 2024-07-13

## 2023-07-14 RX ORDER — ERGOCALCIFEROL 1.25 MG/1
50000 CAPSULE ORAL
Qty: 12 CAPSULE | Refills: 3 | Status: SHIPPED | OUTPATIENT
Start: 2023-07-14

## 2023-07-14 NOTE — PROGRESS NOTES
Subjective:       Patient ID: Alessandro Cerna is a 68 y.o. male.    Chief Complaint: Annual Exam      Alessandro Cerna is a 68 y.o. male who presents today  for an annual exam     Labs: ordered     C-scope: Last Colonoscopy completed on 8/4/2021     NOT taking any medication.      GERD/history of h pylori/Schatzi Ring: had EGD, 2021. Saw GI. Swallowing has improved. Stopped PPI.   DLD: on statin. NOT taking. No side effects. Read things online and wanted to stop.     b12 def: likely due to PPI. Noted 7/2022.   Vitamin D: improving, but still low.     Having ED. Saw urology. Does report stress. Urology states they thought this may not be physiological per note review. He disagrees. Cialis and viagra haven't helped. Doesn't have AM erections.     Only smoked 1-2 cigs a month for about a year or two. Doubt need for AAA screening.        PMHx: reviewed in EMR and updated  Meds: reviewed in EMR and updated  Shx: reviewed in EMR and updated  FMHx: no family history of colon cancer, breast cancer, ovarian cancer  Social: patient has a cleaning company. He lives with his wife at home. Has a dog at home. No smokers at home. He has 3 kids; they lives nearby. 2 grandchildren     Review of Systems   Constitutional:  Negative for chills and fever.   Respiratory:  Negative for shortness of breath.    Cardiovascular:  Negative for chest pain.   Gastrointestinal:  Negative for nausea and vomiting.   Neurological:  Negative for dizziness, light-headedness and headaches.       Health Maintenance Due   Topic Date Due    Shingles Vaccine (1 of 2) Never done    TETANUS VACCINE  10/25/2015    COVID-19 Vaccine (3 - Moderna series) 06/08/2021    PROSTATE-SPECIFIC ANTIGEN  06/29/2023     Immunization History   Administered Date(s) Administered    COVID-19, MRNA, LN-S, PF (MODERNA FULL 0.5 ML DOSE) 03/16/2021, 04/13/2021    Influenza (FLUAD) - Quadrivalent - Adjuvanted - PF *Preferred* (65+) 11/03/2022    Influenza - Quadrivalent  "- PF *Preferred* (6 months and older) 12/13/2021    Pneumococcal Polysaccharide - 23 Valent 06/11/2014    Td (ADULT) 10/25/2005         Objective:     Vitals:    07/14/23 1008   BP: 112/72   BP Location: Left arm   Patient Position: Sitting   BP Method: Medium (Manual)   Pulse: 82   Temp: 97.7 °F (36.5 °C)   TempSrc: Oral   SpO2: 98%   Weight: 79.8 kg (175 lb 14.8 oz)   Height: 5' 7" (1.702 m)        Physical Exam  Constitutional:       General: He is not in acute distress.     Appearance: He is not ill-appearing, toxic-appearing or diaphoretic.   Cardiovascular:      Rate and Rhythm: Normal rate and regular rhythm.   Pulmonary:      Effort: Pulmonary effort is normal.      Breath sounds: Normal breath sounds.   Abdominal:      Palpations: Abdomen is soft.      Tenderness: There is no abdominal tenderness.   Musculoskeletal:         General: No swelling.   Neurological:      General: No focal deficit present.      Mental Status: He is alert.   Psychiatric:         Mood and Affect: Mood normal.         Behavior: Behavior normal.         Thought Content: Thought content normal.         Judgment: Judgment normal.       Assessment:       1. Other hyperlipidemia    2. GERD without esophagitis    3. 10 year risk of MI or stroke 7.5% or greater    4. History of cellulitis    5. Screening PSA (prostate specific antigen)    6. B12 deficiency    7. Vitamin D deficiency    8. BMI 27.0-27.9,adult    9. Lower esophageal ring (Schatzki)    10. Erectile dysfunction, unspecified erectile dysfunction type        Plan:         Declined statin. Discussed why I did not agree.   The 10-year ASCVD risk score (Brenda DK, et al., 2019) is: 12.3%  Try zetia  Restart PPI  You have low B12. Goal >400. Start daily OTC B12 supplementation at 1000 mcg   You have low vitamin D and a Rx has been sent to your pharmacy. Take this pill once a week and when the prescription and refills are done, start taking an OTC vitamin D supplementation at 1000 IU " daily.     Labs in 1 month. Will check T per request.     Declined PNA vaccine today.     Other hyperlipidemia  -     CBC Auto Differential; Future; Expected date: 07/14/2023  -     Comprehensive Metabolic Panel; Future; Expected date: 07/14/2023  -     TSH; Future; Expected date: 07/14/2023  -     Lipid Panel; Future; Expected date: 07/14/2023  -     ezetimibe (ZETIA) 10 mg tablet; Take 1 tablet (10 mg total) by mouth once daily.  Dispense: 90 tablet; Refill: 3    GERD without esophagitis  -     pantoprazole (PROTONIX) 40 MG tablet; Take 1 tablet (40 mg total) by mouth once daily.  Dispense: 90 tablet; Refill: 3    10 year risk of MI or stroke 7.5% or greater  -     ezetimibe (ZETIA) 10 mg tablet; Take 1 tablet (10 mg total) by mouth once daily.  Dispense: 90 tablet; Refill: 3    History of cellulitis    Screening PSA (prostate specific antigen)  -     PSA, Screening; Future; Expected date: 07/14/2023    B12 deficiency  -     Vitamin B12; Future; Expected date: 07/14/2023    Vitamin D deficiency  -     Vitamin D; Future; Expected date: 07/14/2023  -     ergocalciferol (ERGOCALCIFEROL) 50,000 unit Cap; Take 1 capsule (50,000 Units total) by mouth every 7 days. Then start daily OTC replacement after this Rx is complete  Dispense: 12 capsule; Refill: 3    BMI 27.0-27.9,adult    Lower esophageal ring (Schatzki)  -     pantoprazole (PROTONIX) 40 MG tablet; Take 1 tablet (40 mg total) by mouth once daily.  Dispense: 90 tablet; Refill: 3    Erectile dysfunction, unspecified erectile dysfunction type  -     Testosterone; Future; Expected date: 07/14/2023

## 2023-07-14 NOTE — PATIENT INSTRUCTIONS
Declined statin  Try zetia  Restart PPI  You have low B12. Goal >400. Start daily OTC B12 supplementation at 1000 mcg   You have low vitamin D and a Rx has been sent to your pharmacy. Take this pill once a week and when the prescription and refills are done, start taking an OTC vitamin D supplementation at 1000 IU daily.     Labs in 1 month. Will check T per request.

## 2023-08-17 ENCOUNTER — LAB VISIT (OUTPATIENT)
Dept: LAB | Facility: HOSPITAL | Age: 68
End: 2023-08-17
Attending: FAMILY MEDICINE
Payer: MEDICARE

## 2023-08-17 DIAGNOSIS — E78.49 OTHER HYPERLIPIDEMIA: ICD-10-CM

## 2023-08-17 DIAGNOSIS — E53.8 B12 DEFICIENCY: ICD-10-CM

## 2023-08-17 DIAGNOSIS — E55.9 VITAMIN D DEFICIENCY: ICD-10-CM

## 2023-08-17 DIAGNOSIS — N52.9 ERECTILE DYSFUNCTION, UNSPECIFIED ERECTILE DYSFUNCTION TYPE: ICD-10-CM

## 2023-08-17 DIAGNOSIS — Z12.5 SCREENING PSA (PROSTATE SPECIFIC ANTIGEN): ICD-10-CM

## 2023-08-17 LAB
25(OH)D3+25(OH)D2 SERPL-MCNC: 34 NG/ML (ref 30–96)
ALBUMIN SERPL BCP-MCNC: 4.3 G/DL (ref 3.5–5.2)
ALP SERPL-CCNC: 86 U/L (ref 55–135)
ALT SERPL W/O P-5'-P-CCNC: 21 U/L (ref 10–44)
ANION GAP SERPL CALC-SCNC: 8 MMOL/L (ref 8–16)
AST SERPL-CCNC: 22 U/L (ref 10–40)
BASOPHILS # BLD AUTO: 0.04 K/UL (ref 0–0.2)
BASOPHILS NFR BLD: 0.9 % (ref 0–1.9)
BILIRUB SERPL-MCNC: 0.8 MG/DL (ref 0.1–1)
BUN SERPL-MCNC: 16 MG/DL (ref 8–23)
CALCIUM SERPL-MCNC: 9.2 MG/DL (ref 8.7–10.5)
CHLORIDE SERPL-SCNC: 106 MMOL/L (ref 95–110)
CHOLEST SERPL-MCNC: 169 MG/DL (ref 120–199)
CHOLEST/HDLC SERPL: 4.7 {RATIO} (ref 2–5)
CO2 SERPL-SCNC: 27 MMOL/L (ref 23–29)
COMPLEXED PSA SERPL-MCNC: 1.5 NG/ML (ref 0–4)
CREAT SERPL-MCNC: 0.9 MG/DL (ref 0.5–1.4)
DIFFERENTIAL METHOD BLD: ABNORMAL
EOSINOPHIL # BLD AUTO: 0.1 K/UL (ref 0–0.5)
EOSINOPHIL NFR BLD: 2.4 % (ref 0–8)
ERYTHROCYTE [DISTWIDTH] IN BLOOD BY AUTOMATED COUNT: 13.2 % (ref 11.5–14.5)
EST. GFR  (NO RACE VARIABLE): >60 ML/MIN/1.73 M^2
GLUCOSE SERPL-MCNC: 97 MG/DL (ref 70–110)
HCT VFR BLD AUTO: 43 % (ref 40–54)
HDLC SERPL-MCNC: 36 MG/DL (ref 40–75)
HDLC SERPL: 21.3 % (ref 20–50)
HGB BLD-MCNC: 14.7 G/DL (ref 14–18)
IMM GRANULOCYTES # BLD AUTO: 0.01 K/UL (ref 0–0.04)
IMM GRANULOCYTES NFR BLD AUTO: 0.2 % (ref 0–0.5)
LDLC SERPL CALC-MCNC: 108.8 MG/DL (ref 63–159)
LYMPHOCYTES # BLD AUTO: 1.9 K/UL (ref 1–4.8)
LYMPHOCYTES NFR BLD: 42.1 % (ref 18–48)
MCH RBC QN AUTO: 32.1 PG (ref 27–31)
MCHC RBC AUTO-ENTMCNC: 34.2 G/DL (ref 32–36)
MCV RBC AUTO: 94 FL (ref 82–98)
MONOCYTES # BLD AUTO: 0.4 K/UL (ref 0.3–1)
MONOCYTES NFR BLD: 7.8 % (ref 4–15)
NEUTROPHILS # BLD AUTO: 2.1 K/UL (ref 1.8–7.7)
NEUTROPHILS NFR BLD: 46.6 % (ref 38–73)
NONHDLC SERPL-MCNC: 133 MG/DL
NRBC BLD-RTO: 0 /100 WBC
PLATELET # BLD AUTO: 243 K/UL (ref 150–450)
PMV BLD AUTO: 10.3 FL (ref 9.2–12.9)
POTASSIUM SERPL-SCNC: 4.8 MMOL/L (ref 3.5–5.1)
PROT SERPL-MCNC: 7.8 G/DL (ref 6–8.4)
RBC # BLD AUTO: 4.58 M/UL (ref 4.6–6.2)
SODIUM SERPL-SCNC: 141 MMOL/L (ref 136–145)
TESTOST SERPL-MCNC: 575 NG/DL (ref 304–1227)
TRIGL SERPL-MCNC: 121 MG/DL (ref 30–150)
TSH SERPL DL<=0.005 MIU/L-ACNC: 2.18 UIU/ML (ref 0.4–4)
VIT B12 SERPL-MCNC: 440 PG/ML (ref 210–950)
WBC # BLD AUTO: 4.49 K/UL (ref 3.9–12.7)

## 2023-08-17 PROCEDURE — 85025 COMPLETE CBC W/AUTO DIFF WBC: CPT | Performed by: FAMILY MEDICINE

## 2023-08-17 PROCEDURE — 82607 VITAMIN B-12: CPT | Performed by: FAMILY MEDICINE

## 2023-08-17 PROCEDURE — 80053 COMPREHEN METABOLIC PANEL: CPT | Performed by: FAMILY MEDICINE

## 2023-08-17 PROCEDURE — 84403 ASSAY OF TOTAL TESTOSTERONE: CPT | Performed by: FAMILY MEDICINE

## 2023-08-17 PROCEDURE — 80061 LIPID PANEL: CPT | Performed by: FAMILY MEDICINE

## 2023-08-17 PROCEDURE — 82306 VITAMIN D 25 HYDROXY: CPT | Performed by: FAMILY MEDICINE

## 2023-08-17 PROCEDURE — 84443 ASSAY THYROID STIM HORMONE: CPT | Performed by: FAMILY MEDICINE

## 2023-08-17 PROCEDURE — 36415 COLL VENOUS BLD VENIPUNCTURE: CPT | Mod: PO | Performed by: FAMILY MEDICINE

## 2023-08-17 PROCEDURE — 84153 ASSAY OF PSA TOTAL: CPT | Performed by: FAMILY MEDICINE

## 2023-09-05 ENCOUNTER — OFFICE VISIT (OUTPATIENT)
Dept: FAMILY MEDICINE | Facility: CLINIC | Age: 68
End: 2023-09-05
Payer: MEDICARE

## 2023-09-05 VITALS
DIASTOLIC BLOOD PRESSURE: 64 MMHG | HEART RATE: 65 BPM | WEIGHT: 180.31 LBS | OXYGEN SATURATION: 98 % | SYSTOLIC BLOOD PRESSURE: 118 MMHG | BODY MASS INDEX: 28.3 KG/M2 | HEIGHT: 67 IN

## 2023-09-05 DIAGNOSIS — S16.1XXA STRAIN OF NECK MUSCLE, INITIAL ENCOUNTER: Primary | ICD-10-CM

## 2023-09-05 DIAGNOSIS — R20.0 FACIAL NUMBNESS: ICD-10-CM

## 2023-09-05 DIAGNOSIS — H54.7 WORSENING VISION: ICD-10-CM

## 2023-09-05 PROCEDURE — 1126F AMNT PAIN NOTED NONE PRSNT: CPT | Mod: CPTII,S$GLB,, | Performed by: STUDENT IN AN ORGANIZED HEALTH CARE EDUCATION/TRAINING PROGRAM

## 2023-09-05 PROCEDURE — 3078F PR MOST RECENT DIASTOLIC BLOOD PRESSURE < 80 MM HG: ICD-10-PCS | Mod: CPTII,S$GLB,, | Performed by: STUDENT IN AN ORGANIZED HEALTH CARE EDUCATION/TRAINING PROGRAM

## 2023-09-05 PROCEDURE — 1101F PT FALLS ASSESS-DOCD LE1/YR: CPT | Mod: CPTII,S$GLB,, | Performed by: STUDENT IN AN ORGANIZED HEALTH CARE EDUCATION/TRAINING PROGRAM

## 2023-09-05 PROCEDURE — 99999 PR PBB SHADOW E&M-EST. PATIENT-LVL IV: ICD-10-PCS | Mod: PBBFAC,,, | Performed by: STUDENT IN AN ORGANIZED HEALTH CARE EDUCATION/TRAINING PROGRAM

## 2023-09-05 PROCEDURE — 3288F FALL RISK ASSESSMENT DOCD: CPT | Mod: CPTII,S$GLB,, | Performed by: STUDENT IN AN ORGANIZED HEALTH CARE EDUCATION/TRAINING PROGRAM

## 2023-09-05 PROCEDURE — 1160F RVW MEDS BY RX/DR IN RCRD: CPT | Mod: CPTII,S$GLB,, | Performed by: STUDENT IN AN ORGANIZED HEALTH CARE EDUCATION/TRAINING PROGRAM

## 2023-09-05 PROCEDURE — 3288F PR FALLS RISK ASSESSMENT DOCUMENTED: ICD-10-PCS | Mod: CPTII,S$GLB,, | Performed by: STUDENT IN AN ORGANIZED HEALTH CARE EDUCATION/TRAINING PROGRAM

## 2023-09-05 PROCEDURE — 1159F MED LIST DOCD IN RCRD: CPT | Mod: CPTII,S$GLB,, | Performed by: STUDENT IN AN ORGANIZED HEALTH CARE EDUCATION/TRAINING PROGRAM

## 2023-09-05 PROCEDURE — 3074F PR MOST RECENT SYSTOLIC BLOOD PRESSURE < 130 MM HG: ICD-10-PCS | Mod: CPTII,S$GLB,, | Performed by: STUDENT IN AN ORGANIZED HEALTH CARE EDUCATION/TRAINING PROGRAM

## 2023-09-05 PROCEDURE — 99214 OFFICE O/P EST MOD 30 MIN: CPT | Mod: S$GLB,,, | Performed by: STUDENT IN AN ORGANIZED HEALTH CARE EDUCATION/TRAINING PROGRAM

## 2023-09-05 PROCEDURE — 1101F PR PT FALLS ASSESS DOC 0-1 FALLS W/OUT INJ PAST YR: ICD-10-PCS | Mod: CPTII,S$GLB,, | Performed by: STUDENT IN AN ORGANIZED HEALTH CARE EDUCATION/TRAINING PROGRAM

## 2023-09-05 PROCEDURE — 3078F DIAST BP <80 MM HG: CPT | Mod: CPTII,S$GLB,, | Performed by: STUDENT IN AN ORGANIZED HEALTH CARE EDUCATION/TRAINING PROGRAM

## 2023-09-05 PROCEDURE — 1159F PR MEDICATION LIST DOCUMENTED IN MEDICAL RECORD: ICD-10-PCS | Mod: CPTII,S$GLB,, | Performed by: STUDENT IN AN ORGANIZED HEALTH CARE EDUCATION/TRAINING PROGRAM

## 2023-09-05 PROCEDURE — 3008F BODY MASS INDEX DOCD: CPT | Mod: CPTII,S$GLB,, | Performed by: STUDENT IN AN ORGANIZED HEALTH CARE EDUCATION/TRAINING PROGRAM

## 2023-09-05 PROCEDURE — 99214 PR OFFICE/OUTPT VISIT, EST, LEVL IV, 30-39 MIN: ICD-10-PCS | Mod: S$GLB,,, | Performed by: STUDENT IN AN ORGANIZED HEALTH CARE EDUCATION/TRAINING PROGRAM

## 2023-09-05 PROCEDURE — 1160F PR REVIEW ALL MEDS BY PRESCRIBER/CLIN PHARMACIST DOCUMENTED: ICD-10-PCS | Mod: CPTII,S$GLB,, | Performed by: STUDENT IN AN ORGANIZED HEALTH CARE EDUCATION/TRAINING PROGRAM

## 2023-09-05 PROCEDURE — 3008F PR BODY MASS INDEX (BMI) DOCUMENTED: ICD-10-PCS | Mod: CPTII,S$GLB,, | Performed by: STUDENT IN AN ORGANIZED HEALTH CARE EDUCATION/TRAINING PROGRAM

## 2023-09-05 PROCEDURE — 3074F SYST BP LT 130 MM HG: CPT | Mod: CPTII,S$GLB,, | Performed by: STUDENT IN AN ORGANIZED HEALTH CARE EDUCATION/TRAINING PROGRAM

## 2023-09-05 PROCEDURE — 99999 PR PBB SHADOW E&M-EST. PATIENT-LVL IV: CPT | Mod: PBBFAC,,, | Performed by: STUDENT IN AN ORGANIZED HEALTH CARE EDUCATION/TRAINING PROGRAM

## 2023-09-05 PROCEDURE — 1126F PR PAIN SEVERITY QUANTIFIED, NO PAIN PRESENT: ICD-10-PCS | Mod: CPTII,S$GLB,, | Performed by: STUDENT IN AN ORGANIZED HEALTH CARE EDUCATION/TRAINING PROGRAM

## 2023-09-05 RX ORDER — DICLOFENAC SODIUM 10 MG/G
2 GEL TOPICAL 3 TIMES DAILY PRN
Qty: 450 G | Refills: 0 | Status: SHIPPED | OUTPATIENT
Start: 2023-09-05
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
## 2023-10-25 ENCOUNTER — PATIENT OUTREACH (OUTPATIENT)
Dept: ADMINISTRATIVE | Facility: HOSPITAL | Age: 68
End: 2023-10-25

## 2023-10-25 ENCOUNTER — OFFICE VISIT (OUTPATIENT)
Dept: FAMILY MEDICINE | Facility: CLINIC | Age: 68
End: 2023-10-25

## 2023-10-25 VITALS
HEIGHT: 67 IN | HEART RATE: 69 BPM | OXYGEN SATURATION: 97 % | SYSTOLIC BLOOD PRESSURE: 112 MMHG | WEIGHT: 188.25 LBS | BODY MASS INDEX: 29.55 KG/M2 | DIASTOLIC BLOOD PRESSURE: 78 MMHG

## 2023-10-25 DIAGNOSIS — B00.1 COLD SORE: Primary | ICD-10-CM

## 2023-10-25 PROCEDURE — 99213 OFFICE O/P EST LOW 20 MIN: CPT | Mod: PBBFAC,PO | Performed by: FAMILY MEDICINE

## 2023-10-25 PROCEDURE — 99214 PR OFFICE/OUTPT VISIT, EST, LEVL IV, 30-39 MIN: ICD-10-PCS | Mod: S$PBB,,, | Performed by: FAMILY MEDICINE

## 2023-10-25 PROCEDURE — 99214 OFFICE O/P EST MOD 30 MIN: CPT | Mod: S$PBB,,, | Performed by: FAMILY MEDICINE

## 2023-10-25 PROCEDURE — 99999 PR PBB SHADOW E&M-EST. PATIENT-LVL III: CPT | Mod: PBBFAC,,, | Performed by: FAMILY MEDICINE

## 2023-10-25 PROCEDURE — 99999 PR PBB SHADOW E&M-EST. PATIENT-LVL III: ICD-10-PCS | Mod: PBBFAC,,, | Performed by: FAMILY MEDICINE

## 2023-10-25 RX ORDER — VALACYCLOVIR HYDROCHLORIDE 1 G/1
1000 TABLET, FILM COATED ORAL 2 TIMES DAILY
Qty: 20 TABLET | Refills: 0 | Status: SHIPPED | OUTPATIENT
Start: 2023-10-25 | End: 2023-11-04

## 2023-10-25 NOTE — PROGRESS NOTES
"Subjective:       Patient ID: Alessandro Cerna is a 68 y.o. male.    Chief Complaint: Blister (X 4 days top right corner of lip)    Alessandro is a 68 y.o. male who presents today for a possible infection. He reports he is dating someone and she was diagnosed with "an infection" in her mouth. He doesn't know what she has or what she is being treated with, possibly an antibiotic. He reports having a small blister on his lip. He has never has this before. He denies any pain or burning. He thinks his are improving. He thinks his symptoms started about 3 days ago. He has had this before. But doesn't think he was ever treated for this.         Review of Systems   Skin:  Positive for rash.             Objective:     Vitals:    10/25/23 1336   BP: 112/78   Pulse: 69   SpO2: 97%   Weight: 85.4 kg (188 lb 4.4 oz)   Height: 5' 7" (1.702 m)        Physical Exam  Vitals and nursing note reviewed.   Cardiovascular:      Rate and Rhythm: Normal rate and regular rhythm.   Pulmonary:      Effort: Pulmonary effort is normal.      Breath sounds: Normal breath sounds.   Skin:     Findings: Rash present.      Comments: See picture below   Neurological:      Mental Status: He is alert.   Psychiatric:         Mood and Affect: Mood normal.         Behavior: Behavior normal.         Thought Content: Thought content normal.         Judgment: Judgment normal.              Assessment:       1. Cold sore        Plan:         Valtrex bid x 10 days  If this occurs again, will need 2 g BID x 1 days for recurrence    Cold sore  -     valACYclovir (VALTREX) 1000 MG tablet; Take 1 tablet (1,000 mg total) by mouth 2 (two) times daily. for 10 days  Dispense: 20 tablet; Refill: 0            Warning signs discussed, patient to call with any further issues or worsening of symptoms. Above note may have utilized dictation, please excuse any transcription errors.         "

## 2024-04-15 NOTE — PROGRESS NOTES
DATE: 2/27/2017  PATIENT: Alessandro Cerna    Supervising Physician: Surinder Walton M.D.    CHIEF COMPLAINT: right hip pain    HISTORY:  Alessandro Cerna is a 61 y.o. male from [place] here for initial evaluation of right hip and groin pain (Hip- 5). The pain has been present for several years but has progressively worsened over the last few months.  He was seen by Dr. Chiu in 2015 for similar symptoms.  He reports occasional right buttock pain but his primary complaint is right groin pain.  The patient describes the pain as sharp.  The pain is worse with driving and bending to put his shoes on, and improved by rest. There is no associated numbness and tingling. There is no subjective weakness. Prior treatments have included naproxen, but no physical therapy, ESIs or surgery.    The patient denies myelopathic symptoms such as handwriting changes or difficulty with buttons/coins/keys. Denies perineal paresthesias, bowel/bladder dysfunction.    PAST MEDICAL/SURGICAL HISTORY:  Past Medical History:   Diagnosis Date    GERD (gastroesophageal reflux disease)     TB (pulmonary tuberculosis) 1982    Treated CHNO 9 mo therapy.     History reviewed. No pertinent surgical history.    Current Medications:   Current Outpatient Prescriptions:     methylPREDNISolone (MEDROL DOSEPACK) 4 mg tablet, Take 1 tablet (4 mg total) by mouth once daily. Use as instructed on dose pack, Disp: 1 tablet, Rfl: 0    naproxen (NAPROSYN) 500 MG tablet, Take 1 tablet (500 mg total) by mouth 2 (two) times daily with meals., Disp: 30 tablet, Rfl: 0    Social History:   Social History     Social History    Marital status:      Spouse name: N/A    Number of children: N/A    Years of education: N/A     Occupational History    Not on file.     Social History Main Topics    Smoking status: Former Smoker     Packs/day: 1.50     Years: 2.00     Types: Cigarettes     Quit date: 4/3/1977    Smokeless tobacco: Never Used     Dr Brady does not see pts in clinic, he is only in the hospital.   Referral was sent by Nkechi COOLEY for pt to follow up with Dr James in July. Dr James has already been made aware of the hospitalization.     "Alcohol use 1.0 oz/week    Drug use: Not on file    Sexual activity: Not on file     Other Topics Concern    Not on file     Social History Narrative       REVIEW OF SYSTEMS:  Constitution: Negative. Negative for chills, fever and night sweats.   Cardiovascular: Negative for chest pain and syncope.   Respiratory: Negative for cough and shortness of breath.   Gastrointestinal: See HPI. Negative for nausea/vomiting. Negative for abdominal pain.  Genitourinary: See HPI. Negative for discoloration or dysuria.  Skin: Negative for dry skin, itching and rash.   Hematologic/Lymphatic: Negative for bleeding problem. Does not bruise/bleed easily.   Musculoskeletal: Negative for falls and muscle weakness.   Neurological: See HPI. No seizures.   Endocrine: Negative for polydipsia, polyphagia and polyuria.   Allergic/Immunologic: Negative for hives and persistent infections.    PHYSICAL EXAMINATION:    /81 (BP Location: Left arm, Patient Position: Sitting, BP Method: Automatic)  Pulse 68  Ht 5' 7" (1.702 m)  Wt 78.9 kg (173 lb 15.1 oz)  BMI 27.24 kg/m2    General: The patient is a very pleasant 61 y.o. male in no apparent distress, the patient is oriented to person, place and time.   Psych: Normal mood and affect  HEENT: Vision grossly intact, hearing intact to the spoken word.  Lungs: Respirations unlabored.  Gait: Normal station and gait, no difficulty with toe or heel walk.   Skin: Dorsal lumbar skin negative for rashes, lesions, hairy patches and surgical scars.  Range of motion: Lumbar range of motion is acceptable. There is no lumbar tenderness to palpation.  Spinal Balance: Global saggital and coronal spinal balance acceptable, no significant for scoliosis and kyphosis.  Musculoskeletal: No pain with the range of motion of the bilateral hips. No trochanteric tenderness to palpation.  Positive stinchfield's.  Vascular: Bilateral lower extremities warm and well perfused, Dorsalis pedis pulses 2+ " bilaterally.  Neurological: Normal strength and tone in all major motor groups in the bilateral lower extremities. Normal sensation to light touch in the L2-S1 dermatomes bilaterally.  Deep tendon reflexes symmetric 2+ in the bilateral lower extremities.  Negative Babinski bilaterally.  Straight leg raise negative bilaterally.     IMAGING:   Today I personally reviewed AP, Lat and Flex/Ex upright L-spine films that demonstrate mild degenerative changes of the lumbar spine.  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      ASSESSMENT/PLAN:    Diagnoses and all orders for this visit:    Right hip pain  -     MRI Lower Extremity Joint WO Cont Right; Future    The patient's primary complaint is right hip pain.  Will obtain an MRI of the right hip and refer to Dr. Broderick for further evaluation of his hip.          Return if symptoms worsen or fail to improve.

## 2024-07-19 ENCOUNTER — OFFICE VISIT (OUTPATIENT)
Dept: FAMILY MEDICINE | Facility: CLINIC | Age: 69
End: 2024-07-19
Payer: MEDICARE

## 2024-07-19 VITALS
DIASTOLIC BLOOD PRESSURE: 72 MMHG | TEMPERATURE: 97 F | HEIGHT: 67 IN | OXYGEN SATURATION: 98 % | BODY MASS INDEX: 29.38 KG/M2 | WEIGHT: 187.19 LBS | HEART RATE: 69 BPM | SYSTOLIC BLOOD PRESSURE: 118 MMHG

## 2024-07-19 DIAGNOSIS — E55.9 VITAMIN D DEFICIENCY: ICD-10-CM

## 2024-07-19 DIAGNOSIS — K22.2 LOWER ESOPHAGEAL RING (SCHATZKI): ICD-10-CM

## 2024-07-19 DIAGNOSIS — Z13.6 ENCOUNTER FOR ABDOMINAL AORTIC ANEURYSM (AAA) SCREENING: ICD-10-CM

## 2024-07-19 DIAGNOSIS — M54.2 NECK PAIN: ICD-10-CM

## 2024-07-19 DIAGNOSIS — Z91.89 10 YEAR RISK OF MI OR STROKE 7.5% OR GREATER: ICD-10-CM

## 2024-07-19 DIAGNOSIS — Z00.00 MEDICARE ANNUAL WELLNESS VISIT, SUBSEQUENT: Primary | ICD-10-CM

## 2024-07-19 DIAGNOSIS — H91.8X1 OTHER SPECIFIED HEARING LOSS OF RIGHT EAR, UNSPECIFIED HEARING STATUS ON CONTRALATERAL SIDE: ICD-10-CM

## 2024-07-19 DIAGNOSIS — Z23 NEED FOR VACCINATION AGAINST STREPTOCOCCUS PNEUMONIAE: ICD-10-CM

## 2024-07-19 DIAGNOSIS — R79.89 ABNORMAL CBC: ICD-10-CM

## 2024-07-19 DIAGNOSIS — E53.8 LOW SERUM VITAMIN B12: ICD-10-CM

## 2024-07-19 DIAGNOSIS — E78.49 OTHER HYPERLIPIDEMIA: ICD-10-CM

## 2024-07-19 DIAGNOSIS — Z79.899 ENCOUNTER FOR MONITORING LONG-TERM PROTON PUMP INHIBITOR THERAPY: ICD-10-CM

## 2024-07-19 DIAGNOSIS — K21.9 GERD WITHOUT ESOPHAGITIS: ICD-10-CM

## 2024-07-19 DIAGNOSIS — S16.1XXA STRAIN OF NECK MUSCLE, INITIAL ENCOUNTER: ICD-10-CM

## 2024-07-19 DIAGNOSIS — Z12.5 SCREENING PSA (PROSTATE SPECIFIC ANTIGEN): ICD-10-CM

## 2024-07-19 DIAGNOSIS — N52.9 ERECTILE DYSFUNCTION, UNSPECIFIED ERECTILE DYSFUNCTION TYPE: ICD-10-CM

## 2024-07-19 DIAGNOSIS — Z51.81 ENCOUNTER FOR MONITORING LONG-TERM PROTON PUMP INHIBITOR THERAPY: ICD-10-CM

## 2024-07-19 PROCEDURE — 99999PBSHW PR PBB SHADOW TECHNICAL ONLY FILED TO HB: Mod: PBBFAC,,,

## 2024-07-19 PROCEDURE — G0439 PPPS, SUBSEQ VISIT: HCPCS | Mod: ,,, | Performed by: FAMILY MEDICINE

## 2024-07-19 PROCEDURE — 90677 PCV20 VACCINE IM: CPT | Mod: PBBFAC,PO

## 2024-07-19 PROCEDURE — 99215 OFFICE O/P EST HI 40 MIN: CPT | Mod: PBBFAC,PO,25 | Performed by: FAMILY MEDICINE

## 2024-07-19 PROCEDURE — 99999 PR PBB SHADOW E&M-EST. PATIENT-LVL V: CPT | Mod: PBBFAC,,, | Performed by: FAMILY MEDICINE

## 2024-07-19 PROCEDURE — G0009 ADMIN PNEUMOCOCCAL VACCINE: HCPCS | Mod: PBBFAC,PO

## 2024-07-19 RX ORDER — TIZANIDINE 2 MG/1
2 TABLET ORAL NIGHTLY PRN
Qty: 90 TABLET | Refills: 0 | Status: SHIPPED | OUTPATIENT
Start: 2024-07-19

## 2024-07-19 RX ORDER — PANTOPRAZOLE SODIUM 40 MG/1
40 TABLET, DELAYED RELEASE ORAL DAILY
Qty: 90 TABLET | Refills: 3 | Status: SHIPPED | OUTPATIENT
Start: 2024-07-19 | End: 2025-07-19

## 2024-07-19 RX ORDER — DICLOFENAC SODIUM 10 MG/G
2 GEL TOPICAL 3 TIMES DAILY PRN
Qty: 450 G | Refills: 0 | Status: SHIPPED | OUTPATIENT
Start: 2024-07-19

## 2024-07-19 RX ORDER — EZETIMIBE 10 MG/1
10 TABLET ORAL DAILY
Qty: 90 TABLET | Refills: 3 | Status: SHIPPED | OUTPATIENT
Start: 2024-07-19 | End: 2025-07-19

## 2024-07-19 RX ORDER — IBUPROFEN 600 MG/1
600 TABLET ORAL
Qty: 30 TABLET | Refills: 3 | Status: SHIPPED | OUTPATIENT
Start: 2024-07-19

## 2024-07-19 RX ORDER — LANOLIN ALCOHOL/MO/W.PET/CERES
1000 CREAM (GRAM) TOPICAL DAILY
Qty: 90 TABLET | Refills: 5 | Status: SHIPPED | OUTPATIENT
Start: 2024-07-19

## 2024-07-19 RX ORDER — ERGOCALCIFEROL 1.25 MG/1
50000 CAPSULE ORAL
Qty: 12 CAPSULE | Refills: 3 | Status: SHIPPED | OUTPATIENT
Start: 2024-07-19

## 2024-07-19 RX ADMIN — PNEUMOCOCCAL 20-VALENT CONJUGATE VACCINE 0.5 ML
2.2; 2.2; 2.2; 2.2; 2.2; 2.2; 2.2; 2.2; 2.2; 2.2; 2.2; 2.2; 2.2; 2.2; 2.2; 2.2; 4.4; 2.2; 2.2; 2.2 INJECTION, SUSPENSION INTRAMUSCULAR at 09:07

## 2024-07-19 NOTE — PROGRESS NOTES
Subjective:       Patient ID: Alessandro Cerna is a 69 y.o. male.    Chief Complaint: Medicare AWV      Patient ID: Alessandro Cerna is a 69 y.o. male who presents today for a follow-up Medicare AWV today.     Abdominal aortic aneurysm screening: N/A  Alcohol misuse screening and counseling: N/A  Bone mass measurements: Not done  Cardiovascular disease screening laboratory tests: Lipid panel  Lab Results       Component                Value               Date                       LDLCALC                  108.8               08/17/2023                 LDLCALC                  121.8               06/29/2022                 LDLCALC                  61.2 (L)            06/10/2021            Colorectal Cancer Screening: Last Colonoscopy completed on 8/4/2021. Repeat 2026.   Depression screening: PHQ9 Score: 2  Diabetes/Nutrition: n/a  Glaucoma test: to see eye doctor, sees [facility].   Hepatitis C screening test: Lab Results       Component                Value               Date                       HEPCAB                   Non-reactive        09/21/2022            Influenza vaccine:   Lung cancer screening - Low dose computed tomography (LD-CT): N/A  Pneumococcal vaccination: ordered.   Prostate cancer screening (PSA): discussed risks and benefits with patient   Sexually transmitted infection (STI) screenings and high intensity behavioral counseling: discussed with patient  Opioid Screening: Patient medication list reviewed, patient is not taking prescription opioids. Patient is not using additional opioids than prescribed. Patient is at low risk of substance abuse based on this opioid use history.    Gender Specific:  Prostate cancer screening (PSA): discussed risks and benefits with patient      Last eye exam:   Glaucoma History: none    Safety Screening:   Help with the phone, transportation, shopping, meals, housework, laundry, medications, or managing money: No  Rugs at home in the hallway, lack  grab bars in the bathroom, lack handrails on the stairs, or have poor lighting: no stairs at home. No rugs int he hallway. No grab bars in the bathroom. Lighting is good.   Have you noticed any hearing difficulties: yes, has hearing loss and tinnitus in the right ear.     Have you previously been diagnosed with Diabetes? No   Do you experience any numbness or tingling in your arms and/or legs?  No   Do you experience a burning sensation in your hands and/or feet? No   Do you regularly experience pain in your thighs or calves on exertion (ie. walking)? No   Do you experience pain in your legs when elevated, but the pain lessens when not elevated (ie. in dependent position)? No   Do you have a persistent cough (for at least 3 months in two consecutive years)? No     Assistive Devices   Do you have an ostomy?  No   Do you have any implanted devices (such as prosthesis, pacemaker, insulin pump, etc.)? No   Do you currently use supplemental oxygen at home? No     Advanced Directives:  Full Code, POA: wants it to be his wife.    Social: patient has a cleaning company. He lives with his wife at home. Has a dog at home. No smokers at home. He has 3 kids; they lives nearby. 2 grandchildren (20 and 14). One great grandchild, he is almost one year old.     The following components were reviewed and updated:  Medical history, Family History, Social history, Allergies and Current Medications, Health Risk Assessment, Health Maintenance, Care Team    The following assessments were completed:  Depression Screening: PHQ9: see below  Cognitive function Screening: RCS Score:  see below  Timed Get Up Test: normal  Whisper Test: normal    NOT taking any medication.      GERD/history of h pylori/Schatzi Ring: had EGD, 2021. Saw GI. Swallowing has improved. On PPI.   DLD: not on statin. On zetia now because he wanted to change.   b12 def: likely due to PPI. Noted 7/2022.   Vitamin D: check today.     He has some neck pain. This started >6  "months. He continues to have pain. Voltaren gel didn't help. He reports pain is localized. No radiation down the arms. No  issues. No fevers or chills. He would like a muscle relaxer and ibuprofen. Declines PT. Declines referral to specialist.       Recommendations were developed using the USPSTF age appropriate recommendations. Education, counseling, and referrals were provided as needed.  After Visit Summary printed and given to patient which includes a list of additional screenings\tests needed.        Review of Systems   Constitutional:  Negative for chills and fever.   HENT:  Positive for hearing loss and tinnitus.    Respiratory:  Negative for chest tightness and shortness of breath.    Cardiovascular:  Negative for chest pain.   Neurological:  Negative for dizziness, light-headedness and headaches.         Health Maintenance Due   Topic Date Due    Shingles Vaccine (1 of 2) Never done    TETANUS VACCINE  10/25/2015    RSV Vaccine (Age 60+ and Pregnant patients) (1 - 1-dose 60+ series) Never done    Pneumococcal Vaccines (Age 65+) (2 of 2 - PCV) 06/01/2022    Abdominal Aortic Aneurysm Screening  Never done    COVID-19 Vaccine (3 - 2023-24 season) 09/01/2023     Immunization History   Administered Date(s) Administered    COVID-19, MRNA, LN-S, PF (MODERNA FULL 0.5 ML DOSE) 03/16/2021, 04/13/2021    Influenza (FLUAD) - Quadrivalent - Adjuvanted - PF *Preferred* (65+) 11/03/2022    Influenza - Quadrivalent - PF *Preferred* (6 months and older) 12/13/2021    Pneumococcal Polysaccharide - 23 Valent 06/11/2014    Td (ADULT) 10/25/2005           Objective:     Vitals:    07/19/24 0847   BP: 118/72   BP Location: Left arm   Patient Position: Sitting   BP Method: Large (Manual)   Pulse: 69   Temp: 97.2 °F (36.2 °C)   SpO2: 98%   Weight: 84.9 kg (187 lb 2.7 oz)   Height: 5' 7" (1.702 m)        Physical Exam  Constitutional:       General: He is not in acute distress.     Appearance: He is not ill-appearing, " toxic-appearing or diaphoretic.   HENT:      Right Ear: There is no impacted cerumen.      Left Ear: There is no impacted cerumen.      Nose: Congestion present. No rhinorrhea.      Mouth/Throat:      Pharynx: No oropharyngeal exudate or posterior oropharyngeal erythema.   Cardiovascular:      Rate and Rhythm: Normal rate and regular rhythm.   Pulmonary:      Effort: Pulmonary effort is normal.      Breath sounds: Normal breath sounds.   Abdominal:      Palpations: Abdomen is soft.      Tenderness: There is no abdominal tenderness.   Musculoskeletal:         General: Tenderness present. No swelling.      Comments: Tight and tender trapezius muscle, R>L   Neurological:      General: No focal deficit present.      Mental Status: He is alert.   Psychiatric:         Mood and Affect: Mood normal.         Behavior: Behavior normal.         Thought Content: Thought content normal.         Judgment: Judgment normal.         Assessment:       1. Medicare annual wellness visit, subsequent    2. Encounter for abdominal aortic aneurysm (AAA) screening    3. Need for vaccination against Streptococcus pneumoniae    4. Other specified hearing loss of right ear, unspecified hearing status on contralateral side    5. GERD without esophagitis    6. Lower esophageal ring (Schatzki)    7. Other hyperlipidemia    8. 10 year risk of MI or stroke 7.5% or greater    9. Vitamin D deficiency    10. Low serum vitamin B12    11. Screening PSA (prostate specific antigen)    12. Encounter for monitoring long-term proton pump inhibitor therapy    13. Abnormal CBC    14. Erectile dysfunction, unspecified erectile dysfunction type    15. Neck pain    16. Strain of neck muscle, initial encounter    17. BMI 29.0-29.9,adult        Plan:       Continue b12 daily, vitamin D weekly  Protonix daily  Zetia daily for cholesterol    Zanaflex nightly prn   Can be sedating  Ibuprofen TID WM.   Declined PT  Declined referral  XR     AAA screening    Declines  flonase.     F/u 1 year. Labs in August.       Medicare annual wellness visit, subsequent    Encounter for abdominal aortic aneurysm (AAA) screening  -     US AAA Screening; Future; Expected date: 07/19/2024    Need for vaccination against Streptococcus pneumoniae  -     pneumoc 20-adriano conj-dip cr(PF) (PREVNAR-20 (PF)) injection Syrg 0.5 mL    Other specified hearing loss of right ear, unspecified hearing status on contralateral side  -     Ambulatory referral/consult to ENT  -     Ambulatory referral/consult to Audiology    GERD without esophagitis  -     pantoprazole (PROTONIX) 40 MG tablet; Take 1 tablet (40 mg total) by mouth once daily.  Dispense: 90 tablet; Refill: 3  -     CBC Auto Differential; Future; Expected date: 07/19/2024  -     Comprehensive Metabolic Panel; Future; Expected date: 07/19/2024    Lower esophageal ring (Schatzki)  -     pantoprazole (PROTONIX) 40 MG tablet; Take 1 tablet (40 mg total) by mouth once daily.  Dispense: 90 tablet; Refill: 3    Other hyperlipidemia  -     ezetimibe (ZETIA) 10 mg tablet; Take 1 tablet (10 mg total) by mouth once daily.  Dispense: 90 tablet; Refill: 3  -     TSH; Future; Expected date: 07/19/2024  -     Lipid Panel; Future; Expected date: 07/19/2024    10 year risk of MI or stroke 7.5% or greater  -     ezetimibe (ZETIA) 10 mg tablet; Take 1 tablet (10 mg total) by mouth once daily.  Dispense: 90 tablet; Refill: 3    Vitamin D deficiency  -     ergocalciferol (ERGOCALCIFEROL) 50,000 unit Cap; Take 1 capsule (50,000 Units total) by mouth every 7 days.  Dispense: 12 capsule; Refill: 3    Low serum vitamin B12  -     cyanocobalamin (VITAMIN B-12) 1000 MCG tablet; Take 1 tablet (1,000 mcg total) by mouth once daily.  Dispense: 90 tablet; Refill: 5    Screening PSA (prostate specific antigen)  -     PSA, Screening; Future; Expected date: 07/19/2024    Encounter for monitoring long-term proton pump inhibitor therapy  -     Vitamin D; Future; Expected date:  07/19/2024  -     Vitamin B12; Future; Expected date: 07/19/2024    Abnormal CBC  -     Hemoglobin A1C; Future; Expected date: 07/19/2024    Erectile dysfunction, unspecified erectile dysfunction type  -     PSA, Screening; Future; Expected date: 07/19/2024    Neck pain  -     X-Ray Cervical Spine AP Lat with Flexion  Extension; Future; Expected date: 07/19/2024  -     ibuprofen (ADVIL,MOTRIN) 600 MG tablet; Take 1 tablet (600 mg total) by mouth 3 (three) times daily with meals.  Dispense: 30 tablet; Refill: 3  -     diclofenac sodium (VOLTAREN) 1 % Gel; Apply 2 g topically 3 (three) times daily as needed (Neck pain). Please print in Setswana  Dispense: 450 g; Refill: 0  -     tiZANidine (ZANAFLEX) 2 MG tablet; Take 1 tablet (2 mg total) by mouth nightly as needed.  Dispense: 90 tablet; Refill: 0    Strain of neck muscle, initial encounter  -     diclofenac sodium (VOLTAREN) 1 % Gel; Apply 2 g topically 3 (three) times daily as needed (Neck pain). Please print in Setswana  Dispense: 450 g; Refill: 0  -     tiZANidine (ZANAFLEX) 2 MG tablet; Take 1 tablet (2 mg total) by mouth nightly as needed.  Dispense: 90 tablet; Refill: 0    BMI 29.0-29.9,adult

## 2024-07-19 NOTE — PATIENT INSTRUCTIONS
Continue b12 daily, vitamin D weekly  Protonix daily  Zetia daily for cholesterol    Zanaflex nightly prn   Can be sedating  Ibuprofen TID WM.     F/u 1 year. Labs in August.

## 2024-07-25 DIAGNOSIS — N40.1 BPH WITH URINARY OBSTRUCTION: ICD-10-CM

## 2024-07-25 DIAGNOSIS — N52.01 ERECTILE DYSFUNCTION DUE TO ARTERIAL INSUFFICIENCY: ICD-10-CM

## 2024-07-25 DIAGNOSIS — N13.8 BPH WITH URINARY OBSTRUCTION: ICD-10-CM

## 2024-07-25 RX ORDER — TADALAFIL 20 MG/1
20 TABLET ORAL DAILY PRN
Qty: 20 TABLET | Refills: 11 | Status: SHIPPED | OUTPATIENT
Start: 2024-07-25

## 2024-07-25 NOTE — TELEPHONE ENCOUNTER
Care Due:                  Date            Visit Type   Department     Provider  --------------------------------------------------------------------------------                                EP -                              PRIMARY      [facility]  Last Visit: 07-      CARE ([facility])   [facility]       Jamaal  Johnha                              EP -                              [facility]  Next Visit: 07-      CARE ([facility])   MEDICINE       Jamaal  Madrecha                                                            Last  Test          Frequency    Reason                     Performed    Due Date  --------------------------------------------------------------------------------    CBC.........  12 months..  diclofenac, ibuprofen,     08- 08-                             valACYclovir.............    CMP.........  12 months..  diclofenac,                08- 08-                             ergocalciferol,                             ezetimibe, ibuprofen,                             valACYclovir.............    Lipid Panel.  12 months..  ezetimibe................  08- 08-    Vitamin D...  12 months..  ergocalciferol...........  08- 08-    [facility] Embedded Care Due Messages. Reference number: [id number].   7/25/2024 10:33:35 AM CDT

## 2024-07-25 NOTE — TELEPHONE ENCOUNTER
Refill Routing Note   Medication(s) are not appropriate for processing by [facility] for the following reason(s):        No active prescription written by provider    [facility] action(s):  Defer        Medication Therapy Plan: FLOS;  ON THE MED LIST 24.      Appointments  past 12m or future 3m with PCP    Date Provider   Last Visit   2024 Jamaal Cabrera DO   Next Visit   Visit date not found Jaamal Cabrera DO   ED visits in past 90 days: 0        Note composed:4:36 PM 2024

## 2024-07-26 ENCOUNTER — HOSPITAL ENCOUNTER (OUTPATIENT)
Dept: RADIOLOGY | Facility: HOSPITAL | Age: 69
Discharge: HOME | End: 2024-07-26
Attending: FAMILY MEDICINE
Payer: MEDICARE

## 2024-07-26 DIAGNOSIS — Z13.6 ENCOUNTER FOR ABDOMINAL AORTIC ANEURYSM (AAA) SCREENING: ICD-10-CM

## 2024-07-26 PROCEDURE — 76706 US ABDL AORTA SCREEN AAA: CPT | Mod: TC

## 2024-07-26 PROCEDURE — 76706 US ABDL AORTA SCREEN AAA: CPT | Mod: 26,,, | Performed by: STUDENT IN AN ORGANIZED HEALTH CARE EDUCATION/TRAINING PROGRAM

## 2024-08-20 ENCOUNTER — LAB VISIT (OUTPATIENT)
Dept: LAB | Facility: HOSPITAL | Age: 69
End: 2024-08-20
Attending: FAMILY MEDICINE
Payer: MEDICARE

## 2024-08-20 DIAGNOSIS — Z79.899 ENCOUNTER FOR MONITORING LONG-TERM PROTON PUMP INHIBITOR THERAPY: ICD-10-CM

## 2024-08-20 DIAGNOSIS — Z51.81 ENCOUNTER FOR MONITORING LONG-TERM PROTON PUMP INHIBITOR THERAPY: ICD-10-CM

## 2024-08-20 DIAGNOSIS — N52.9 ERECTILE DYSFUNCTION, UNSPECIFIED ERECTILE DYSFUNCTION TYPE: ICD-10-CM

## 2024-08-20 DIAGNOSIS — E78.49 OTHER HYPERLIPIDEMIA: ICD-10-CM

## 2024-08-20 DIAGNOSIS — K21.9 GERD WITHOUT ESOPHAGITIS: ICD-10-CM

## 2024-08-20 DIAGNOSIS — R79.89 ABNORMAL CBC: ICD-10-CM

## 2024-08-20 DIAGNOSIS — Z12.5 SCREENING PSA (PROSTATE SPECIFIC ANTIGEN): ICD-10-CM

## 2024-08-20 LAB
ALBUMIN SERPL BCP-MCNC: 4.1 G/DL (ref 3.5–5.2)
ALP SERPL-CCNC: 88 U/L (ref 55–135)
ALT SERPL W/O P-5'-P-CCNC: 21 U/L (ref 10–44)
ANION GAP SERPL CALC-SCNC: 14 MMOL/L (ref 8–16)
AST SERPL-CCNC: 28 U/L (ref 10–40)
BASOPHILS # BLD AUTO: 0.04 K/UL (ref 0–0.2)
BASOPHILS NFR BLD: 0.7 % (ref 0–1.9)
BILIRUB SERPL-MCNC: 0.9 MG/DL (ref 0.1–1)
BUN SERPL-MCNC: 13 MG/DL (ref 8–23)
CALCIUM SERPL-MCNC: 9.3 MG/DL (ref 8.7–10.5)
CHLORIDE SERPL-SCNC: 104 MMOL/L (ref 95–110)
CHOLEST SERPL-MCNC: 185 MG/DL (ref 120–199)
CHOLEST/HDLC SERPL: 5.6 {RATIO} (ref 2–5)
CO2 SERPL-SCNC: 23 MMOL/L (ref 23–29)
CREAT SERPL-MCNC: 1 MG/DL (ref 0.5–1.4)
DIFFERENTIAL METHOD BLD: ABNORMAL
EOSINOPHIL # BLD AUTO: 0.1 K/UL (ref 0–0.5)
EOSINOPHIL NFR BLD: 1.8 % (ref 0–8)
ERYTHROCYTE [DISTWIDTH] IN BLOOD BY AUTOMATED COUNT: 13.5 % (ref 11.5–14.5)
EST. GFR  (NO RACE VARIABLE): >60 ML/MIN/1.73 M^2
ESTIMATED AVG GLUCOSE: 97 MG/DL (ref 68–131)
GLUCOSE SERPL-MCNC: 85 MG/DL (ref 70–110)
HBA1C MFR BLD: 5 % (ref 4–5.6)
HCT VFR BLD AUTO: 42.6 % (ref 40–54)
HDLC SERPL-MCNC: 33 MG/DL (ref 40–75)
HDLC SERPL: 17.8 % (ref 20–50)
HGB BLD-MCNC: 14.4 G/DL (ref 14–18)
IMM GRANULOCYTES # BLD AUTO: 0 K/UL (ref 0–0.04)
IMM GRANULOCYTES NFR BLD AUTO: 0 % (ref 0–0.5)
LDLC SERPL CALC-MCNC: 122.6 MG/DL (ref 63–159)
LYMPHOCYTES # BLD AUTO: 2.9 K/UL (ref 1–4.8)
LYMPHOCYTES NFR BLD: 52.7 % (ref 18–48)
MCH RBC QN AUTO: 31.9 PG (ref 27–31)
MCHC RBC AUTO-ENTMCNC: 33.8 G/DL (ref 32–36)
MCV RBC AUTO: 95 FL (ref 82–98)
MONOCYTES # BLD AUTO: 0.5 K/UL (ref 0.3–1)
MONOCYTES NFR BLD: 8.7 % (ref 4–15)
NEUTROPHILS # BLD AUTO: 2 K/UL (ref 1.8–7.7)
NEUTROPHILS NFR BLD: 36.1 % (ref 38–73)
NONHDLC SERPL-MCNC: 152 MG/DL
NRBC BLD-RTO: 0 /100 WBC
PLATELET # BLD AUTO: 260 K/UL (ref 150–450)
PMV BLD AUTO: 9.8 FL (ref 9.2–12.9)
POTASSIUM SERPL-SCNC: 4.6 MMOL/L (ref 3.5–5.1)
PROT SERPL-MCNC: 7.5 G/DL (ref 6–8.4)
RBC # BLD AUTO: 4.51 M/UL (ref 4.6–6.2)
SODIUM SERPL-SCNC: 141 MMOL/L (ref 136–145)
TRIGL SERPL-MCNC: 147 MG/DL (ref 30–150)
TSH SERPL DL<=0.005 MIU/L-ACNC: 2.45 UIU/ML (ref 0.4–4)
WBC # BLD AUTO: 5.43 K/UL (ref 3.9–12.7)

## 2024-08-20 PROCEDURE — 80061 LIPID PANEL: CPT | Performed by: FAMILY MEDICINE

## 2024-08-20 PROCEDURE — 83036 HEMOGLOBIN GLYCOSYLATED A1C: CPT | Performed by: FAMILY MEDICINE

## 2024-08-20 PROCEDURE — 84153 ASSAY OF PSA TOTAL: CPT | Mod: GA | Performed by: FAMILY MEDICINE

## 2024-08-20 PROCEDURE — 84443 ASSAY THYROID STIM HORMONE: CPT | Performed by: FAMILY MEDICINE

## 2024-08-20 PROCEDURE — 36415 COLL VENOUS BLD VENIPUNCTURE: CPT | Mod: PO | Performed by: FAMILY MEDICINE

## 2024-08-20 PROCEDURE — 82306 VITAMIN D 25 HYDROXY: CPT | Performed by: FAMILY MEDICINE

## 2024-08-20 PROCEDURE — 80053 COMPREHEN METABOLIC PANEL: CPT | Performed by: FAMILY MEDICINE

## 2024-08-20 PROCEDURE — 85025 COMPLETE CBC W/AUTO DIFF WBC: CPT | Performed by: FAMILY MEDICINE

## 2024-08-20 PROCEDURE — 82607 VITAMIN B-12: CPT | Performed by: FAMILY MEDICINE

## 2024-08-21 LAB
25(OH)D3+25(OH)D2 SERPL-MCNC: 24 NG/ML (ref 30–96)
COMPLEXED PSA SERPL-MCNC: 1.6 NG/ML (ref 0–4)
VIT B12 SERPL-MCNC: 641 PG/ML (ref 210–950)

## 2024-09-10 ENCOUNTER — PATIENT MESSAGE (OUTPATIENT)
Dept: OTOLARYNGOLOGY | Facility: CLINIC | Age: 69
End: 2024-09-10
Payer: MEDICARE

## 2024-09-17 ENCOUNTER — OFFICE VISIT (OUTPATIENT)
Dept: OTOLARYNGOLOGY | Facility: CLINIC | Age: 69
End: 2024-09-17
Payer: MEDICARE

## 2024-09-17 ENCOUNTER — CLINICAL SUPPORT (OUTPATIENT)
Dept: OTOLARYNGOLOGY | Facility: CLINIC | Age: 69
End: 2024-09-17
Payer: MEDICARE

## 2024-09-17 VITALS
SYSTOLIC BLOOD PRESSURE: 117 MMHG | DIASTOLIC BLOOD PRESSURE: 78 MMHG | WEIGHT: 188.81 LBS | BODY MASS INDEX: 29.57 KG/M2 | HEART RATE: 62 BPM

## 2024-09-17 DIAGNOSIS — H93.13 TINNITUS AURIUM, BILATERAL: Chronic | ICD-10-CM

## 2024-09-17 DIAGNOSIS — H90.3 SENSORINEURAL HEARING LOSS, BILATERAL: Primary | ICD-10-CM

## 2024-09-17 DIAGNOSIS — H90.3 SENSORINEURAL HEARING LOSS (SNHL), BILATERAL: Primary | Chronic | ICD-10-CM

## 2024-09-17 PROCEDURE — 92557 COMPREHENSIVE HEARING TEST: CPT | Mod: PBBFAC,PN | Performed by: AUDIOLOGIST

## 2024-09-17 PROCEDURE — 99999 PR PBB SHADOW E&M-EST. PATIENT-LVL III: CPT | Mod: PBBFAC,,, | Performed by: OTOLARYNGOLOGY

## 2024-09-17 PROCEDURE — 92567 TYMPANOMETRY: CPT | Mod: PBBFAC,PN | Performed by: AUDIOLOGIST

## 2024-09-17 PROCEDURE — 99213 OFFICE O/P EST LOW 20 MIN: CPT | Mod: PBBFAC,PN | Performed by: OTOLARYNGOLOGY

## 2024-09-17 PROCEDURE — 99214 OFFICE O/P EST MOD 30 MIN: CPT | Mod: S$PBB,,, | Performed by: OTOLARYNGOLOGY

## 2024-09-17 NOTE — PROGRESS NOTES
Chief Complaint   Patient presents with    Hearing Loss     Hearing loss in both ears but no pain    .     HPI:  Alessandro Cerna is a very pleasant 69 y.o. male here to see me today for evaluation of hearing loss.  He was previously evaluated in  with [facility]. He reports hearing loss that has been gradually progressing over the last several  years.  He has noted any difference in hearing between the ears, with right ear being the worse hearing ear.  He has noted tinnitus in right ears.  He has not had any recent issues with ear pain or ear drainage.  He has not had any previous otologic surgery.  He admits to history of significant loud noise exposure.       Past Medical History:   Diagnosis Date    Chronic seasonal allergic rhinitis 2017    GERD (gastroesophageal reflux disease)     Hyperlipidemia     TB (pulmonary tuberculosis) 1982    Treated [facility] 9 mo therapy.     Social History     Socioeconomic History    Marital status:    Tobacco Use    Smoking status: Former     Current packs/day: 0.00     Average packs/day: 0.1 packs/day for 2.0 years (0.2 ttl pk-yrs)     Types: Cigarettes     Start date: 4/3/1975     Quit date: 4/3/1977     Years since quittin.4    Smokeless tobacco: Never    Tobacco comments:     5 cigs/week. x 2-3 years.   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 He lives with his wife at home. Has a dog at home. No smokers at home. He has 3 kids; they lives nearby. 2 grandchildren (20 and 14). One great grandchild, he is almost one year old.      Past Surgical History:   Procedure Laterality Date    COLONOSCOPY N/A 2021    Procedure: COLONOSCOPY   suprep;  Surgeon: Mo  JAQUELIN Blanco MD;  Location: [facility] ENDO;  Service: Endoscopy;  Laterality: N/A;  covid test; 08/01/2021(sunday) [facility] 2;00pm                          ANB    COLONOSCOPY W/ POLYPECTOMY  08/04/2021    ESOPHAGOGASTRODUODENOSCOPY N/A 8/4/2021    Procedure: EGD (ESOPHAGOGASTRODUODENOSCOPY);  Surgeon: Mo Blanco MD;  Location: [facility] ENDO;  Service: Endoscopy;  Laterality: N/A;    ESOPHAGOGASTRODUODENOSCOPY (EGD) WITH DILATION  08/04/2021    IRRIGATION AND DEBRIDEMENT OF LOWER EXTREMITY Left 12/30/2019    Procedure: IRRIGATION AND DEBRIDEMENT, LOWER EXTREMITY Bone biopsy heel;  Surgeon: Karo Marsh MD;  Location: [facility] OR;  Service: Podiatry;  Laterality: Left;     Family History   Problem Relation Name Age of Onset    Breast cancer Mother      Cancer Mother      Prostate cancer Father      Cancer Father      No Known Problems Sister      No Known Problems Brother      No Known Problems Brother      No Known Problems Brother      Emphysema Neg Hx           Review of Systems  General: negative for chills, fever or weight loss  Psychological: negative for mood changes or depression  Ophthalmic: negative for blurry vision, photophobia or eye pain  ENT: see HPI  Respiratory: no cough, shortness of breath, or wheezing  Cardiovascular: no chest pain or dyspnea on exertion  Gastrointestinal: no abdominal pain, change in bowel habits, or black/ bloody stools  Musculoskeletal: negative for gait disturbance or muscular weakness  Neurological: no syncope or seizures; no ataxia  Dermatological: negative for puritis,  rash and jaundice  Hematologic/lymphatic: no easy bruising, no new lumps or bumps      Physical Exam:    Vitals:    09/17/24 1040   BP: 117/78   Pulse: 62       Constitutional: Well appearing / communicating without difficutly.  NAD.  Eyes: EOM I Bilaterally  Head/Face: Normocephalic.  Negative paranasal sinus pressure/tenderness.  Salivary glands WNL.  House Brackmann I Bilaterally.    Right Ear: Auricle  normal appearance. External Auditory Canal within normal limits no lesions or masses,TM w/o masses/lesions/perforations. TM mobility noted.   Left Ear: Auricle normal appearance. External Auditory Canal within normal limits no lesions or masses,TM w/o masses/lesions/perforations. TM mobility noted.  Rinne Air conduction >bone conduction bilaterally, Fields midline.   Nose: No gross nasal septal deviation. Inferior Turbinates 3+ bilaterally. No septal perforation. No masses/lesions. External nasal skin appears normal without masses/lesions.  Oral Cavity: Gingiva/lips within normal limits.  Dentition/gingiva healthy appearing. Mucus membranes moist. Floor of mouth soft, no masses palpated. Oral Tongue mobile. Hard Palate appears normal.    Oropharynx: Base of tongue appears normal. No masses/lesions noted. Tonsillar fossa/pharyngeal wall without lesions. Posterior oropharynx WNL.  Soft palate without masses. Midline uvula.   Neck/Lymphatic: No LAD I-VI bilaterally.  No thyromegaly.  No masses noted on exam.      Diagnostic studies:  Audiogram interpreted personally by me and discussed in detail with the patient today.   Tympanometry revealed a Type A tympanogram for both ears. Audiogram results revealed a moderate to severe high frequency sensorineural hearing loss bilaterally. Speech reception thresholds were obtained at 20dB for both ears and speech discrimination scores were 72% for the right ear and 60% for the left ear.         Assessment:    ICD-10-CM ICD-9-CM    1. Sensorineural hearing loss (SNHL), bilateral  H90.3 389.18       2. Tinnitus aurium, bilateral  H93.13 388.31         The primary encounter diagnosis was Sensorineural hearing loss (SNHL), bilateral. A diagnosis of Tinnitus aurium, bilateral was also pertinent to this visit.      Plan:  No orders of the defined types were placed in this encounter.    We reviewed the patient's recent audiogram and hearing loss in detail.  We also discussed that he is a  good candidate for hearing aids, if and when he the patient is motivated.    We also discussed the use hearing protection when exposed to loud noise, including lawn equipment. Recommend audiogram in 1 year.     Thank you kindly for allowing me to participate in the patient's care.       Mikaela Solomon MD

## 2024-09-17 NOTE — PROGRESS NOTES
Alessandro Cerna was seen today in the clinic for an audiologic evaluation. His main complaint was hearing loss and buzzing in the right ear.    Tympanometry revealed a Type A tympanogram for both ears.  Audiogram results revealed a moderate to severe high frequency sensorineural hearing loss bilaterally.  Speech reception thresholds were obtained at 20dB for both ears and speech discrimination scores were 72% for the right ear and 60% for the left ear.    Recommendations:  Otologic evaluation  Annual evaluation  Hearing aid consult when ready  Hearing protection in noise

## 2024-12-30 ENCOUNTER — OFFICE VISIT (OUTPATIENT)
Dept: FAMILY MEDICINE | Facility: CLINIC | Age: 69
End: 2024-12-30
Payer: MEDICARE

## 2024-12-30 VITALS
WEIGHT: 190.25 LBS | HEART RATE: 66 BPM | HEIGHT: 67 IN | OXYGEN SATURATION: 98 % | SYSTOLIC BLOOD PRESSURE: 108 MMHG | BODY MASS INDEX: 29.86 KG/M2 | DIASTOLIC BLOOD PRESSURE: 74 MMHG

## 2024-12-30 DIAGNOSIS — I49.1: Primary | ICD-10-CM

## 2024-12-30 PROCEDURE — 3288F FALL RISK ASSESSMENT DOCD: CPT | Mod: CPTII,S$GLB,,

## 2024-12-30 PROCEDURE — 3078F DIAST BP <80 MM HG: CPT | Mod: CPTII,S$GLB,,

## 2024-12-30 PROCEDURE — 1159F MED LIST DOCD IN RCRD: CPT | Mod: CPTII,S$GLB,,

## 2024-12-30 PROCEDURE — 1126F AMNT PAIN NOTED NONE PRSNT: CPT | Mod: CPTII,S$GLB,,

## 2024-12-30 PROCEDURE — 1101F PT FALLS ASSESS-DOCD LE1/YR: CPT | Mod: CPTII,S$GLB,,

## 2024-12-30 PROCEDURE — 3008F BODY MASS INDEX DOCD: CPT | Mod: CPTII,S$GLB,,

## 2024-12-30 PROCEDURE — 3074F SYST BP LT 130 MM HG: CPT | Mod: CPTII,S$GLB,,

## 2024-12-30 PROCEDURE — 93005 ELECTROCARDIOGRAM TRACING: CPT | Mod: S$GLB,,,

## 2024-12-30 PROCEDURE — 1160F RVW MEDS BY RX/DR IN RCRD: CPT | Mod: CPTII,S$GLB,,

## 2024-12-30 PROCEDURE — 99999 PR PBB SHADOW E&M-EST. PATIENT-LVL IV: CPT | Mod: PBBFAC,,,

## 2024-12-30 PROCEDURE — 93010 ELECTROCARDIOGRAM REPORT: CPT | Mod: S$GLB,,, | Performed by: INTERNAL MEDICINE

## 2024-12-30 PROCEDURE — 99214 OFFICE O/P EST MOD 30 MIN: CPT | Mod: S$GLB,,,

## 2024-12-30 NOTE — PROGRESS NOTES
[facility]    12/30/2024      Subjective:       Patient ID:  Alessandro is a 69 y.o. male .  has a past medical history of Chronic seasonal allergic rhinitis, GERD (gastroesophageal reflux disease), Hyperlipidemia, and TB (pulmonary tuberculosis).    History of Present Illness    CHIEF COMPLAINT:  Mr. Cerna presents today with palpitations and discomfort in the chest.    HISTORY OF PRESENT ILLNESS:  He reports experiencing palpitations and chest discomfort for approximately one week. He describes a sensation of rapid heartbeat with associated discomfort but denies pain or shortness of breath. The palpitations are not triggered by specific activities. He continues daily coffee consumption and reports work-related stress.Pt is not in any acute distress at the moment and does not endorse any sob headache, chest pain, feer dizziness or nausea.       ROS:  General: -fever, -chills, -fatigue, -weight gain, -weight loss  Eyes: -vision changes, -redness, -discharge  ENT: -ear pain, -nasal congestion, -sore throat  Cardiovascular: chest pain, +palpitations, -lower extremity edema  Respiratory: -cough, -shortness of breath  Gastrointestinal: -abdominal pain, -nausea, -vomiting, -diarrhea, -constipation, -blood in stool  Genitourinary: -dysuria, -hematuria, -frequency  Musculoskeletal: -joint pain, -muscle pain  Skin: -rash, -lesion  Neurological: -headache, -dizziness, -numbness, -tingling  Psychiatric: -anxiety, -depression, -sleep difficulty             Patient Active Problem List   Diagnosis    Late effects of respiratory tuberculosis    Bilateral hip pain    Bilateral shoulder pain    Erectile dysfunction    Chronic seasonal allergic rhinitis    Edema of left foot    GERD without esophagitis    BMI 29.0-29.9,adult    Other hyperlipidemia    10 year risk of MI or stroke 7.5% or greater    Vitamin D deficiency    Lower esophageal ring (Schatzki)    Grade I hemorrhoids    Low serum vitamin  B12    History of cellulitis         Last HgbA1C:    Lab Results   Component Value Date    HGBA1C 5.0 08/20/2024    HGBA1C 5.1 06/29/2022    HGBA1C 5.2 06/10/2021         Last Lipid Panel:    Lab Results   Component Value Date    HDL 33 (L) 08/20/2024    HDL 36 (L) 08/17/2023    HDL 36 (L) 06/29/2022       Lab Results   Component Value Date    LDLCALC 122.6 08/20/2024    LDLCALC 108.8 08/17/2023    LDLCALC 121.8 06/29/2022       Lab Results   Component Value Date    TRIG 147 08/20/2024    TRIG 121 08/17/2023    TRIG 116 06/29/2022       Lab Results   Component Value Date    CHOLHDL 17.8 (L) 08/20/2024    CHOLHDL 21.3 08/17/2023    CHOLHDL 19.9 (L) 06/29/2022         Review of patient's allergies indicates:  No Known Allergies     Medication List with Changes/Refills   Current Medications    B COMPLEX VITAMINS CAPSULE    Take 1 capsule by mouth once daily.    CYANOCOBALAMIN (VITAMIN B-12) 1000 MCG TABLET    Take 1 tablet (1,000 mcg total) by mouth once daily.    DICLOFENAC SODIUM (VOLTAREN) 1 % GEL    Apply 2 g topically 3 (three) times daily as needed (Neck pain). Please print in Cambodian    ERGOCALCIFEROL (ERGOCALCIFEROL) 50,000 UNIT CAP    Take 1 capsule (50,000 Units total) by mouth every 7 days.    EZETIMIBE (ZETIA) 10 MG TABLET    Take 1 tablet (10 mg total) by mouth once daily.    IBUPROFEN (ADVIL,MOTRIN) 600 MG TABLET    Take 1 tablet (600 mg total) by mouth 3 (three) times daily with meals.    PANTOPRAZOLE (PROTONIX) 40 MG TABLET    Take 1 tablet (40 mg total) by mouth once daily.    TADALAFIL (CIALIS) 20 MG TAB    Take 1 tablet (20 mg total) by mouth daily as needed (take 30-60 minutes before on an empty stomach).    TIZANIDINE (ZANAFLEX) 2 MG TABLET    Take 1 tablet (2 mg total) by mouth nightly as needed.    VALACYCLOVIR (VALTREX) 1000 MG TABLET    Take 1 tablet (1,000 mg total) by mouth 2 (two) times daily. for 10 days               Objective:      /74 (BP Location: Right arm, Patient Position:  "Sitting)   Pulse 66   Ht 5' 7" (1.702 m)   Wt 86.3 kg (190 lb 4.1 oz)   SpO2 98%   BMI 29.80 kg/m²   Estimated body mass index is 29.8 kg/m² as calculated from the following:    Height as of this encounter: 5' 7" (1.702 m).    Weight as of this encounter: 86.3 kg (190 lb 4.1 oz).    Physical Exam  Vitals reviewed.   Constitutional:       Appearance: Normal appearance.   HENT:      Head: Normocephalic.      Right Ear: Tympanic membrane normal.      Left Ear: Tympanic membrane normal.      Nose: Nose normal.      Mouth/Throat:      Mouth: Mucous membranes are moist.   Eyes:      Conjunctiva/sclera: Conjunctivae normal.      Pupils: Pupils are equal, round, and reactive to light.   Cardiovascular:      Rate and Rhythm: Normal rate and regular rhythm.      Comments: PVC's on EKG  Pulmonary:      Effort: No respiratory distress.      Breath sounds: Normal breath sounds.   Abdominal:      Tenderness: There is no abdominal tenderness.   Musculoskeletal:      Cervical back: Normal range of motion.   Skin:     General: Skin is warm.   Neurological:      Mental Status: He is oriented to person, place, and time.   Psychiatric:         Mood and Affect: Mood normal.           Assessment and Plan:     Alessandro was seen today for irregular heart beat.    Diagnoses and all orders for this visit:    Premature supraventricular beat  -     IN OFFICE EKG 12-LEAD (to [place])  -     Cancel: Ambulatory referral/consult to Cardiology; Future  -     Holter monitor - 72 hour; Future    Abnormal EKG          Assessment & Plan    - Evaluated patient's complaint of intermittent fast heartbeat and burping for past 5-6 days  - Performed physical exam and EKG,   - EKG revealed premature ventricular contractions (PVCs)  - Considered caffeine intake and recent stress as potential contributors to symptoms  - Assessed patient as currently stable, but warranting further cardiac evaluation   Explained PVCs as premature heart contractions that can feel " like skipped beats.   Discussed potential relationship between caffeine, stress, and cardiac symptoms.   Mr. Cerna to discontinue caffeine intake temporarily.   Mr. Cerna to monitor symptoms, especially in relation to stress levels.  Will put in order for halter monitor and will follow up accordingly.   Follow up in 3 weeks  with Rita UMAÑA     GENERAL FOLLOW-UP:   Mr. Cerna to seek emergency care for persistent chest pain, shortness of breath, or severe headache.   Contact the office if symptoms worsen or new concerns arise.         The patient was informed of the following statements     Emergency Care:Seek immediate medical attention in the emergency room if you experience any new or worsening symptoms, or if your current condition significantly changes or becomes more severe.  Patient Acknowledgment: Patient verbalizes understanding of the plan and agrees to proceed with the recommended care.    Follow Up:       No follow-ups on file.    Other Orders Placed This Visit:  Orders Placed This Encounter   Procedures    Holter monitor - 72 hour    IN OFFICE EKG 12-LEAD (to [place])         This note was generated with the assistance of ambient listening technology. Verbal consent was obtained by the patient and accompanying visitor(s) for the recording of patient appointment to facilitate this note. I attest to having reviewed and edited the generated note for accuracy, though some syntax or spelling errors may persist. Please contact the author of this note for any clarification.    I spent a total of 35 minutes on the day of the visit.This includes face to face time and non-face to face time preparing to see the patient (eg, review of tests), obtaining and/or reviewing separately obtained history, documenting clinical information in the electronic or other health record, independently interpreting results and communicating results to the patient/family/caregiver, or care coordinator.      Wilian Koenig,  ROGELIO

## 2025-01-02 LAB
OHS QRS DURATION: 104 MS
OHS QTC CALCULATION: 438 MS

## 2025-01-10 ENCOUNTER — HOSPITAL ENCOUNTER (OUTPATIENT)
Dept: CARDIOLOGY | Facility: HOSPITAL | Age: 70
Discharge: HOME | End: 2025-01-10
Payer: MEDICARE

## 2025-01-10 DIAGNOSIS — I49.1: ICD-10-CM

## 2025-01-10 PROCEDURE — 93227 XTRNL ECG REC<48 HR R&I: CPT | Mod: ,,, | Performed by: INTERNAL MEDICINE

## 2025-01-10 PROCEDURE — 93225 XTRNL ECG REC<48 HRS REC: CPT | Mod: PO

## 2025-01-14 LAB
OHS CV EVENT MONITOR DAY: 0
OHS CV HOLTER LENGTH DECIMAL HOURS: 47.98
OHS CV HOLTER LENGTH HOURS: 47
OHS CV HOLTER LENGTH MINUTES: 59
OHS CV HOLTER SINUS AVERAGE HR: 71
OHS CV HOLTER SINUS MAX HR: 124
OHS CV HOLTER SINUS MIN HR: 55

## 2025-01-16 ENCOUNTER — TELEPHONE (OUTPATIENT)
Dept: FAMILY MEDICINE | Facility: CLINIC | Age: 70
End: 2025-01-16
Payer: MEDICARE

## 2025-01-16 DIAGNOSIS — I49.3 PVC'S (PREMATURE VENTRICULAR CONTRACTIONS): Primary | ICD-10-CM

## 2025-01-16 NOTE — TELEPHONE ENCOUNTER
----- Message from UMESH Holden sent at 1/14/2025  4:03 PM CST -----  Regarding: Seeing youo next week  Will be seeing you next week, Referral to Cardiology may be warranted  ----- Message -----  From: Surinder Corbin MD  Sent: 1/14/2025   2:43 PM CST  To: UMESH Yap

## 2025-02-05 ENCOUNTER — OFFICE VISIT (OUTPATIENT)
Dept: FAMILY MEDICINE | Facility: CLINIC | Age: 70
End: 2025-02-05
Payer: MEDICARE

## 2025-02-05 VITALS
SYSTOLIC BLOOD PRESSURE: 110 MMHG | HEART RATE: 68 BPM | BODY MASS INDEX: 29.48 KG/M2 | DIASTOLIC BLOOD PRESSURE: 64 MMHG | OXYGEN SATURATION: 98 % | HEIGHT: 67 IN | WEIGHT: 187.81 LBS

## 2025-02-05 DIAGNOSIS — K21.9 GERD WITHOUT ESOPHAGITIS: ICD-10-CM

## 2025-02-05 DIAGNOSIS — K22.2 LOWER ESOPHAGEAL RING (SCHATZKI): ICD-10-CM

## 2025-02-05 DIAGNOSIS — N13.8 BPH WITH URINARY OBSTRUCTION: ICD-10-CM

## 2025-02-05 DIAGNOSIS — Z91.89 10 YEAR RISK OF MI OR STROKE 7.5% OR GREATER: ICD-10-CM

## 2025-02-05 DIAGNOSIS — N52.01 ERECTILE DYSFUNCTION DUE TO ARTERIAL INSUFFICIENCY: ICD-10-CM

## 2025-02-05 DIAGNOSIS — E55.9 VITAMIN D DEFICIENCY: ICD-10-CM

## 2025-02-05 DIAGNOSIS — E78.49 OTHER HYPERLIPIDEMIA: ICD-10-CM

## 2025-02-05 DIAGNOSIS — R00.2 PALPITATIONS: Primary | ICD-10-CM

## 2025-02-05 DIAGNOSIS — N40.1 BPH WITH URINARY OBSTRUCTION: ICD-10-CM

## 2025-02-05 PROCEDURE — 1160F RVW MEDS BY RX/DR IN RCRD: CPT | Mod: CPTII,S$GLB,,

## 2025-02-05 PROCEDURE — 3078F DIAST BP <80 MM HG: CPT | Mod: CPTII,S$GLB,,

## 2025-02-05 PROCEDURE — 99999 PR PBB SHADOW E&M-EST. PATIENT-LVL IV: CPT | Mod: PBBFAC,,,

## 2025-02-05 PROCEDURE — 99214 OFFICE O/P EST MOD 30 MIN: CPT | Mod: S$GLB,,,

## 2025-02-05 PROCEDURE — G2211 COMPLEX E/M VISIT ADD ON: HCPCS | Mod: S$GLB,,,

## 2025-02-05 PROCEDURE — 3008F BODY MASS INDEX DOCD: CPT | Mod: CPTII,S$GLB,,

## 2025-02-05 PROCEDURE — 3074F SYST BP LT 130 MM HG: CPT | Mod: CPTII,S$GLB,,

## 2025-02-05 PROCEDURE — 1101F PT FALLS ASSESS-DOCD LE1/YR: CPT | Mod: CPTII,S$GLB,,

## 2025-02-05 PROCEDURE — 3288F FALL RISK ASSESSMENT DOCD: CPT | Mod: CPTII,S$GLB,,

## 2025-02-05 PROCEDURE — 1159F MED LIST DOCD IN RCRD: CPT | Mod: CPTII,S$GLB,,

## 2025-02-05 PROCEDURE — 1126F AMNT PAIN NOTED NONE PRSNT: CPT | Mod: CPTII,S$GLB,,

## 2025-02-05 RX ORDER — PANTOPRAZOLE SODIUM 40 MG/1
40 TABLET, DELAYED RELEASE ORAL DAILY
Qty: 90 TABLET | Refills: 3 | Status: SHIPPED | OUTPATIENT
Start: 2025-02-05 | End: 2026-02-05

## 2025-02-05 RX ORDER — TADALAFIL 20 MG/1
20 TABLET ORAL DAILY PRN
Qty: 20 TABLET | Refills: 11 | Status: SHIPPED | OUTPATIENT
Start: 2025-02-05

## 2025-02-05 RX ORDER — EZETIMIBE 10 MG/1
10 TABLET ORAL DAILY
Qty: 90 TABLET | Refills: 3 | Status: SHIPPED | OUTPATIENT
Start: 2025-02-05 | End: 2026-02-05

## 2025-02-05 RX ORDER — ERGOCALCIFEROL 1.25 MG/1
50000 CAPSULE ORAL
Qty: 12 CAPSULE | Refills: 3 | Status: SHIPPED | OUTPATIENT
Start: 2025-02-05

## 2025-02-05 NOTE — PROGRESS NOTES
"Subjective     Patient ID: Alessandro Cerna is a 69 y.o. male.    Chief Complaint: Follow-up    70 y/o male with history of Chronic seasonal allergic rhinitis, GERD (gastroesophageal reflux disease), Hyperlipidemia, and TB (pulmonary tuberculosis) presents to clinic for f/u.    He was seen in clinic last month for palpitations  EKG showed PVCs  Holter ordered   Has an appt with cardiology next month     States that this happens 1-2 x per week. They last 5-10 min. No CP, SOB with them. Mailnly when laying on left side     Holter:     Rhythm    Predominant Rhythm  Sinus rhythm with heart rates varying between 55 and 124 BPM with an average of 71BPM.     Maximum heart rate recorded at: 08:31 CST .     Minimum heart rate recorded at 00:47 CST.     PVC    Ventricular Arrhythmias  There were occasional PVCs totalling 1640 and averaging 34.18 per hour. The ventricular arrhythmias percentage was 0.9.     PAC    Supraventricular Arrhythmias  There were occasional PACs totalling 1900 and averaging 39.6 per hour.       Has not been taking Zetia     Labs reviewed    HPI  Review of Systems       Objective     Vitals:    02/05/25 1405   BP: 110/64   Pulse: 68   SpO2: 98%   Weight: 85.2 kg (187 lb 13.3 oz)   Height: 5' 7" (1.702 m)   PainSc: 0-No pain      Physical Exam         Assessment and Plan     1. Palpitations    2. Other hyperlipidemia  -     ezetimibe (ZETIA) 10 mg tablet; Take 1 tablet (10 mg total) by mouth once daily.  Dispense: 90 tablet; Refill: 3    3. Vitamin D deficiency  -     ergocalciferol (ERGOCALCIFEROL) 50,000 unit Cap; Take 1 capsule (50,000 Units total) by mouth every 7 days.  Dispense: 12 capsule; Refill: 3    4. GERD without esophagitis  -     pantoprazole (PROTONIX) 40 MG tablet; Take 1 tablet (40 mg total) by mouth once daily.  Dispense: 90 tablet; Refill: 3    5. Lower esophageal ring (Schatzki)  -     pantoprazole (PROTONIX) 40 MG tablet; Take 1 tablet (40 mg total) by mouth once daily.  " Dispense: 90 tablet; Refill: 3    6. BPH with urinary obstruction  -     tadalafiL (CIALIS) 20 MG Tab; Take 1 tablet (20 mg total) by mouth daily as needed (take 30-60 minutes before on an empty stomach).  Dispense: 20 tablet; Refill: 11    7. Erectile dysfunction due to arterial insufficiency  -     tadalafiL (CIALIS) 20 MG Tab; Take 1 tablet (20 mg total) by mouth daily as needed (take 30-60 minutes before on an empty stomach).  Dispense: 20 tablet; Refill: 11    8. 10 year risk of MI or stroke 7.5% or greater  -     ezetimibe (ZETIA) 10 mg tablet; Take 1 tablet (10 mg total) by mouth once daily.  Dispense: 90 tablet; Refill: 3        Restart Zetia  Start Vit D weekly   Continue pantoprazole  Continue B-12    Appt with Cardiology in March  Appt with Dr Cabrera in July       30 minutes of total time spent on the encounter, which includes face to face time and non-face to face time preparing to see the patient (eg, review of tests), Obtaining and/or reviewing separately obtained history, Documenting clinical information in the electronic or other health record, Independently interpreting results (not separately reported) and communicating results to the patient/family/caregiver, or Care coordination (not separately reported).      Rita Chan PA-C  Family Practice/Internal Medicine   Office Phone: [phone]

## 2025-02-05 NOTE — PATIENT INSTRUCTIONS
Restart Zetia  Start Vit D weekly   Continue pantoprazole  Continue B-12    Appt with Cardiology in March  Appt with Dr Cabrera in July

## 2025-03-05 ENCOUNTER — TELEPHONE (OUTPATIENT)
Dept: ELECTROPHYSIOLOGY | Facility: CLINIC | Age: 70
End: 2025-03-05
Payer: MEDICARE

## 2025-03-05 DIAGNOSIS — I49.3 PVC (PREMATURE VENTRICULAR CONTRACTION): Primary | ICD-10-CM

## 2025-03-07 ENCOUNTER — OFFICE VISIT (OUTPATIENT)
Dept: CARDIOLOGY | Facility: CLINIC | Age: 70
End: 2025-03-07
Payer: MEDICARE

## 2025-03-07 VITALS — BODY MASS INDEX: 28.56 KG/M2 | WEIGHT: 182 LBS | HEIGHT: 67 IN

## 2025-03-07 DIAGNOSIS — R00.2 PALPITATIONS: Primary | ICD-10-CM

## 2025-03-07 DIAGNOSIS — I49.3 PVC'S (PREMATURE VENTRICULAR CONTRACTIONS): ICD-10-CM

## 2025-03-07 PROCEDURE — 99999 PR PBB SHADOW E&M-EST. PATIENT-LVL III: CPT | Mod: PBBFAC,,, | Performed by: INTERNAL MEDICINE

## 2025-03-07 NOTE — PROGRESS NOTES
Subjective   Patient ID:  Alessandro Cerna is a 69 y.o. male who presents for evaluation of Palpitations    Primary Care Physician: Jamaal Cabrera DO  Referring Provider: Rita Chan PA-C    HPI  I had the pleasure of seeing Mr Cerna today in our electrophysiology clinic in consultation for his palpitations. As you are aware he is a pleasant 69 year-old man with no significant cardiac history who recently began having palpitations. A holter monitor was ordered noting a 0.9% PVC and 1% PAC burden (reviewed myself). Average sinus rate was 71 bpm. He is referred for discussion.    I reviewed available ECGs in EPIC which show sinus rhythm. Recent ECG showed a PAC and a PVC (RB, V3 transition, superior axis)    Review of Systems   Constitutional: Negative for fever and malaise/fatigue.   HENT:  Negative for congestion and sore throat.    Eyes:  Negative for blurred vision and visual disturbance.   Cardiovascular:  Positive for palpitations. Negative for chest pain, dyspnea on exertion, irregular heartbeat, near-syncope and syncope.   Respiratory:  Negative for cough and shortness of breath.    Hematologic/Lymphatic: Negative for bleeding problem. Does not bruise/bleed easily.   Skin: Negative.    Musculoskeletal: Negative.    Gastrointestinal:  Negative for bloating, abdominal pain, hematochezia and melena.   Neurological:  Negative for focal weakness and weakness.   Psychiatric/Behavioral: Negative.            Objective     Physical Exam  Vitals reviewed.   Constitutional:       General: He is not in acute distress.     Appearance: He is well-developed. He is not diaphoretic.   HENT:      Head: Normocephalic and atraumatic.   Eyes:      General:         Right eye: No discharge.         Left eye: No discharge.      Conjunctiva/sclera: Conjunctivae normal.   Cardiovascular:      Rate and Rhythm: Normal rate and regular rhythm. Occasional Extrasystoles are present.     Heart sounds: No murmur heard.     No  friction rub. No gallop.   Pulmonary:      Effort: Pulmonary effort is normal. No respiratory distress.      Breath sounds: Normal breath sounds. No wheezing or rales.   Abdominal:      General: Bowel sounds are normal. There is no distension.      Palpations: Abdomen is soft.      Tenderness: There is no abdominal tenderness.   Musculoskeletal:      Cervical back: Neck supple.   Skin:     General: Skin is warm and dry.   Neurological:      Mental Status: He is alert and oriented to person, place, and time.   Psychiatric:         Behavior: Behavior normal.         Thought Content: Thought content normal.         Judgment: Judgment normal.            Assessment and Plan     1. Palpitations    2. PVC's (premature ventricular contractions)        Plan:  In summary, Mr Cerna is a pleasant 69 year-old man with no significant cardiac history who recently began having palpitations with 0.9% PVC and 1% PAC burden. Discussed benign nature of this. Discussed only treatment indication is for symptoms. He feels better after our discussion and elects to not start any treatment. He will reach out to me if his symptoms worsen.    Thank you for allowing me to participate in the care of this patient. Please do not hesitate to call me with any questions or concerns.    Surinder Corbin MD, PhD  Cardiac Electrophysiology

## 2025-03-12 DIAGNOSIS — N52.01 ERECTILE DYSFUNCTION DUE TO ARTERIAL INSUFFICIENCY: ICD-10-CM

## 2025-03-12 DIAGNOSIS — N13.8 BPH WITH URINARY OBSTRUCTION: ICD-10-CM

## 2025-03-12 DIAGNOSIS — N40.1 BPH WITH URINARY OBSTRUCTION: ICD-10-CM

## 2025-03-12 NOTE — TELEPHONE ENCOUNTER
No care due was identified.  [facility] Embedded Care Due Messages. Reference number: [id number].   3/12/2025 2:42:44 PM CDT

## 2025-03-13 RX ORDER — TADALAFIL 20 MG/1
20 TABLET ORAL DAILY PRN
Qty: 20 TABLET | Refills: 5 | Status: SHIPPED | OUTPATIENT
Start: 2025-03-13

## 2025-03-13 NOTE — TELEPHONE ENCOUNTER
Refill Routing Note   Medication(s) are not appropriate for processing by [facility] for the following reason(s):        No active prescription written by provider    [facility] action(s):  Defer             Appointments  past 12m or future 3m with PCP    Date Provider   Last Visit   7/19/2024 Jamaal Cabrera DO   Next Visit   7/31/2025 Jamaal Cabrera,    ED visits in past 90 days: 0        Note composed:9:08 PM 03/12/2025

## 2025-05-10 ENCOUNTER — HOSPITAL ENCOUNTER (EMERGENCY)
Facility: HOSPITAL | Age: 70
Discharge: HOME | End: 2025-05-10
Attending: STUDENT IN AN ORGANIZED HEALTH CARE EDUCATION/TRAINING PROGRAM
Payer: MEDICARE

## 2025-05-10 VITALS
WEIGHT: 180 LBS | TEMPERATURE: 99 F | DIASTOLIC BLOOD PRESSURE: 74 MMHG | HEIGHT: 67 IN | HEART RATE: 80 BPM | OXYGEN SATURATION: 96 % | RESPIRATION RATE: 16 BRPM | BODY MASS INDEX: 28.25 KG/M2 | SYSTOLIC BLOOD PRESSURE: 114 MMHG

## 2025-05-10 DIAGNOSIS — K52.9 GASTROENTERITIS: Primary | ICD-10-CM

## 2025-05-10 PROCEDURE — 25000003 PHARM REV CODE 250: Performed by: STUDENT IN AN ORGANIZED HEALTH CARE EDUCATION/TRAINING PROGRAM

## 2025-05-10 PROCEDURE — 99283 EMERGENCY DEPT VISIT LOW MDM: CPT

## 2025-05-10 RX ORDER — ALUMINUM HYDROXIDE, MAGNESIUM HYDROXIDE, AND SIMETHICONE 1200; 120; 1200 MG/30ML; MG/30ML; MG/30ML
30 SUSPENSION ORAL ONCE
Status: COMPLETED | OUTPATIENT
Start: 2025-05-10 | End: 2025-05-10

## 2025-05-10 RX ORDER — LIDOCAINE HYDROCHLORIDE 20 MG/ML
15 SOLUTION OROPHARYNGEAL ONCE
Status: COMPLETED | OUTPATIENT
Start: 2025-05-10 | End: 2025-05-10

## 2025-05-10 RX ORDER — ONDANSETRON 8 MG/1
8 TABLET, ORALLY DISINTEGRATING ORAL 3 TIMES DAILY PRN
Qty: 20 TABLET | Refills: 0 | Status: SHIPPED | OUTPATIENT
Start: 2025-05-10

## 2025-05-10 RX ORDER — ONDANSETRON 4 MG/1
4 TABLET, ORALLY DISINTEGRATING ORAL
Status: COMPLETED | OUTPATIENT
Start: 2025-05-10 | End: 2025-05-10

## 2025-05-10 RX ORDER — LOPERAMIDE HYDROCHLORIDE AND SIMETHICONE 2; 125 MG/1; MG/1
1 TABLET ORAL 3 TIMES DAILY PRN
Qty: 20 EACH | Refills: 0 | Status: SHIPPED | OUTPATIENT
Start: 2025-05-10 | End: 2025-05-20

## 2025-05-10 RX ADMIN — LIDOCAINE HYDROCHLORIDE 15 ML: 20 SOLUTION ORAL at 04:05

## 2025-05-10 RX ADMIN — ALUMINUM HYDROXIDE, MAGNESIUM HYDROXIDE, AND SIMETHICONE 30 ML: 200; 200; 20 SUSPENSION ORAL at 04:05

## 2025-05-10 RX ADMIN — ONDANSETRON 4 MG: 4 TABLET, ORALLY DISINTEGRATING ORAL at 04:05

## 2025-05-10 NOTE — ED PROVIDER NOTES
ED Provider Note - 5/10/2025    History     Chief Complaint   Patient presents with    Gastroesophageal Reflux    Diarrhea     Nausea, vomitting, reflux, diarrhea. Symptoms since 2200.        GHAZALA Cerna is a 69 y.o. year old male with past medical and surgical history as seen below, presenting with chief complaint of nausea, vomiting, diarrhea.  Symptoms started approximately 6 hours ago.  Difficulty tolerating p.o. prior to arrival.  Denying chest pain or shortness of breath.  Has had some reflux/heartburn type symptoms starting after the multiple episodes of vomiting.  Vomitus was nonbilious and nonbloody.  Denies fever or chills.  No abdominal pain.        Past Medical History:   Diagnosis Date    Chronic seasonal allergic rhinitis 11/16/2017    GERD (gastroesophageal reflux disease)     Hyperlipidemia     TB (pulmonary tuberculosis) 1982    Treated [facility] 9 mo therapy.     Past Surgical History:   Procedure Laterality Date    COLONOSCOPY N/A 8/4/2021    Procedure: COLONOSCOPY   suprep;  Surgeon: Mo Blanco MD;  Location: [facility] ENDO;  Service: Endoscopy;  Laterality: N/A;  covid test; 08/01/2021(sunday) [facility] 2;00pm                          ANB    COLONOSCOPY W/ POLYPECTOMY  08/04/2021    ESOPHAGOGASTRODUODENOSCOPY N/A 8/4/2021    Procedure: EGD (ESOPHAGOGASTRODUODENOSCOPY);  Surgeon: Mo Blanco MD;  Location: [facility];  Service: Endoscopy;  Laterality: N/A;    ESOPHAGOGASTRODUODENOSCOPY (EGD) WITH DILATION  08/04/2021    IRRIGATION AND DEBRIDEMENT OF LOWER EXTREMITY Left 12/30/2019    Procedure: IRRIGATION AND DEBRIDEMENT, LOWER EXTREMITY Bone biopsy heel;  Surgeon: Karo Marsh MD;  Location: [facility] OR;  Service: Podiatry;  Laterality: Left;         Family History   Problem Relation Name Age of Onset    Breast cancer Mother      Cancer Mother      Prostate cancer Father      Cancer Father      No Known Problems Sister      No Known Problems Brother      No Known  "Problems Brother      No Known Problems Brother      Emphysema Neg Hx       Social History[1]  Social Drivers of Health with Concerns     Tobacco Use: Medium Risk (3/7/2025)    Patient History     Smoking Tobacco Use: Former     Smokeless Tobacco Use: Never     Passive Exposure: Not on file   Physical Activity: Insufficiently Active (3/2/2025)    Exercise Vital Sign     Days of Exercise per Week: 3 days     Minutes of Exercise per Session: 30 min   Social Isolation: Not on file      Review of patient's allergies indicates:  No Known Allergies    Review of Systems     A full Review of Systems (ROS) was performed and was negative unless otherwise stated in the HPI.      Physical Exam     Vitals:    05/10/25 0354 05/10/25 0404 05/10/25 0520   BP: 133/70 129/77 114/74   Pulse: 83 83 80   Resp: 16  16   Temp: 97.6 °F (36.4 °C)  98.7 °F (37.1 °C)   TempSrc:   Oral   SpO2: 100% 97% 96%   Weight: 81.6 kg (180 lb)     Height: 5' 7" (1.702 m)          Physical Exam    Nursing note and vitals reviewed.  Constitutional: He appears well-developed and well-nourished. No distress.   HENT:   Head: Normocephalic and atraumatic.   Right Ear: External ear normal.   Left Ear: External ear normal.   Nose: Nose normal.   Eyes: Conjunctivae and EOM are normal. Pupils are equal, round, and reactive to light.   Neck: Neck supple.   Normal range of motion.  Cardiovascular:  Normal rate, regular rhythm and intact distal pulses.           No murmur heard.  Pulmonary/Chest: Breath sounds normal. No respiratory distress.   Abdominal: Abdomen is soft. He exhibits no distension. There is no abdominal tenderness. There is no rebound and no guarding.   Musculoskeletal:         General: No edema. Normal range of motion.      Cervical back: Normal range of motion and neck supple.     Neurological: He is alert and oriented to person, place, and time. He has normal strength. No cranial nerve deficit or sensory deficit.   Skin: Skin is warm and dry. No " rash noted.   Psychiatric: He has a normal mood and affect. Thought content normal.         Lab Results- Independently reviewed by myself      Labs Reviewed - No data to display        Imaging     Imaging Results    None                    ED Course         Procedures         Orders Placed This Encounter    ondansetron disintegrating tablet 4 mg    AND Linked Order Group     aluminum-magnesium hydroxide-simethicone 200-200-20 mg/5 mL suspension 30 mL     LIDOcaine viscous HCl 2% oral solution 15 mL    ondansetron (ZOFRAN-ODT) 8 MG TbDL    loperamide-simethicone 2-125 mg Tab          ED Course as of 05/12/25 0630   Sat May 10, 2025   0513 Patient with a great improvement of symptoms following GI cocktail.  No further nausea.  Tolerating p.o.. [KB]      ED Course User Index  [KB] Erich Duval MD              Medical Decision Making       The patient's list of active medical problems, social history, medications, and allergies as documented per RN staff has been reviewed.           Medical Decision Making  This patient presents with nausea and vomiting. Abdominal exam without peritoneal signs. Considered but low risk for SBO (normal BM, passing flatus, no abdominal surgeries), no signs of DKA in labs.  Symptoms have only been ongoing for a short period so I doubt electrolyte disruption.  Low suspicion for gastric or esophageal dysmotility as cause. Based on history, exam, and work up low suspicion for pancreatitis, appendicitis, biliary pathology, or other emergent problem. Patient given antiemetics and GI cocktail with improved symptoms and tolerated PO in the department.      Patient advised to continue hydrating at home with electrolyte containing solutions. Advance diet slowly with small, bland meals at first prior to returning to your normal diet. Take the nausea medicines prescribed as needed, and to return to the ED for reevaluation should symptoms worsen, return, change in character, or should new symptoms  develop.      Risk  OTC drugs.  Prescription drug management.                    ED Prescriptions       Medication Sig Dispense Start Date End Date Auth. Provider    ondansetron (ZOFRAN-ODT) 8 MG TbDL Take 1 tablet (8 mg total) by mouth 3 (three) times daily as needed (nausea or vomiting). 20 tablet 5/10/2025 -- Erich Duval MD    loperamide-simethicone 2-125 mg Tab Take 1 tablet by mouth 3 (three) times daily as needed (diarrhea). 20 each 5/10/2025 2025 Erich Duval MD              Clinical Impression       Follow-up Information       Follow up With Specialties Details Why Contact Info    Jamaal Cabrera DO Family Medicine Schedule an appointment as soon as possible for a visit in 5 days For follow-up on today's visit.  [facility] [id number]  [phone]      [facility] Emergency Dept Emergency Medicine Go to  As needed, If symptoms worsen [address] [facility] [address] [phone]            Referrals:  No orders of the defined types were placed in this encounter.      Disposition   ED Disposition Condition    Discharge Stable              Final diagnoses:  [K52.9] Gastroenteritis (Primary)        Erich Duval MD        2025          DISCLAIMER: This note was prepared with [employer] voice recognition transcription software. Garbled syntax, mangled pronouns, and other bizarre constructions may be attributed to that software system.         [1]   Social History  Tobacco Use    Smoking status: Former     Current packs/day: 0.00     Average packs/day: 0.1 packs/day for 2.0 years (0.2 ttl pk-yrs)     Types: Cigarettes     Start date: 4/3/1975     Quit date: 4/3/1977     Years since quittin.1    Smokeless tobacco: Never    Tobacco comments:     5 cigs/week. x 2-3 years.   Substance Use Topics    Alcohol use: Yes     Alcohol/week: 3.0 standard drinks of alcohol     Types: 3 Cans of beer per week     Comment: one drink/week    Drug use: No        Erich Duval  MD  05/12/25 0632

## 2025-05-10 NOTE — DISCHARGE INSTRUCTIONS
Lo más probable es que jaylyn síntomas estén relacionados con el reflujo gástrico con o sin úlceras asociadas. Evite las comidas picantes, grasosas y fritas. Bally el medicamento recetado diariamente. No coma tarde en la noche y permanezca erguido belkys 90 minutos después de cada comida. Si siente molestias a pesar de esto, puede josé luis Maalox/Mylanta de venta pao para ayudar con los síntomas.      Continúe hidratándose en casa con soluciones que contengan electrolitos. Avance jones dieta lentamente con comidas pequeñas y blandas al principio antes de volver a jones dieta normal. Bally los medicamentos para las náuseas recetados según sea necesario. Puede regresar al servicio de urgencias para justin reevaluación si jaylyn síntomas empeoran, regresan, cambian de carácter o si se desarrollan nuevos síntomas.

## 2025-06-23 DIAGNOSIS — Z00.00 ENCOUNTER FOR MEDICARE ANNUAL WELLNESS EXAM: ICD-10-CM

## 2025-07-02 ENCOUNTER — LAB VISIT (OUTPATIENT)
Dept: LAB | Facility: HOSPITAL | Age: 70
End: 2025-07-02
Attending: FAMILY MEDICINE
Payer: MEDICARE

## 2025-07-02 ENCOUNTER — OFFICE VISIT (OUTPATIENT)
Dept: FAMILY MEDICINE | Facility: CLINIC | Age: 70
End: 2025-07-02
Payer: MEDICARE

## 2025-07-02 VITALS
BODY MASS INDEX: 27.82 KG/M2 | HEART RATE: 65 BPM | WEIGHT: 177.25 LBS | SYSTOLIC BLOOD PRESSURE: 114 MMHG | DIASTOLIC BLOOD PRESSURE: 72 MMHG | HEIGHT: 67 IN | OXYGEN SATURATION: 98 % | TEMPERATURE: 98 F

## 2025-07-02 DIAGNOSIS — K21.9 GERD WITHOUT ESOPHAGITIS: ICD-10-CM

## 2025-07-02 DIAGNOSIS — Z79.899 ENCOUNTER FOR MONITORING LONG-TERM PROTON PUMP INHIBITOR THERAPY: ICD-10-CM

## 2025-07-02 DIAGNOSIS — Z51.81 ENCOUNTER FOR MONITORING LONG-TERM PROTON PUMP INHIBITOR THERAPY: ICD-10-CM

## 2025-07-02 DIAGNOSIS — N52.9 ERECTILE DYSFUNCTION, UNSPECIFIED ERECTILE DYSFUNCTION TYPE: ICD-10-CM

## 2025-07-02 DIAGNOSIS — Z12.5 SCREENING PSA (PROSTATE SPECIFIC ANTIGEN): ICD-10-CM

## 2025-07-02 DIAGNOSIS — Z91.89 10 YEAR RISK OF MI OR STROKE 7.5% OR GREATER: ICD-10-CM

## 2025-07-02 DIAGNOSIS — E53.8 LOW SERUM VITAMIN B12: ICD-10-CM

## 2025-07-02 DIAGNOSIS — Z91.199 CURRENT NON-ADHERENCE TO MEDICAL TREATMENT: Primary | ICD-10-CM

## 2025-07-02 DIAGNOSIS — E78.49 OTHER HYPERLIPIDEMIA: ICD-10-CM

## 2025-07-02 DIAGNOSIS — R79.89 ABNORMAL CBC: ICD-10-CM

## 2025-07-02 DIAGNOSIS — M54.2 NECK PAIN: ICD-10-CM

## 2025-07-02 DIAGNOSIS — K22.2 LOWER ESOPHAGEAL RING (SCHATZKI): ICD-10-CM

## 2025-07-02 DIAGNOSIS — R10.11 RUQ PAIN: ICD-10-CM

## 2025-07-02 DIAGNOSIS — S16.1XXA STRAIN OF NECK MUSCLE, INITIAL ENCOUNTER: ICD-10-CM

## 2025-07-02 DIAGNOSIS — E55.9 VITAMIN D DEFICIENCY: ICD-10-CM

## 2025-07-02 LAB
25(OH)D3+25(OH)D2 SERPL-MCNC: 26 NG/ML (ref 30–96)
ABSOLUTE EOSINOPHIL (OHS): 0.08 K/UL
ABSOLUTE MONOCYTE (OHS): 0.46 K/UL (ref 0.3–1)
ABSOLUTE NEUTROPHIL COUNT (OHS): 2.48 K/UL (ref 1.8–7.7)
ALBUMIN SERPL BCP-MCNC: 4.5 G/DL (ref 3.5–5.2)
ALP SERPL-CCNC: 94 UNIT/L (ref 40–150)
ALT SERPL W/O P-5'-P-CCNC: 20 UNIT/L (ref 10–44)
ANION GAP (OHS): 10 MMOL/L (ref 8–16)
AST SERPL-CCNC: 26 UNIT/L (ref 11–45)
BASOPHILS # BLD AUTO: 0.04 K/UL
BASOPHILS NFR BLD AUTO: 0.7 %
BILIRUB SERPL-MCNC: 1.1 MG/DL (ref 0.1–1)
BUN SERPL-MCNC: 20 MG/DL (ref 8–23)
CALCIUM SERPL-MCNC: 8.9 MG/DL (ref 8.7–10.5)
CHLORIDE SERPL-SCNC: 105 MMOL/L (ref 95–110)
CHOLEST SERPL-MCNC: 170 MG/DL (ref 120–199)
CHOLEST/HDLC SERPL: 5.2 {RATIO} (ref 2–5)
CO2 SERPL-SCNC: 27 MMOL/L (ref 23–29)
CREAT SERPL-MCNC: 1.2 MG/DL (ref 0.5–1.4)
EAG (OHS): 97 MG/DL (ref 68–131)
ERYTHROCYTE [DISTWIDTH] IN BLOOD BY AUTOMATED COUNT: 13.6 % (ref 11.5–14.5)
FERRITIN SERPL-MCNC: 280 NG/ML (ref 20–300)
GFR SERPLBLD CREATININE-BSD FMLA CKD-EPI: >60 ML/MIN/1.73/M2
GLUCOSE SERPL-MCNC: 88 MG/DL (ref 70–110)
HBA1C MFR BLD: 5 % (ref 4–5.6)
HCT VFR BLD AUTO: 42.5 % (ref 40–54)
HDLC SERPL-MCNC: 33 MG/DL (ref 40–75)
HDLC SERPL: 19.4 % (ref 20–50)
HGB BLD-MCNC: 13.6 GM/DL (ref 14–18)
IMM GRANULOCYTES # BLD AUTO: 0 K/UL (ref 0–0.04)
IMM GRANULOCYTES NFR BLD AUTO: 0 % (ref 0–0.5)
IRON SATN MFR SERPL: 17 % (ref 20–50)
IRON SERPL-MCNC: 61 UG/DL (ref 45–160)
LDLC SERPL CALC-MCNC: 108.6 MG/DL (ref 63–159)
LYMPHOCYTES # BLD AUTO: 2.54 K/UL (ref 1–4.8)
MCH RBC QN AUTO: 30.8 PG (ref 27–31)
MCHC RBC AUTO-ENTMCNC: 32 G/DL (ref 32–36)
MCV RBC AUTO: 96 FL (ref 82–98)
NONHDLC SERPL-MCNC: 137 MG/DL
NUCLEATED RBC (/100WBC) (OHS): 0 /100 WBC
PLATELET # BLD AUTO: 243 K/UL (ref 150–450)
PMV BLD AUTO: 9.8 FL (ref 9.2–12.9)
POTASSIUM SERPL-SCNC: 4.7 MMOL/L (ref 3.5–5.1)
PROT SERPL-MCNC: 7.9 GM/DL (ref 6–8.4)
PSA SERPL-MCNC: 1.58 NG/ML
RBC # BLD AUTO: 4.42 M/UL (ref 4.6–6.2)
RELATIVE EOSINOPHIL (OHS): 1.4 %
RELATIVE LYMPHOCYTE (OHS): 45.4 % (ref 18–48)
RELATIVE MONOCYTE (OHS): 8.2 % (ref 4–15)
RELATIVE NEUTROPHIL (OHS): 44.3 % (ref 38–73)
SODIUM SERPL-SCNC: 142 MMOL/L (ref 136–145)
TIBC SERPL-MCNC: 363 UG/DL (ref 250–450)
TRANSFERRIN SERPL-MCNC: 245 MG/DL (ref 200–375)
TRIGL SERPL-MCNC: 142 MG/DL (ref 30–150)
TSH SERPL-ACNC: 0.78 UIU/ML (ref 0.4–4)
VIT B12 SERPL-MCNC: 699 PG/ML (ref 210–950)
WBC # BLD AUTO: 5.6 K/UL (ref 3.9–12.7)

## 2025-07-02 PROCEDURE — 85025 COMPLETE CBC W/AUTO DIFF WBC: CPT

## 2025-07-02 PROCEDURE — 3078F DIAST BP <80 MM HG: CPT | Mod: CPTII,S$GLB,, | Performed by: FAMILY MEDICINE

## 2025-07-02 PROCEDURE — 3074F SYST BP LT 130 MM HG: CPT | Mod: CPTII,S$GLB,, | Performed by: FAMILY MEDICINE

## 2025-07-02 PROCEDURE — 1159F MED LIST DOCD IN RCRD: CPT | Mod: CPTII,S$GLB,, | Performed by: FAMILY MEDICINE

## 2025-07-02 PROCEDURE — G2211 COMPLEX E/M VISIT ADD ON: HCPCS | Mod: S$GLB,,, | Performed by: FAMILY MEDICINE

## 2025-07-02 PROCEDURE — 3288F FALL RISK ASSESSMENT DOCD: CPT | Mod: CPTII,S$GLB,, | Performed by: FAMILY MEDICINE

## 2025-07-02 PROCEDURE — 36415 COLL VENOUS BLD VENIPUNCTURE: CPT | Mod: PO

## 2025-07-02 PROCEDURE — 1101F PT FALLS ASSESS-DOCD LE1/YR: CPT | Mod: CPTII,S$GLB,, | Performed by: FAMILY MEDICINE

## 2025-07-02 PROCEDURE — 3008F BODY MASS INDEX DOCD: CPT | Mod: CPTII,S$GLB,, | Performed by: FAMILY MEDICINE

## 2025-07-02 PROCEDURE — 84153 ASSAY OF PSA TOTAL: CPT

## 2025-07-02 PROCEDURE — 99999 PR PBB SHADOW E&M-EST. PATIENT-LVL IV: CPT | Mod: PBBFAC,,, | Performed by: FAMILY MEDICINE

## 2025-07-02 PROCEDURE — 99214 OFFICE O/P EST MOD 30 MIN: CPT | Mod: S$GLB,,, | Performed by: FAMILY MEDICINE

## 2025-07-02 PROCEDURE — 82728 ASSAY OF FERRITIN: CPT

## 2025-07-02 PROCEDURE — 83036 HEMOGLOBIN GLYCOSYLATED A1C: CPT

## 2025-07-02 PROCEDURE — 84443 ASSAY THYROID STIM HORMONE: CPT

## 2025-07-02 PROCEDURE — 82607 VITAMIN B-12: CPT

## 2025-07-02 PROCEDURE — 80061 LIPID PANEL: CPT

## 2025-07-02 PROCEDURE — 1126F AMNT PAIN NOTED NONE PRSNT: CPT | Mod: CPTII,S$GLB,, | Performed by: FAMILY MEDICINE

## 2025-07-02 PROCEDURE — 83540 ASSAY OF IRON: CPT

## 2025-07-02 PROCEDURE — 80053 COMPREHEN METABOLIC PANEL: CPT

## 2025-07-02 PROCEDURE — 82306 VITAMIN D 25 HYDROXY: CPT

## 2025-07-02 RX ORDER — LANOLIN ALCOHOL/MO/W.PET/CERES
1000 CREAM (GRAM) TOPICAL DAILY
Qty: 90 TABLET | Refills: 5 | Status: SHIPPED | OUTPATIENT
Start: 2025-07-02

## 2025-07-02 RX ORDER — PANTOPRAZOLE SODIUM 40 MG/1
40 TABLET, DELAYED RELEASE ORAL DAILY
Qty: 90 TABLET | Refills: 3 | Status: SHIPPED | OUTPATIENT
Start: 2025-07-02 | End: 2026-07-02

## 2025-07-02 RX ORDER — ERGOCALCIFEROL 1.25 MG/1
50000 CAPSULE ORAL
Qty: 12 CAPSULE | Refills: 3 | Status: SHIPPED | OUTPATIENT
Start: 2025-07-02

## 2025-07-02 RX ORDER — TIZANIDINE 2 MG/1
2 TABLET ORAL NIGHTLY PRN
Qty: 90 TABLET | Refills: 0 | Status: SHIPPED | OUTPATIENT
Start: 2025-07-02

## 2025-07-02 RX ORDER — EZETIMIBE 10 MG/1
10 TABLET ORAL DAILY
Qty: 90 TABLET | Refills: 3 | Status: SHIPPED | OUTPATIENT
Start: 2025-07-02 | End: 2026-07-02

## 2025-07-02 NOTE — PATIENT INSTRUCTIONS
Shingrix vaccination is the only way to protect yourself against Shingles   Vaccination is over 90% effective at preventing shingles and postherpetic neuralgia in adults 50 years and older with healthy immune systems.  This vaccine is given as 2 doses 2-6 months apart     Tdap at the pharmacy    Restart PPI daily.  Decreased on his own.   If symptoms persist, consider referral to GI for EGD and/or h pylori testing.     Restart zetia, DAILY  Continue D weekly  B12 daily    Zanaflex prn    Labs today  F/u 6 months to monitor adherence. MAWV

## 2025-07-02 NOTE — PROGRESS NOTES
Subjective:       Patient ID: Alessandro Cerna is a 70 y.o. male.    Chief Complaint: Annual Exam      70 year old male    Had PVC/PAC. Was having palpations. That resolved. He doesn't know why. Saw EP. Had holter. No further eval at this time.     GERD/history of h pylori/Schatzi Ring: had EGD, 2021. Saw GI. Swallowing has improved. On PPI. But only taking as needed. Decreased on his own. Now having nausea and acid reflux. Not having trouble swallowing. Also having RUQ pain.   DLD: not on statin. On zetia now because he wanted to change. Only taking sometimes. On his own as well.   b12 def: likely due to PPI. Noted 7/2022. Recheck today  Vitamin D: check today. Recheck today    Colonoscopy due next year    Tdap and shingrix at pharmacy      Review of Systems   Constitutional:  Negative for chills and fever.   HENT:  Negative for trouble swallowing.    Respiratory:  Negative for shortness of breath.    Cardiovascular:  Negative for chest pain.   Gastrointestinal:  Positive for abdominal pain and nausea. Negative for vomiting.   Genitourinary:  Negative for difficulty urinating and dysuria.   Neurological:  Negative for dizziness, light-headedness and headaches.         Health Maintenance Due   Topic Date Due    Shingles Vaccine (1 of 2) Never done    TETANUS VACCINE  10/25/2015    RSV Vaccine (Age 60+ and Pregnant patients) (1 - Risk 60-74 years 1-dose series) Never done    COVID-19 Vaccine (3 - 2024-25 season) 09/01/2024     Immunization History   Administered Date(s) Administered    COVID-19, MRNA, LN-S, PF (MODERNA FULL 0.5 ML DOSE) 03/16/2021, 04/13/2021    Influenza (FLUAD) - Quadrivalent - Adjuvanted - PF *Preferred* (65+) 11/03/2022    Influenza - Quadrivalent - PF *Preferred* (6 months and older) 12/13/2021    Pneumococcal Conjugate - 20 Valent 07/19/2024    Pneumococcal Polysaccharide - 23 Valent 06/11/2014    Td (ADULT) 10/25/2005           Objective:     Vitals:    07/02/25 1449   BP: 114/72   BP  "Location: Right arm   Patient Position: Sitting   Pulse: 65   Temp: 98.3 °F (36.8 °C)   TempSrc: Temporal   SpO2: 98%   Weight: 80.4 kg (177 lb 4 oz)   Height: 5' 7" (1.702 m)        Physical Exam  Vitals and nursing note reviewed.   Constitutional:       General: He is not in acute distress.     Appearance: He is not ill-appearing, toxic-appearing or diaphoretic.   Cardiovascular:      Rate and Rhythm: Normal rate and regular rhythm.   Pulmonary:      Effort: Pulmonary effort is normal.      Breath sounds: Normal breath sounds.   Abdominal:      Palpations: Abdomen is soft.      Tenderness: There is no abdominal tenderness.   Musculoskeletal:         General: No swelling.   Neurological:      General: No focal deficit present.      Mental Status: He is alert.   Psychiatric:         Mood and Affect: Mood normal.         Behavior: Behavior normal.         Thought Content: Thought content normal.         Judgment: Judgment normal.         Assessment:       1. Current non-adherence to medical treatment    2. GERD without esophagitis    3. Lower esophageal ring (Schatzki)    4. Other hyperlipidemia    5. 10 year risk of MI or stroke 7.5% or greater    6. Vitamin D deficiency    7. Low serum vitamin B12    8. Neck pain    9. Strain of neck muscle, initial encounter    10. Encounter for monitoring long-term proton pump inhibitor therapy    11. Abnormal CBC    12. Screening PSA (prostate specific antigen)    13. Erectile dysfunction, unspecified erectile dysfunction type    14. RUQ pain    15. BMI 27.0-27.9,adult        Plan:       Restart PPI daily.  Decreased on his own.   If symptoms persist, consider referral to GI for EGD and/or h pylori testing.     Restart zetia, DAILY  Continue D weekly  B12 daily    Zanaflex prn    Labs today  F/u 6 months to monitor adherence. MAWV    Patient is under my ongoing care. I will continue to be the focal point for all health care services the patient needs.         Current " non-adherence to medical treatment    GERD without esophagitis  -     pantoprazole (PROTONIX) 40 MG tablet; Take 1 tablet (40 mg total) by mouth once daily.  Dispense: 90 tablet; Refill: 3  -     CBC Auto Differential; Future; Expected date: 07/02/2025  -     Comprehensive Metabolic Panel; Future; Expected date: 07/02/2025  -     US Abdomen Limited_Gallbladder; Future; Expected date: 07/02/2025    Lower esophageal ring (Schatzki)  -     pantoprazole (PROTONIX) 40 MG tablet; Take 1 tablet (40 mg total) by mouth once daily.  Dispense: 90 tablet; Refill: 3    Other hyperlipidemia  -     ezetimibe (ZETIA) 10 mg tablet; Take 1 tablet (10 mg total) by mouth once daily.  Dispense: 90 tablet; Refill: 3  -     TSH; Future; Expected date: 07/02/2025  -     Lipid Panel; Future; Expected date: 07/02/2025    10 year risk of MI or stroke 7.5% or greater  -     ezetimibe (ZETIA) 10 mg tablet; Take 1 tablet (10 mg total) by mouth once daily.  Dispense: 90 tablet; Refill: 3    Vitamin D deficiency  -     ergocalciferol (ERGOCALCIFEROL) 50,000 unit Cap; Take 1 capsule (50,000 Units total) by mouth every 7 days.  Dispense: 12 capsule; Refill: 3    Low serum vitamin B12  -     cyanocobalamin (VITAMIN B-12) 1000 MCG tablet; Take 1 tablet (1,000 mcg total) by mouth once daily.  Dispense: 90 tablet; Refill: 5    Neck pain  -     tiZANidine (ZANAFLEX) 2 MG tablet; Take 1 tablet (2 mg total) by mouth nightly as needed.  Dispense: 90 tablet; Refill: 0    Strain of neck muscle, initial encounter  -     tiZANidine (ZANAFLEX) 2 MG tablet; Take 1 tablet (2 mg total) by mouth nightly as needed.  Dispense: 90 tablet; Refill: 0    Encounter for monitoring long-term proton pump inhibitor therapy  -     Vitamin D; Future; Expected date: 07/02/2025  -     Vitamin B12; Future; Expected date: 07/02/2025    Abnormal CBC  -     Hemoglobin A1C; Future; Expected date: 07/02/2025  -     Iron and TIBC; Future; Expected date: 07/02/2025  -     FERRITIN;  Future; Expected date: 07/02/2025    Screening PSA (prostate specific antigen)  -     PSA, Screening; Future; Expected date: 07/02/2025    Erectile dysfunction, unspecified erectile dysfunction type  -     PSA, Screening; Future; Expected date: 07/02/2025    RUQ pain  -     US Abdomen Limited_Gallbladder; Future; Expected date: 07/02/2025    BMI 27.0-27.9,adult

## 2025-07-03 ENCOUNTER — RESULTS FOLLOW-UP (OUTPATIENT)
Dept: FAMILY MEDICINE | Facility: CLINIC | Age: 70
End: 2025-07-03

## 2025-07-03 ENCOUNTER — HOSPITAL ENCOUNTER (OUTPATIENT)
Dept: RADIOLOGY | Facility: HOSPITAL | Age: 70
Discharge: HOME | End: 2025-07-03
Attending: FAMILY MEDICINE
Payer: MEDICARE

## 2025-07-03 DIAGNOSIS — K21.9 GERD WITHOUT ESOPHAGITIS: ICD-10-CM

## 2025-07-03 DIAGNOSIS — R79.0 ABNORMAL IRON SATURATION: Primary | ICD-10-CM

## 2025-07-03 DIAGNOSIS — E78.49 OTHER HYPERLIPIDEMIA: ICD-10-CM

## 2025-07-03 DIAGNOSIS — R10.11 RUQ PAIN: ICD-10-CM

## 2025-07-03 PROCEDURE — 76705 ECHO EXAM OF ABDOMEN: CPT | Mod: 26,,, | Performed by: RADIOLOGY

## 2025-07-03 PROCEDURE — 76705 ECHO EXAM OF ABDOMEN: CPT | Mod: TC

## 2025-07-08 ENCOUNTER — TELEPHONE (OUTPATIENT)
Dept: FAMILY MEDICINE | Facility: CLINIC | Age: 70
End: 2025-07-08
Payer: MEDICARE

## 2025-07-08 NOTE — TELEPHONE ENCOUNTER
Called patient to go over recent lab results per Dr Cabrera. Spoke to patient. Patient verbalized understanding. Labs scheduled before appointment in 6 months.

## 2025-07-08 NOTE — TELEPHONE ENCOUNTER
----- Message from Jamaal Cabrera DO sent at 7/3/2025  8:31 AM CDT -----  Labs prior to apt in 6 months please.     ----- Message -----  From: Lab, Background User  Sent: 7/2/2025  11:15 PM CDT  To: Jamaal Cabrera DO

## (undated) DEVICE — SEE MEDLINE ITEM 154981

## (undated) DEVICE — STOCKINET 4INX48

## (undated) DEVICE — GLOVE 8 PROTEXIS PI BLUE

## (undated) DEVICE — BANDAGE DERMACEA STRETCH 4X1IN

## (undated) DEVICE — SEE MEDLINE ITEM 156953

## (undated) DEVICE — PAD CAST SPECIALIST STRL 3

## (undated) DEVICE — APPLICATOR CHLORAPREP ORN 26ML

## (undated) DEVICE — NDL HYPO REG 25G X 1 1/2

## (undated) DEVICE — DRESSING XEROFORM 1X8IN

## (undated) DEVICE — SEE MEDLINE ITEM 146345

## (undated) DEVICE — SPONGE GAUZE 16PLY 4X4

## (undated) DEVICE — PAD PREP 50/CA

## (undated) DEVICE — PAD CAST SPECIALIST STRL 6

## (undated) DEVICE — SYR 10CC LUER LOCK

## (undated) DEVICE — NDL 18GA X1 1/2 REG BEVEL

## (undated) DEVICE — DRESSING SPONGE 8PLY 4X4 STRL

## (undated) DEVICE — SEE MEDLINE ITEM 152523

## (undated) DEVICE — SEE L#120831

## (undated) DEVICE — GLOVE BIOGEL ECLIPSE SZ 8

## (undated) DEVICE — SEE MEDLINE ITEM 146270

## (undated) DEVICE — STRIP PACKING ANTIMIC 1/4X1

## (undated) DEVICE — COVER OVERHEAD SURG LT BLUE

## (undated) DEVICE — SWAB CULTURETTE SINGLE

## (undated) DEVICE — KIT COLLECTION E SWAB REGULAR

## (undated) DEVICE — SUT MCRYL PLUS 4-0 PS2 27IN

## (undated) DEVICE — BLADE SURG #15 CARBON STEEL

## (undated) DEVICE — BLADE SCALP OPHTL BEVEL STR

## (undated) DEVICE — GAUZE SPONGE 4X4 12PLY

## (undated) DEVICE — ELECTRODE REM PLYHSV RETURN 9